# Patient Record
Sex: FEMALE | Race: BLACK OR AFRICAN AMERICAN | Employment: FULL TIME | URBAN - NONMETROPOLITAN AREA
[De-identification: names, ages, dates, MRNs, and addresses within clinical notes are randomized per-mention and may not be internally consistent; named-entity substitution may affect disease eponyms.]

---

## 2017-04-21 DIAGNOSIS — N39.41 URGE INCONTINENCE OF URINE: ICD-10-CM

## 2017-04-21 RX ORDER — OXYBUTYNIN CHLORIDE 5 MG/1
5 TABLET ORAL 2 TIMES DAILY
Qty: 60 TABLET | Refills: 5 | Status: SHIPPED | OUTPATIENT
Start: 2017-04-21 | End: 2018-11-03

## 2017-04-24 RX ORDER — UREA 40 G/100G
1 LOTION TOPICAL DAILY
Qty: 1 BOTTLE | Refills: 1 | Status: SHIPPED | OUTPATIENT
Start: 2017-04-24 | End: 2018-11-03

## 2017-06-19 ENCOUNTER — OFFICE VISIT (OUTPATIENT)
Dept: PRIMARY CARE CLINIC | Age: 57
End: 2017-06-19
Payer: MEDICAID

## 2017-06-19 VITALS
BODY MASS INDEX: 40.04 KG/M2 | OXYGEN SATURATION: 98 % | RESPIRATION RATE: 16 BRPM | HEIGHT: 59 IN | DIASTOLIC BLOOD PRESSURE: 80 MMHG | WEIGHT: 198.6 LBS | HEART RATE: 50 BPM | TEMPERATURE: 96.5 F | SYSTOLIC BLOOD PRESSURE: 116 MMHG

## 2017-06-19 DIAGNOSIS — K52.9 GASTROENTERITIS: Primary | ICD-10-CM

## 2017-06-19 PROCEDURE — 99213 OFFICE O/P EST LOW 20 MIN: CPT | Performed by: FAMILY MEDICINE

## 2017-06-19 ASSESSMENT — ENCOUNTER SYMPTOMS
NAUSEA: 1
EYES NEGATIVE: 1
ABDOMINAL DISTENTION: 0
BLOOD IN STOOL: 0
RESPIRATORY NEGATIVE: 1
VOMITING: 1
DIARRHEA: 0
ABDOMINAL PAIN: 0
ALLERGIC/IMMUNOLOGIC NEGATIVE: 1
CONSTIPATION: 0

## 2017-10-19 DIAGNOSIS — B35.1 ONYCHOMYCOSIS: ICD-10-CM

## 2018-11-03 ENCOUNTER — HOSPITAL ENCOUNTER (EMERGENCY)
Age: 58
Discharge: HOME | End: 2018-11-03

## 2018-11-03 ENCOUNTER — APPOINTMENT (OUTPATIENT)
Dept: CT IMAGING | Age: 58
End: 2018-11-03

## 2018-11-03 VITALS
OXYGEN SATURATION: 99 % | TEMPERATURE: 98.8 F | WEIGHT: 214 LBS | HEIGHT: 59 IN | HEART RATE: 75 BPM | SYSTOLIC BLOOD PRESSURE: 130 MMHG | DIASTOLIC BLOOD PRESSURE: 94 MMHG | RESPIRATION RATE: 18 BRPM | BODY MASS INDEX: 43.14 KG/M2

## 2018-11-03 DIAGNOSIS — B35.1 ONYCHOMYCOSIS: ICD-10-CM

## 2018-11-03 DIAGNOSIS — I10 ESSENTIAL HYPERTENSION: Primary | ICD-10-CM

## 2018-11-03 DIAGNOSIS — N39.41 URGE INCONTINENCE OF URINE: ICD-10-CM

## 2018-11-03 LAB
ALBUMIN SERPL-MCNC: 3.7 G/DL (ref 3.5–5.1)
ALP BLD-CCNC: 108 U/L (ref 38–126)
ALT SERPL-CCNC: 10 U/L (ref 11–66)
ANION GAP SERPL CALCULATED.3IONS-SCNC: 12 MEQ/L (ref 8–16)
AST SERPL-CCNC: 16 U/L (ref 5–40)
BASOPHILS # BLD: 0.3 %
BASOPHILS ABSOLUTE: 0 THOU/MM3 (ref 0–0.1)
BILIRUB SERPL-MCNC: 0.3 MG/DL (ref 0.3–1.2)
BILIRUBIN DIRECT: < 0.2 MG/DL (ref 0–0.3)
BUN BLDV-MCNC: 12 MG/DL (ref 7–22)
CALCIUM SERPL-MCNC: 9.4 MG/DL (ref 8.5–10.5)
CHLORIDE BLD-SCNC: 103 MEQ/L (ref 98–111)
CO2: 22 MEQ/L (ref 23–33)
CREAT SERPL-MCNC: 0.6 MG/DL (ref 0.4–1.2)
EKG ATRIAL RATE: 70 BPM
EKG P AXIS: 41 DEGREES
EKG P-R INTERVAL: 156 MS
EKG Q-T INTERVAL: 388 MS
EKG QRS DURATION: 84 MS
EKG QTC CALCULATION (BAZETT): 419 MS
EKG R AXIS: -35 DEGREES
EKG T AXIS: -12 DEGREES
EKG VENTRICULAR RATE: 70 BPM
EOSINOPHIL # BLD: 0.7 %
EOSINOPHILS ABSOLUTE: 0.1 THOU/MM3 (ref 0–0.4)
ERYTHROCYTE [DISTWIDTH] IN BLOOD BY AUTOMATED COUNT: 15.5 % (ref 11.5–14.5)
ERYTHROCYTE [DISTWIDTH] IN BLOOD BY AUTOMATED COUNT: 45.3 FL (ref 35–45)
GFR SERPL CREATININE-BSD FRML MDRD: > 90 ML/MIN/1.73M2
GLUCOSE BLD-MCNC: 89 MG/DL (ref 70–108)
HCT VFR BLD CALC: 43.9 % (ref 37–47)
HEMOGLOBIN: 14.1 GM/DL (ref 12–16)
IMMATURE GRANS (ABS): 0.02 THOU/MM3 (ref 0–0.07)
IMMATURE GRANULOCYTES: 0.3 %
LYMPHOCYTES # BLD: 27.8 %
LYMPHOCYTES ABSOLUTE: 2 THOU/MM3 (ref 1–4.8)
MCH RBC QN AUTO: 26 PG (ref 26–33)
MCHC RBC AUTO-ENTMCNC: 32.1 GM/DL (ref 32.2–35.5)
MCV RBC AUTO: 80.8 FL (ref 81–99)
MONOCYTES # BLD: 6.8 %
MONOCYTES ABSOLUTE: 0.5 THOU/MM3 (ref 0.4–1.3)
NUCLEATED RED BLOOD CELLS: 0 /100 WBC
OSMOLALITY CALCULATION: 273.1 MOSMOL/KG (ref 275–300)
PLATELET # BLD: 290 THOU/MM3 (ref 130–400)
PMV BLD AUTO: 9.3 FL (ref 9.4–12.4)
POTASSIUM SERPL-SCNC: 4.2 MEQ/L (ref 3.5–5.2)
RBC # BLD: 5.43 MILL/MM3 (ref 4.2–5.4)
SEG NEUTROPHILS: 64.1 %
SEGMENTED NEUTROPHILS ABSOLUTE COUNT: 4.6 THOU/MM3 (ref 1.8–7.7)
SODIUM BLD-SCNC: 137 MEQ/L (ref 135–145)
TOTAL PROTEIN: 7.9 G/DL (ref 6.1–8)
TROPONIN T: < 0.01 NG/ML
WBC # BLD: 7.2 THOU/MM3 (ref 4.8–10.8)

## 2018-11-03 PROCEDURE — 93005 ELECTROCARDIOGRAM TRACING: CPT | Performed by: PHYSICIAN ASSISTANT

## 2018-11-03 PROCEDURE — 85025 COMPLETE CBC W/AUTO DIFF WBC: CPT

## 2018-11-03 PROCEDURE — 80053 COMPREHEN METABOLIC PANEL: CPT

## 2018-11-03 PROCEDURE — 99284 EMERGENCY DEPT VISIT MOD MDM: CPT

## 2018-11-03 PROCEDURE — 82248 BILIRUBIN DIRECT: CPT

## 2018-11-03 PROCEDURE — 84484 ASSAY OF TROPONIN QUANT: CPT

## 2018-11-03 PROCEDURE — 36415 COLL VENOUS BLD VENIPUNCTURE: CPT

## 2018-11-03 PROCEDURE — 70450 CT HEAD/BRAIN W/O DYE: CPT

## 2018-11-03 RX ORDER — UREA 40 G/100G
1 LOTION TOPICAL DAILY
Qty: 1 BOTTLE | Refills: 1 | Status: SHIPPED | OUTPATIENT
Start: 2018-11-03 | End: 2019-11-07 | Stop reason: ALTCHOICE

## 2018-11-03 RX ORDER — OXYBUTYNIN CHLORIDE 5 MG/1
5 TABLET ORAL 2 TIMES DAILY
Qty: 60 TABLET | Refills: 1 | Status: SHIPPED | OUTPATIENT
Start: 2018-11-03 | End: 2019-10-14 | Stop reason: ALTCHOICE

## 2018-11-03 ASSESSMENT — ENCOUNTER SYMPTOMS
SHORTNESS OF BREATH: 0
DIARRHEA: 0
COLOR CHANGE: 0
EYE DISCHARGE: 0
WHEEZING: 0
COUGH: 0
ABDOMINAL PAIN: 0
EYE REDNESS: 0
SORE THROAT: 0
CONSTIPATION: 0
NAUSEA: 0
PHOTOPHOBIA: 0
RHINORRHEA: 0
BACK PAIN: 0
VOMITING: 0

## 2018-11-03 NOTE — ED PROVIDER NOTES
saturation is 99%. Physical Exam   Constitutional: She is oriented to person, place, and time. She appears well-developed and well-nourished. No distress. HENT:   Head: Normocephalic and atraumatic. Right Ear: External ear normal.   Left Ear: External ear normal.   Nose: Nose normal.   Mouth/Throat: Oropharynx is clear and moist.   Eyes: Pupils are equal, round, and reactive to light. Conjunctivae and EOM are normal. Right eye exhibits no discharge. Left eye exhibits no discharge. No scleral icterus. Neck: Normal range of motion. Neck supple. Cardiovascular: Normal rate, regular rhythm and normal heart sounds. Exam reveals no gallop and no friction rub. No murmur heard. Pulmonary/Chest: Effort normal and breath sounds normal. No respiratory distress. She has no wheezes. She has no rales. She exhibits no tenderness. Abdominal: Soft. Bowel sounds are normal. She exhibits no distension and no mass. There is no tenderness. There is no rebound and no guarding. No hernia. Musculoskeletal: Normal range of motion. She exhibits no edema. Lymphadenopathy:     She has no cervical adenopathy. Neurological: She is alert and oriented to person, place, and time. No cranial nerve deficit. She exhibits normal muscle tone. Coordination normal. GCS eye subscore is 4. GCS verbal subscore is 5. GCS motor subscore is 6. There were no noted cranial nerve or focal neurologic deficits. Cranial nerves II through XII are grossly intact. Skin: Skin is warm and dry. No rash noted. She is not diaphoretic. No erythema. No pallor. Psychiatric: She has a normal mood and affect. Her behavior is normal. Thought content normal.   Nursing note and vitals reviewed.             DIFFERENTIAL DIAGNOSIS:   Includes but not limited to: Tension headache, migraine headache, CVA, hypertensive urgency/emergency, essential hypertension, medication noncompliance    DIAGNOSTIC RESULTS     EKG: All EKG's are interpreted by the Emergency

## 2018-11-04 PROCEDURE — 93010 ELECTROCARDIOGRAM REPORT: CPT | Performed by: INTERNAL MEDICINE

## 2019-05-22 ENCOUNTER — APPOINTMENT (OUTPATIENT)
Dept: GENERAL RADIOLOGY | Age: 59
End: 2019-05-22
Payer: MEDICARE

## 2019-05-22 ENCOUNTER — HOSPITAL ENCOUNTER (EMERGENCY)
Age: 59
Discharge: HOME | End: 2019-05-22
Payer: MEDICARE

## 2019-05-22 VITALS
SYSTOLIC BLOOD PRESSURE: 144 MMHG | DIASTOLIC BLOOD PRESSURE: 88 MMHG | RESPIRATION RATE: 20 BRPM | BODY MASS INDEX: 43.22 KG/M2 | TEMPERATURE: 98.6 F | HEIGHT: 59 IN | HEART RATE: 90 BPM | OXYGEN SATURATION: 95 %

## 2019-05-22 DIAGNOSIS — J18.9 PNEUMONIA DUE TO ORGANISM: Primary | ICD-10-CM

## 2019-05-22 DIAGNOSIS — J06.9 UPPER RESPIRATORY TRACT INFECTION, UNSPECIFIED TYPE: ICD-10-CM

## 2019-05-22 LAB
GROUP A STREP CULTURE, REFLEX: NEGATIVE
REFLEX THROAT C + S: NORMAL

## 2019-05-22 PROCEDURE — 71046 X-RAY EXAM CHEST 2 VIEWS: CPT

## 2019-05-22 PROCEDURE — 87880 STREP A ASSAY W/OPTIC: CPT

## 2019-05-22 PROCEDURE — 87070 CULTURE OTHR SPECIMN AEROBIC: CPT

## 2019-05-22 PROCEDURE — 99283 EMERGENCY DEPT VISIT LOW MDM: CPT

## 2019-05-22 RX ORDER — BENZONATATE 100 MG/1
100 CAPSULE ORAL 3 TIMES DAILY PRN
Qty: 30 CAPSULE | Refills: 0 | Status: SHIPPED | OUTPATIENT
Start: 2019-05-22 | End: 2019-05-29

## 2019-05-22 RX ORDER — AMOXICILLIN AND CLAVULANATE POTASSIUM 875; 125 MG/1; MG/1
1 TABLET, FILM COATED ORAL 2 TIMES DAILY
Qty: 20 TABLET | Refills: 0 | Status: SHIPPED | OUTPATIENT
Start: 2019-05-22 | End: 2019-06-01

## 2019-05-22 RX ORDER — PREDNISONE 20 MG/1
20 TABLET ORAL 2 TIMES DAILY
Qty: 10 TABLET | Refills: 0 | Status: SHIPPED | OUTPATIENT
Start: 2019-05-22 | End: 2019-05-27

## 2019-05-22 RX ORDER — CETIRIZINE HYDROCHLORIDE 10 MG/1
10 TABLET ORAL DAILY
Qty: 30 TABLET | Refills: 0 | Status: SHIPPED | OUTPATIENT
Start: 2019-05-22 | End: 2019-06-21

## 2019-05-22 ASSESSMENT — ENCOUNTER SYMPTOMS
COUGH: 1
SINUS PRESSURE: 0
VOMITING: 0
CONSTIPATION: 0
RHINORRHEA: 1
SHORTNESS OF BREATH: 0
NAUSEA: 0
FACIAL SWELLING: 0
ABDOMINAL PAIN: 0
WHEEZING: 0
TROUBLE SWALLOWING: 0
CHEST TIGHTNESS: 0
SORE THROAT: 1
DIARRHEA: 1

## 2019-05-22 ASSESSMENT — PAIN DESCRIPTION - PAIN TYPE: TYPE: ACUTE PAIN

## 2019-05-22 ASSESSMENT — PAIN SCALES - GENERAL: PAINLEVEL_OUTOF10: 2

## 2019-05-22 ASSESSMENT — PAIN DESCRIPTION - LOCATION: LOCATION: THROAT

## 2019-05-22 ASSESSMENT — PAIN DESCRIPTION - FREQUENCY: FREQUENCY: CONTINUOUS

## 2019-05-22 ASSESSMENT — PAIN DESCRIPTION - DESCRIPTORS: DESCRIPTORS: BURNING

## 2019-05-22 NOTE — ED PROVIDER NOTES
[address]  Pt Name: Milind Zuleta  MRN: [id number]  [place] 1960  Date of evaluation: 5/22/2019  Provider: JOANNA Cook CNP    CHIEF COMPLAINT       Chief Complaint   Patient presents with    Cough    Pharyngitis       Nurses Notes reviewed and I agree except as noted in the HPI. HISTORY OF PRESENT ILLNESS    Milind Zuleta is a 62 y.o. female whopresents to the emergency department from home for evaluation of cough and pharyngitis. Patient states that she has had cough, pharyngitis, rhinorrhea, congestion, subjective fevers, and diarrhea ongoing which started today. Patient states that she has not taken anything today for her symptoms. Patient denies chills, nausea, emesis, constipation, headache, abdominal pain, or any other symptoms. All questions addressed and no further complaints or concerns at this time. Triage notes and Nursing notes were reviewed by myself. Any discrepancies are addressed above. REVIEW OF SYSTEMS     Review of Systems   Constitutional: Positive for fever. Negative for appetite change and chills. HENT: Positive for congestion, rhinorrhea and sore throat. Negative for ear pain, facial swelling, nosebleeds, postnasal drip, sinus pressure and trouble swallowing. Respiratory: Positive for cough. Negative for chest tightness, shortness of breath and wheezing. Cardiovascular: Negative for chest pain. Gastrointestinal: Positive for diarrhea. Negative for abdominal pain, constipation, nausea and vomiting. Musculoskeletal: Negative for myalgias. Skin: Negative for rash. Neurological: Negative for headaches. All pertinent systems were reviewed and are negative unless indicated in the HPI.     PAST MEDICAL HISTORY    has a past medical history of Anemia, Hypertension, Infertility female, Obesity, Pap smear, as part of routine gynecological examination, and Visit for screening mammogram.    SURGICAL HISTORY      has a past surgical history that includes Ectopic pregnancy surgery. CURRENT MEDICATIONS       Discharge Medication List as of 2019  7:37 PM      CONTINUE these medications which have NOT CHANGED    Details   ciclopirox (PENLAC) 8 % solution Apply to affected toenails topically nightly., Disp-1 Bottle, R-1, Print      naproxen-diphenhydramine (ALEVE PM) 220-25 MG TABS Take 1 tablet by mouth every 6 hours as needed (pain), Disp-30 tablet, R-1Print      oxybutynin (DITROPAN) 5 MG tablet Take 1 tablet by mouth 2 times daily, Disp-60 tablet, R-1Print      Urea 40 % LOTN Apply 1 Units topically daily, Disp-1 Bottle, R-1Print      Elastic Bandages & Supports (MEDICAL COMPRESSION STOCKINGS) MISC DAILY Starting 2016, Until Discontinued, Disp-1 each, R-0, [id number] mmHg knee high Chronic venous insufficiency  (primary encounter diagnosis)               ALLERGIES     has No Known Allergies. FAMILY HISTORY     indicated that her mother is . She indicated that her father is . She indicated that her brother is . She indicated that the status of her neg hx is unknown.   family history includes Cancer in her brother and father; Heart Disease in her mother. SOCIAL HISTORY      reports that she quit smoking about 8 years ago. Her smoking use included cigarettes. She smoked 0.50 packs per day. She has never used smokeless tobacco. She reports that she drinks alcohol. She reports that she does not use drugs. PHYSICAL EXAM     INITIAL VITALS:  height is 4' 11\" (1.499 m). Her oral temperature is 98.6 °F (37 °C). Her blood pressure is 144/88 (abnormal) and her pulse is 90. Her respiration is 20 and oxygen saturation is 95%. Physical Exam   Constitutional: She is oriented to person, place, and time. She appears well-developed and well-nourished. Non-toxic appearance. HENT:   Head: Normocephalic and atraumatic.    Right Ear: Tympanic membrane and external ear normal.   Left Ear: Tympanic membrane signed by Dr. Kellie Huntley on 5/22/2019 7:21 PM            LABS:   Labs Reviewed   THROAT CULTURE    Narrative:     Source: Specimen not received       Site:           Current Antibiotics:   GROUP A STREP, REFLEX         EMERGENCYDEPARTMENT COURSE AND MEDICAL DECISION MAKING:   Vitals:    Vitals:    05/22/19 1831   BP: (!) 144/88   Pulse: 90   Resp: 20   Temp: 98.6 °F (37 °C)   TempSrc: Oral   SpO2: 95%   Height: 4' 11\" (1.499 m)         Pertinent Labs & Imaging studies reviewed. (See chart for details)         The patient was seen and evaluated in a timely manner for cough and pharyngitis. Patient's vital signs were stable. During the physical exam I noted no acute findings. I ordered appropriate labs and chest x-ray. Patient's chest x-ray showed Minimal interstitial markings in the right lung base for which minimal infiltrate cannot be entirely excluded. Laboratory and imaging results were reviewed and discussed with the patient. Patient was discharged home with prescriptions of Augmentin, Deltasone, Tessalon Perles, and Zyrtec. Strict return precautions and follow up instructions were discussed with the patient with which the patient agrees     Physical assessment findings, diagnostic testing if applicable, and vital signs reviewed with patient. Questions answered. Patient is instructed to take Augmentin, Deltasone, Tessalon Perles, and Zyrtec as directed, including OTC medications for supportive care. Education provided on medications. Differential diagnosis discussed with patient. Home care/self care instructions reviewed with patient. Patient is to follow-up with family care provider in 2-3 days if no improvement. Patient is to go to the emergency department if symptoms worsen. Patient is aware of care plan, questions answered, verbalizes understanding and is in agreement.      ED Medications administered this visit: Medications - No data to display        I have given the patient strict written and verbal instructions about care at home,follow-up, and signs and symptoms of worsening of condition and they did verbalize understanding. Patient was seen independently by myself. The Patient's final impression and disposition and plan was determined by myself. CRITICAL CARE:   None    CONSULTS:  None    PROCEDURES:  None    FINAL IMPRESSION      1. Pneumonia due to organism    2. Upper respiratory tract infection, unspecified type          DISPOSITION/PLAN   DISPOSITION Decision To Discharge 05/22/2019 07:26:59 PM        PATIENT REFERRED TO:  Rayo Greco,   [address] [facility]  [phone]    Schedule an appointment as soon as possible for a visit in 2 days  For follow up      DISCHARGE MEDICATIONS:  Discharge Medication List as of 5/22/2019  7:37 PM      START taking these medications    Details   amoxicillin-clavulanate (AUGMENTIN) 875-125 MG per tablet Take 1 tablet by mouth 2 times daily for 10 days, Disp-20 tablet, R-0Print      predniSONE (DELTASONE) 20 MG tablet Take 1 tablet by mouth 2 times daily for 5 days, Disp-10 tablet, R-0Print      benzonatate (TESSALON PERLES) 100 MG capsule Take 1 capsule by mouth 3 times daily as needed for Cough, Disp-30 capsule, R-0Print      cetirizine (ZYRTEC) 10 MG tablet Take 1 tablet by mouth daily, Disp-30 tablet, R-0Print             (Please note that portions of this note were completed with a voice recognition program.  Efforts were made to edit the dictations but occasionally words are mis-transcribed.)    The patient was given an opportunity to see the Emergency Attending. The patient voiced understanding that I was a Mid-Level Provider and was in agreement with being seen independently by myself. Scribe:  Noreen Wheatley 5/22/19 7:21 PM Scribing for and in the presence of Fanny Grover CNP.     Provider:  I personally performed the services described in the documentation, reviewed and edited the documentation which was dictated to the scribe in my presence, and it accurately records my words and actions.     Cici Rios, NICANOR 5/22/19 5:54 AM      Burch Clarity, APRN - CNP             Burch Clarity, APRN - CNP  05/23/19 [id number]

## 2019-05-24 LAB — THROAT/NOSE CULTURE: NORMAL

## 2019-05-24 NOTE — TELEPHONE ENCOUNTER
05/24/19      [address] [facility]      Dear Maxine Acevedo                  Your colonoscopy on 5/16/19 indicated:    [x]   No polyps noted in viewed portion of colon. Unable to reach and view the cecum. Poor prep. Extremely redundant sigmoid colon. CT colonography recommended and ordered. Would not recommend another colonoscopy unless pathology identified on imaging. [x]   Diverticulosis: Outpouchings of the colon where food and bacteria can become trapped causing inflammation and infection. An over the counter fiber supplement is recommended. If you have additional questions or concerns, please call the office to review.     Sincerely,      Dr. Marc Bowers Surgeon This med has not been prescribed since 3/2016 - has she been using intermittently since then, or does she want to try another 12-week course?

## 2019-08-24 ENCOUNTER — APPOINTMENT (OUTPATIENT)
Dept: GENERAL RADIOLOGY | Age: 59
End: 2019-08-24
Payer: MEDICARE

## 2019-08-24 ENCOUNTER — HOSPITAL ENCOUNTER (EMERGENCY)
Age: 59
Discharge: HOME | End: 2019-08-24
Payer: MEDICARE

## 2019-08-24 VITALS
HEART RATE: 73 BPM | OXYGEN SATURATION: 100 % | HEIGHT: 59 IN | SYSTOLIC BLOOD PRESSURE: 143 MMHG | RESPIRATION RATE: 18 BRPM | BODY MASS INDEX: 42.94 KG/M2 | TEMPERATURE: 99.2 F | DIASTOLIC BLOOD PRESSURE: 85 MMHG | WEIGHT: 213 LBS

## 2019-08-24 DIAGNOSIS — J20.9 ACUTE BRONCHITIS, UNSPECIFIED ORGANISM: Primary | ICD-10-CM

## 2019-08-24 DIAGNOSIS — R31.29 MICROSCOPIC HEMATURIA: ICD-10-CM

## 2019-08-24 LAB
BACTERIA: ABNORMAL
BILIRUBIN URINE: NEGATIVE
BLOOD, URINE: ABNORMAL
CASTS: ABNORMAL /LPF
CASTS: ABNORMAL /LPF
CHARACTER, URINE: CLEAR
COLOR: YELLOW
CRYSTALS: ABNORMAL
EPITHELIAL CELLS, UA: ABNORMAL /HPF
GLUCOSE, URINE: NEGATIVE MG/DL
KETONES, URINE: NEGATIVE
LEUKOCYTE ESTERASE, URINE: NEGATIVE
MISCELLANEOUS LAB TEST RESULT: ABNORMAL
NITRITE, URINE: NEGATIVE
PH UA: 5.5 (ref 5–9)
PROTEIN UA: NEGATIVE MG/DL
RBC URINE: ABNORMAL /HPF
RENAL EPITHELIAL, UA: ABNORMAL
SPECIFIC GRAVITY UA: 1.02 (ref 1–1.03)
UROBILINOGEN, URINE: 0.2 EU/DL (ref 0–1)
WBC UA: ABNORMAL /HPF
YEAST: ABNORMAL

## 2019-08-24 PROCEDURE — 6370000000 HC RX 637 (ALT 250 FOR IP): Performed by: EMERGENCY MEDICINE

## 2019-08-24 PROCEDURE — 71046 X-RAY EXAM CHEST 2 VIEWS: CPT

## 2019-08-24 PROCEDURE — 99283 EMERGENCY DEPT VISIT LOW MDM: CPT

## 2019-08-24 PROCEDURE — 81001 URINALYSIS AUTO W/SCOPE: CPT

## 2019-08-24 RX ORDER — IBUPROFEN 200 MG
600 TABLET ORAL ONCE
Status: COMPLETED | OUTPATIENT
Start: 2019-08-24 | End: 2019-08-24

## 2019-08-24 RX ORDER — IBUPROFEN 600 MG/1
600 TABLET ORAL 3 TIMES DAILY PRN
Qty: 30 TABLET | Refills: 0 | Status: SHIPPED | OUTPATIENT
Start: 2019-08-24 | End: 2019-10-10 | Stop reason: ALTCHOICE

## 2019-08-24 RX ORDER — BENZONATATE 100 MG/1
100 CAPSULE ORAL 2 TIMES DAILY PRN
Qty: 20 CAPSULE | Refills: 0 | Status: SHIPPED | OUTPATIENT
Start: 2019-08-24 | End: 2019-08-31

## 2019-08-24 RX ADMIN — IBUPROFEN 600 MG: 200 TABLET, FILM COATED ORAL at 11:13

## 2019-08-24 ASSESSMENT — PAIN DESCRIPTION - LOCATION: LOCATION: GENERALIZED

## 2019-08-24 ASSESSMENT — PAIN DESCRIPTION - FREQUENCY: FREQUENCY: CONTINUOUS

## 2019-08-24 ASSESSMENT — PAIN DESCRIPTION - ONSET: ONSET: ON-GOING

## 2019-08-24 ASSESSMENT — PAIN DESCRIPTION - PAIN TYPE: TYPE: ACUTE PAIN

## 2019-08-24 ASSESSMENT — ENCOUNTER SYMPTOMS
SHORTNESS OF BREATH: 0
COUGH: 1
DIARRHEA: 0
ABDOMINAL PAIN: 0
BACK PAIN: 1
NAUSEA: 0
VOMITING: 0
WHEEZING: 0
RHINORRHEA: 0
SORE THROAT: 1
EYE DISCHARGE: 0
EYE PAIN: 0

## 2019-08-24 ASSESSMENT — PAIN DESCRIPTION - PROGRESSION: CLINICAL_PROGRESSION: GRADUALLY WORSENING

## 2019-08-24 ASSESSMENT — PAIN SCALES - GENERAL: PAINLEVEL_OUTOF10: 5

## 2019-08-24 ASSESSMENT — PAIN DESCRIPTION - DESCRIPTORS: DESCRIPTORS: ACHING;DISCOMFORT

## 2019-08-24 NOTE — ED TRIAGE NOTES
Arrives to  ER for the evaluation of  Productive cough, generalized body aches and lower back pain. Reports sputum production that is thick and yellow. Also has sore throat, bilateral lower back pain and postnasal drip. Denies fever, dysuria, SOB, chest pain. Denies taking any OTC acetaminophen or ibuprofen due to \"I can not afford it\". Is on menstrual cycle at this time. Sitting side of cot at this time. Call light in reach. Will monitor.

## 2019-08-24 NOTE — ED PROVIDER NOTES
membrane normal.   Mouth/Throat: Oropharynx is clear and moist and mucous membranes are normal. No posterior oropharyngeal edema or posterior oropharyngeal erythema. Eyes: Conjunctivae and EOM are normal.   Neck: Normal range of motion. Neck supple. No JVD present. Cardiovascular: Normal rate, regular rhythm, normal heart sounds, intact distal pulses and normal pulses. Exam reveals no gallop and no friction rub. No murmur heard. Pulmonary/Chest: Effort normal and breath sounds normal. No respiratory distress. She has no decreased breath sounds. She has no wheezes. She has no rhonchi. She has no rales. Abdominal: There is no CVA tenderness. Musculoskeletal: Normal range of motion. She exhibits no edema. Neurological: She is alert and oriented to person, place, and time. She exhibits normal muscle tone. Coordination normal.   Skin: Skin is warm and dry. No rash noted. She is not diaphoretic. Nursing note and vitals reviewed. DIFFERENTIAL DIAGNOSIS:   Pneumonia, URI, bronchitis     DIAGNOSTIC RESULTS     EKG: All EKG's are interpreted by the Emergency Department Physician who either signs or Co-signs this chart in theabsence of a cardiologist.    None    RADIOLOGY:non-plain film images(s) such as CT, Ultrasound and MRI are read by the radiologist.    XR CHEST STANDARD (2 VW)   Final Result   Normal chest series. **This report has been created using voice recognition software. It may contain minor errors which are inherent in voice recognition technology. **      Final report electronically signed by Dr. Radha Mg MD on 8/24/2019 10:33 AM          LABS:     Labs Reviewed   URINALYSIS WITH MICROSCOPIC - Abnormal; Notable for the following components:       Result Value    Blood, Urine LARGE (*)     All other components within normal limits       EMERGENCY DEPARTMENT COURSE:   Vitals:    Vitals:    08/24/19 0958 08/24/19 1115   BP: (!) 159/96 (!) 143/85   Pulse: 74 73   Resp: 18 18   Temp: 99.2 °F (37.3 °C)    TempSrc: Oral    SpO2: 97% 100%   Weight: 213 lb (96.6 kg)    Height: 4' 11\" (1.499 m)        9:53 AM: The patient was seen and evaluated. MDM:  The patient was seen and evaluated within the ED today following a cough. Within the department, I observed the patient's vital signs to be within acceptable range. Patient was afebrile and in no acute distress. I appreciated a negative exam. Radiological studies revealed no acute findings. Urine had microscopic blood present. I notified the patient and told her to follow up with her PCP. Within the department, the patient was treated with Motrin. I observed the patient's condition to improve during the duration of their stay. I explained my proposed course of treatment to the patient, and they were amenable to my decision. They were discharged home with Tessalon and Motrin, and will return to the ED if their symptoms become more severe in nature, or otherwise change. The patient is to follow up with [address] [place] [address]. Call [address] [facility] for follow up appoint    CRITICAL CARE:   None     CONSULTS:  None    PROCEDURES:  None    FINAL IMPRESSION      1. Acute bronchitis, unspecified organism    2. Microscopic hematuria          DISPOSITION/PLAN   Discharge     PATIENT REFERRED TO:  [facility] [address],[address]  [facility]. [address] [facility] [address]  Call in 2 days        DISCHARGE MEDICATIONS:  Discharge Medication List as of 8/24/2019 11:14 AM      START taking these medications    Details   benzonatate (TESSALON) 100 MG capsule Take 1 capsule by mouth 2 times daily as needed for Cough, Disp-20 capsule, R-0Print      ibuprofen (ADVIL;MOTRIN) 600 MG tablet Take 1 tablet by mouth 3 times daily as needed for Pain, Disp-30 tablet, R-0Print             (Please note that portions of this note were completed with a voice recognition program.

## 2019-09-02 ENCOUNTER — APPOINTMENT (OUTPATIENT)
Dept: GENERAL RADIOLOGY | Age: 59
End: 2019-09-02
Payer: MEDICARE

## 2019-09-02 ENCOUNTER — HOSPITAL ENCOUNTER (EMERGENCY)
Age: 59
Discharge: HOME | End: 2019-09-02
Payer: MEDICARE

## 2019-09-02 VITALS
RESPIRATION RATE: 18 BRPM | SYSTOLIC BLOOD PRESSURE: 139 MMHG | DIASTOLIC BLOOD PRESSURE: 92 MMHG | WEIGHT: 213 LBS | TEMPERATURE: 99 F | HEIGHT: 59 IN | HEART RATE: 94 BPM | BODY MASS INDEX: 42.94 KG/M2 | OXYGEN SATURATION: 100 %

## 2019-09-02 DIAGNOSIS — J06.9 VIRAL URI WITH COUGH: Primary | ICD-10-CM

## 2019-09-02 PROCEDURE — 94640 AIRWAY INHALATION TREATMENT: CPT

## 2019-09-02 PROCEDURE — 6370000000 HC RX 637 (ALT 250 FOR IP): Performed by: NURSE PRACTITIONER

## 2019-09-02 PROCEDURE — 71046 X-RAY EXAM CHEST 2 VIEWS: CPT

## 2019-09-02 PROCEDURE — 94761 N-INVAS EAR/PLS OXIMETRY MLT: CPT

## 2019-09-02 PROCEDURE — 99283 EMERGENCY DEPT VISIT LOW MDM: CPT

## 2019-09-02 PROCEDURE — 96372 THER/PROPH/DIAG INJ SC/IM: CPT

## 2019-09-02 RX ORDER — IPRATROPIUM BROMIDE AND ALBUTEROL SULFATE 2.5; .5 MG/3ML; MG/3ML
2 SOLUTION RESPIRATORY (INHALATION) ONCE
Status: COMPLETED | OUTPATIENT
Start: 2019-09-02 | End: 2019-09-02

## 2019-09-02 RX ORDER — METHYLPREDNISOLONE SODIUM SUCCINATE 125 MG/2ML
125 INJECTION, POWDER, LYOPHILIZED, FOR SOLUTION INTRAMUSCULAR; INTRAVENOUS ONCE
Status: DISCONTINUED | OUTPATIENT
Start: 2019-09-02 | End: 2019-09-02

## 2019-09-02 RX ORDER — PREDNISONE 50 MG/1
50 TABLET ORAL DAILY
Qty: 5 TABLET | Refills: 0 | Status: SHIPPED | OUTPATIENT
Start: 2019-09-02 | End: 2019-09-07

## 2019-09-02 RX ORDER — GUAIFENESIN AND DEXTROMETHORPHAN HYDROBROMIDE 600; 30 MG/1; MG/1
1 TABLET, EXTENDED RELEASE ORAL 2 TIMES DAILY
Qty: 28 TABLET | Refills: 0 | Status: SHIPPED | OUTPATIENT
Start: 2019-09-02 | End: 2019-10-24 | Stop reason: SDUPTHER

## 2019-09-02 RX ADMIN — PREDNISONE 50 MG: 10 TABLET ORAL at 04:14

## 2019-09-02 RX ADMIN — IPRATROPIUM BROMIDE AND ALBUTEROL SULFATE 2 AMPULE: .5; 3 SOLUTION RESPIRATORY (INHALATION) at 04:40

## 2019-09-02 ASSESSMENT — ENCOUNTER SYMPTOMS
EYE DISCHARGE: 0
WHEEZING: 1
SORE THROAT: 0
DIARRHEA: 0
NAUSEA: 0
SHORTNESS OF BREATH: 0
BACK PAIN: 0
VOMITING: 0
RHINORRHEA: 0
EYE PAIN: 0
COUGH: 1
ABDOMINAL PAIN: 0

## 2019-09-04 ENCOUNTER — APPOINTMENT (OUTPATIENT)
Dept: GENERAL RADIOLOGY | Age: 59
End: 2019-09-04
Payer: MEDICARE

## 2019-09-04 ENCOUNTER — HOSPITAL ENCOUNTER (EMERGENCY)
Age: 59
Discharge: HOME | End: 2019-09-05
Attending: EMERGENCY MEDICINE
Payer: MEDICARE

## 2019-09-04 DIAGNOSIS — J40 BRONCHITIS: Primary | ICD-10-CM

## 2019-09-04 LAB
ALBUMIN SERPL-MCNC: 3.9 G/DL (ref 3.5–5.1)
ALP BLD-CCNC: 101 U/L (ref 38–126)
ALT SERPL-CCNC: 14 U/L (ref 11–66)
ANION GAP SERPL CALCULATED.3IONS-SCNC: 14 MEQ/L (ref 8–16)
AST SERPL-CCNC: 16 U/L (ref 5–40)
BASOPHILS # BLD: 0.7 %
BASOPHILS ABSOLUTE: 0.1 THOU/MM3 (ref 0–0.1)
BILIRUB SERPL-MCNC: 0.2 MG/DL (ref 0.3–1.2)
BUN BLDV-MCNC: 18 MG/DL (ref 7–22)
CALCIUM SERPL-MCNC: 9.3 MG/DL (ref 8.5–10.5)
CHLORIDE BLD-SCNC: 105 MEQ/L (ref 98–111)
CO2: 25 MEQ/L (ref 23–33)
CREAT SERPL-MCNC: 0.8 MG/DL (ref 0.4–1.2)
EKG ATRIAL RATE: 82 BPM
EKG P AXIS: 23 DEGREES
EKG P-R INTERVAL: 128 MS
EKG Q-T INTERVAL: 364 MS
EKG QRS DURATION: 82 MS
EKG QTC CALCULATION (BAZETT): 425 MS
EKG R AXIS: -37 DEGREES
EKG T AXIS: 13 DEGREES
EKG VENTRICULAR RATE: 82 BPM
EOSINOPHIL # BLD: 0.9 %
EOSINOPHILS ABSOLUTE: 0.1 THOU/MM3 (ref 0–0.4)
ERYTHROCYTE [DISTWIDTH] IN BLOOD BY AUTOMATED COUNT: 15.5 % (ref 11.5–14.5)
ERYTHROCYTE [DISTWIDTH] IN BLOOD BY AUTOMATED COUNT: 48.3 FL (ref 35–45)
FLU A ANTIGEN: NEGATIVE
FLU B ANTIGEN: NEGATIVE
GFR SERPL CREATININE-BSD FRML MDRD: 89 ML/MIN/1.73M2
GLUCOSE BLD-MCNC: 106 MG/DL (ref 70–108)
HCT VFR BLD CALC: 45.4 % (ref 37–47)
HEMOGLOBIN: 13.5 GM/DL (ref 12–16)
IMMATURE GRANS (ABS): 0.04 THOU/MM3 (ref 0–0.07)
IMMATURE GRANULOCYTES: 0 %
LIPASE: 15 U/L (ref 5.6–51.3)
LYMPHOCYTES # BLD: 31.9 %
LYMPHOCYTES ABSOLUTE: 2.9 THOU/MM3 (ref 1–4.8)
MAGNESIUM: 2.1 MG/DL (ref 1.6–2.4)
MCH RBC QN AUTO: 25.5 PG (ref 26–33)
MCHC RBC AUTO-ENTMCNC: 29.7 GM/DL (ref 32.2–35.5)
MCV RBC AUTO: 85.8 FL (ref 81–99)
MONOCYTES # BLD: 8.2 %
MONOCYTES ABSOLUTE: 0.7 THOU/MM3 (ref 0.4–1.3)
NUCLEATED RED BLOOD CELLS: 0 /100 WBC
OSMOLALITY CALCULATION: 289.2 MOSMOL/KG (ref 275–300)
PLATELET # BLD: 278 THOU/MM3 (ref 130–400)
PMV BLD AUTO: 10 FL (ref 9.4–12.4)
POTASSIUM SERPL-SCNC: 3.5 MEQ/L (ref 3.5–5.2)
PRO-BNP: 112.6 PG/ML (ref 0–900)
RBC # BLD: 5.29 MILL/MM3 (ref 4.2–5.4)
SEG NEUTROPHILS: 57.9 %
SEGMENTED NEUTROPHILS ABSOLUTE COUNT: 5.3 THOU/MM3 (ref 1.8–7.7)
SODIUM BLD-SCNC: 144 MEQ/L (ref 135–145)
TOTAL PROTEIN: 7.4 G/DL (ref 6.1–8)
TROPONIN T: < 0.01 NG/ML
WBC # BLD: 9.1 THOU/MM3 (ref 4.8–10.8)

## 2019-09-04 PROCEDURE — 83690 ASSAY OF LIPASE: CPT

## 2019-09-04 PROCEDURE — 85025 COMPLETE CBC W/AUTO DIFF WBC: CPT

## 2019-09-04 PROCEDURE — 87804 INFLUENZA ASSAY W/OPTIC: CPT

## 2019-09-04 PROCEDURE — 71046 X-RAY EXAM CHEST 2 VIEWS: CPT

## 2019-09-04 PROCEDURE — 80053 COMPREHEN METABOLIC PANEL: CPT

## 2019-09-04 PROCEDURE — 36415 COLL VENOUS BLD VENIPUNCTURE: CPT

## 2019-09-04 PROCEDURE — 84484 ASSAY OF TROPONIN QUANT: CPT

## 2019-09-04 PROCEDURE — 83880 ASSAY OF NATRIURETIC PEPTIDE: CPT

## 2019-09-04 PROCEDURE — 93005 ELECTROCARDIOGRAM TRACING: CPT | Performed by: EMERGENCY MEDICINE

## 2019-09-04 PROCEDURE — 83735 ASSAY OF MAGNESIUM: CPT

## 2019-09-04 PROCEDURE — 99285 EMERGENCY DEPT VISIT HI MDM: CPT

## 2019-09-04 RX ORDER — KETOROLAC TROMETHAMINE 30 MG/ML
30 INJECTION, SOLUTION INTRAMUSCULAR; INTRAVENOUS ONCE
Status: DISCONTINUED | OUTPATIENT
Start: 2019-09-05 | End: 2019-09-05

## 2019-09-04 RX ORDER — METHYLPREDNISOLONE SODIUM SUCCINATE 125 MG/2ML
80 INJECTION, POWDER, LYOPHILIZED, FOR SOLUTION INTRAMUSCULAR; INTRAVENOUS ONCE
Status: DISCONTINUED | OUTPATIENT
Start: 2019-09-05 | End: 2019-09-05

## 2019-09-04 RX ORDER — ALBUTEROL SULFATE 90 UG/1
2 AEROSOL, METERED RESPIRATORY (INHALATION) EVERY 6 HOURS PRN
Status: DISCONTINUED | OUTPATIENT
Start: 2019-09-04 | End: 2019-09-05 | Stop reason: HOSPADM

## 2019-09-04 RX ORDER — IPRATROPIUM BROMIDE AND ALBUTEROL SULFATE 2.5; .5 MG/3ML; MG/3ML
1 SOLUTION RESPIRATORY (INHALATION) ONCE
Status: COMPLETED | OUTPATIENT
Start: 2019-09-05 | End: 2019-09-05

## 2019-09-04 ASSESSMENT — PAIN DESCRIPTION - LOCATION: LOCATION: BACK

## 2019-09-04 ASSESSMENT — PAIN SCALES - GENERAL: PAINLEVEL_OUTOF10: 6

## 2019-09-04 ASSESSMENT — PAIN DESCRIPTION - FREQUENCY: FREQUENCY: CONTINUOUS

## 2019-09-04 ASSESSMENT — PAIN DESCRIPTION - PAIN TYPE: TYPE: ACUTE PAIN

## 2019-09-04 ASSESSMENT — PAIN DESCRIPTION - DESCRIPTORS: DESCRIPTORS: ACHING

## 2019-09-05 VITALS
RESPIRATION RATE: 16 BRPM | TEMPERATURE: 98.1 F | DIASTOLIC BLOOD PRESSURE: 83 MMHG | WEIGHT: 213 LBS | HEART RATE: 66 BPM | BODY MASS INDEX: 42.94 KG/M2 | OXYGEN SATURATION: 100 % | HEIGHT: 59 IN | SYSTOLIC BLOOD PRESSURE: 127 MMHG

## 2019-09-05 PROCEDURE — 93010 ELECTROCARDIOGRAM REPORT: CPT | Performed by: INTERNAL MEDICINE

## 2019-09-05 PROCEDURE — 6370000000 HC RX 637 (ALT 250 FOR IP): Performed by: EMERGENCY MEDICINE

## 2019-09-05 PROCEDURE — 94640 AIRWAY INHALATION TREATMENT: CPT

## 2019-09-05 RX ORDER — AZITHROMYCIN 250 MG/1
250 TABLET, FILM COATED ORAL SEE ADMIN INSTRUCTIONS
Qty: 6 TABLET | Refills: 0 | Status: SHIPPED | OUTPATIENT
Start: 2019-09-05 | End: 2019-09-10

## 2019-09-05 RX ORDER — PREDNISONE 20 MG/1
40 TABLET ORAL ONCE
Status: COMPLETED | OUTPATIENT
Start: 2019-09-05 | End: 2019-09-05

## 2019-09-05 RX ORDER — PREDNISONE 10 MG/1
20 TABLET ORAL DAILY
Qty: 20 TABLET | Refills: 0 | Status: SHIPPED | OUTPATIENT
Start: 2019-09-05 | End: 2019-09-15

## 2019-09-05 RX ORDER — IBUPROFEN 400 MG/1
400 TABLET ORAL EVERY 6 HOURS PRN
Qty: 40 TABLET | Refills: 0 | Status: SHIPPED | OUTPATIENT
Start: 2019-09-05 | End: 2019-11-07 | Stop reason: ALTCHOICE

## 2019-09-05 RX ORDER — ENALAPRILAT 2.5 MG/2ML
1.25 INJECTION INTRAVENOUS ONCE
Status: DISCONTINUED | OUTPATIENT
Start: 2019-09-05 | End: 2019-09-05 | Stop reason: HOSPADM

## 2019-09-05 RX ORDER — IBUPROFEN 200 MG
400 TABLET ORAL ONCE
Status: COMPLETED | OUTPATIENT
Start: 2019-09-05 | End: 2019-09-05

## 2019-09-05 RX ADMIN — ALBUTEROL SULFATE 2 PUFF: 90 AEROSOL, METERED RESPIRATORY (INHALATION) at 01:02

## 2019-09-05 RX ADMIN — IBUPROFEN 400 MG: 200 TABLET, FILM COATED ORAL at 01:01

## 2019-09-05 RX ADMIN — PREDNISONE 40 MG: 20 TABLET ORAL at 01:01

## 2019-09-05 RX ADMIN — IPRATROPIUM BROMIDE AND ALBUTEROL SULFATE 1 AMPULE: .5; 3 SOLUTION RESPIRATORY (INHALATION) at 00:43

## 2019-09-05 ASSESSMENT — ENCOUNTER SYMPTOMS
SINUS PRESSURE: 0
BLOOD IN STOOL: 0
BACK PAIN: 1
VOMITING: 0
CHOKING: 0
EYE REDNESS: 0
EYE DISCHARGE: 0
NAUSEA: 0
EYE PAIN: 0
SHORTNESS OF BREATH: 0
ABDOMINAL DISTENTION: 0
RHINORRHEA: 0
PHOTOPHOBIA: 0
TROUBLE SWALLOWING: 0
ABDOMINAL PAIN: 0
DIARRHEA: 0
CHEST TIGHTNESS: 0
WHEEZING: 1
CONSTIPATION: 0
VOICE CHANGE: 0
SORE THROAT: 0
EYE ITCHING: 0
COUGH: 1

## 2019-09-05 ASSESSMENT — PAIN SCALES - GENERAL: PAINLEVEL_OUTOF10: 2

## 2019-09-05 NOTE — ED PROVIDER NOTES
[facility]  eMERGENCY dEPARTMENT eNCOUnter          [facility]       Chief Complaint   Patient presents with    Shortness of Breath    Back Pain       Nurses Notes reviewed and I agree except as noted in the HPI. HISTORY OF PRESENT ILLNESS    Luna Medrano is a 61 y.o. female who presents to the Emergency Department for the evaluation of shortness of breath and back pain. Patient was seen in the ED two days ago for similar symptoms and was diagnosed with a URI and was treated for such. Patient complains of wheezing and a cough, but denies any sinus issues or congestion. Patient states that she does not have an albuterol inhaler at home. Patient also complains of back pain, and describes it as possibly being rib pain due to coughing. The patient denies any other symptoms or relevant history at this time. The HPI was provided by the patient. REVIEW OF SYSTEMS      Review of Systems   Constitutional: Negative for activity change, appetite change, diaphoresis, fatigue and unexpected weight change. HENT: Negative for congestion, ear discharge, ear pain, hearing loss, rhinorrhea, sinus pressure, sore throat, trouble swallowing and voice change. Eyes: Negative for photophobia, pain, discharge, redness and itching. Respiratory: Positive for cough and wheezing. Negative for choking, chest tightness and shortness of breath. Cardiovascular: Negative for chest pain, palpitations and leg swelling. Gastrointestinal: Negative for abdominal distention, abdominal pain, blood in stool, constipation, diarrhea, nausea and vomiting. Endocrine: Negative for polydipsia, polyphagia and polyuria. Genitourinary: Negative for decreased urine volume, difficulty urinating, dysuria, enuresis, frequency, hematuria and urgency. Musculoskeletal: Positive for back pain (rib pain). Negative for arthralgias, gait problem, myalgias, neck pain and neck stiffness.    Skin: Negative for pallor and She indicated that her brother is . She indicated that the status of her neg hx is unknown.   family history includes Cancer in her brother and father; Heart Disease in her mother. SOCIAL HISTORY    reports that she has been smoking cigars. She has been smoking about 0.00 packs per day. She has never used smokeless tobacco. She reports that she drinks about 2.0 standard drinks of alcohol per week. She reports that she does not use drugs. PHYSICAL EXAM     INITIAL VITALS:  height is 4' 11\" (1.499 m) and weight is 213 lb (96.6 kg). Her oral temperature is 98.1 °F (36.7 °C). Her blood pressure is 127/83 and her pulse is 66. Her respiration is 16 and oxygen saturation is 100%. Physical Exam   Constitutional: She is oriented to person, place, and time. She appears well-developed and well-nourished. Non-toxic appearance. No distress. HENT:   Head: Normocephalic and atraumatic. Right Ear: Tympanic membrane and external ear normal.   Left Ear: Tympanic membrane and external ear normal.   Nose: Nose normal.   Mouth/Throat: Oropharynx is clear and moist and mucous membranes are normal. No oropharyngeal exudate, posterior oropharyngeal edema or posterior oropharyngeal erythema. Eyes: Pupils are equal, round, and reactive to light. Conjunctivae and EOM are normal. Right eye exhibits no discharge. Left eye exhibits no discharge. No scleral icterus. Neck: Normal range of motion. Neck supple. No JVD present. No tracheal deviation present. No thyromegaly present. Cardiovascular: Normal rate, regular rhythm, S1 normal, S2 normal, normal heart sounds, intact distal pulses and normal pulses. Exam reveals no gallop and no friction rub. No murmur heard. Pulmonary/Chest: Effort normal and breath sounds normal. No stridor. No respiratory distress. She has no decreased breath sounds. She has no wheezes. She has no rhonchi. She has no rales. She exhibits no tenderness.    No wheezing noted on exam.   Abdominal: Soft. Bowel sounds are normal. She exhibits no distension and no mass. There is no tenderness. There is no rebound, no guarding and no CVA tenderness. No hernia. Musculoskeletal: Normal range of motion. She exhibits no edema or tenderness. Lymphadenopathy:     She has no cervical adenopathy. Neurological: She is alert and oriented to person, place, and time. She has normal reflexes. No cranial nerve deficit. She exhibits normal muscle tone. Coordination normal.   Skin: Skin is warm and dry. No bruising, no ecchymosis, no lesion and no rash noted. She is not diaphoretic. Psychiatric: She has a normal mood and affect. Her speech is normal and behavior is normal. Judgment and thought content normal. Cognition and memory are normal.   Nursing note and vitals reviewed. DIFFERENTIAL DIAGNOSIS:   Differential diagnoses were discussed extensively with the patient and family including but not limited to bronchitis, URI, pneumonia, asthma       DIAGNOSTIC RESULTS     EKG: All EKG's are interpreted by the Emergency Department Physician who either signs or Co-signs this chart in the absence of a cardiologist.  EKG interpreted by Rosalee Conrad DO:    EKG read and interpreted by myself and gives impression of normal sinus rhythm and no STEMI with heart rate of 82; interval 128; QRS 82;QTc 425; axis 23,-37,13. RADIOLOGY: non-plain film images(s) such as CT, Ultrasound and MRI are read by the radiologist.    XR CHEST STANDARD (2 VW)   Final Result      No acute cardiopulmonary disease. **This report has been created using voice recognition software. It may contain minor errors which are inherent in voice recognition technology. **      Final report electronically signed by Dr. Yasmeen Radford on 9/4/2019 10:30 PM          LABS:   Labs Reviewed   CBC WITH AUTO DIFFERENTIAL - Abnormal; Notable for the following components:       Result Value    MCH 25.5 (*)     MCHC 29.7 (*)     RDW-CV 15.5 (*)     RDW-SD None    CONSULTS:  None    PROCEDURES:  None    FINAL IMPRESSION      1. Bronchitis          DISPOSITION/PLAN   Discharge    PATIENT REFERRED TO:  No follow-up provider specified. DISCHARGE MEDICATIONS:  Discharge Medication List as of 9/5/2019  1:12 AM      START taking these medications    Details   !! ibuprofen (IBU) 400 MG tablet Take 1 tablet by mouth every 6 hours as needed for Pain, Disp-40 tablet, R-0Print      !! predniSONE (DELTASONE) 10 MG tablet Take 2 tablets by mouth daily for 10 days, Disp-20 tablet, R-0Print      azithromycin (ZITHROMAX) 250 MG tablet Take 1 tablet by mouth See Admin Instructions for 5 days 500mg on day 1 followed by 250mg on days 2 - 5, Disp-6 tablet, R-0Print       !! - Potential duplicate medications found. Please discuss with provider. (Please note that portions of this note were completed with a voice recognition program.  Efforts weremade to edit the dictations but occasionally words are mis-transcribed.)    Scribe:  iMreille Aguilar 9/4/19 11:57 PM Scribing for and in the presence ofFlaco Reddy DO. Scribe: Mireille Aguilar 9/4/19 11:57 PM    Provider:  I personally performed the services described in the documentation, reviewed and edited the documentation which was dictated to the scribe inmy presence, and it accurately records my words and actions.     Rufus Pappas DO 9/4/19 7:42 AM       Rufus Pappas DO  09/05/19 [id number]

## 2019-10-10 ENCOUNTER — HOSPITAL ENCOUNTER (EMERGENCY)
Age: 59
Discharge: HOME | End: 2019-10-10
Attending: FAMILY MEDICINE
Payer: MEDICARE

## 2019-10-10 ENCOUNTER — APPOINTMENT (OUTPATIENT)
Dept: GENERAL RADIOLOGY | Age: 59
End: 2019-10-10
Payer: MEDICARE

## 2019-10-10 VITALS
WEIGHT: 220 LBS | OXYGEN SATURATION: 99 % | HEART RATE: 67 BPM | DIASTOLIC BLOOD PRESSURE: 89 MMHG | RESPIRATION RATE: 16 BRPM | BODY MASS INDEX: 44.35 KG/M2 | SYSTOLIC BLOOD PRESSURE: 142 MMHG | HEIGHT: 59 IN | TEMPERATURE: 97.7 F

## 2019-10-10 DIAGNOSIS — J20.9 ACUTE BRONCHITIS, UNSPECIFIED ORGANISM: Primary | ICD-10-CM

## 2019-10-10 LAB
ALBUMIN SERPL-MCNC: 3.5 G/DL (ref 3.5–5.1)
ALP BLD-CCNC: 102 U/L (ref 38–126)
ALT SERPL-CCNC: 10 U/L (ref 11–66)
ANION GAP SERPL CALCULATED.3IONS-SCNC: 10 MEQ/L (ref 8–16)
AST SERPL-CCNC: 14 U/L (ref 5–40)
BASOPHILS # BLD: 0.5 %
BASOPHILS ABSOLUTE: 0 THOU/MM3 (ref 0–0.1)
BILIRUB SERPL-MCNC: 0.3 MG/DL (ref 0.3–1.2)
BILIRUBIN DIRECT: < 0.2 MG/DL (ref 0–0.3)
BUN BLDV-MCNC: 11 MG/DL (ref 7–22)
CALCIUM SERPL-MCNC: 9 MG/DL (ref 8.5–10.5)
CHLORIDE BLD-SCNC: 107 MEQ/L (ref 98–111)
CO2: 23 MEQ/L (ref 23–33)
CREAT SERPL-MCNC: 0.6 MG/DL (ref 0.4–1.2)
EKG ATRIAL RATE: 71 BPM
EKG P AXIS: 33 DEGREES
EKG P-R INTERVAL: 144 MS
EKG Q-T INTERVAL: 394 MS
EKG QRS DURATION: 78 MS
EKG QTC CALCULATION (BAZETT): 428 MS
EKG R AXIS: -27 DEGREES
EKG T AXIS: -20 DEGREES
EKG VENTRICULAR RATE: 71 BPM
EOSINOPHIL # BLD: 1.4 %
EOSINOPHILS ABSOLUTE: 0.1 THOU/MM3 (ref 0–0.4)
ERYTHROCYTE [DISTWIDTH] IN BLOOD BY AUTOMATED COUNT: 15.7 % (ref 11.5–14.5)
ERYTHROCYTE [DISTWIDTH] IN BLOOD BY AUTOMATED COUNT: 49 FL (ref 35–45)
FLU A ANTIGEN: NEGATIVE
FLU B ANTIGEN: NEGATIVE
GFR SERPL CREATININE-BSD FRML MDRD: > 90 ML/MIN/1.73M2
GLUCOSE BLD-MCNC: 100 MG/DL (ref 70–108)
HCT VFR BLD CALC: 43.8 % (ref 37–47)
HEMOGLOBIN: 13.2 GM/DL (ref 12–16)
IMMATURE GRANS (ABS): 0.02 THOU/MM3 (ref 0–0.07)
IMMATURE GRANULOCYTES: 0.5 %
LYMPHOCYTES # BLD: 27.8 %
LYMPHOCYTES ABSOLUTE: 1 THOU/MM3 (ref 1–4.8)
MCH RBC QN AUTO: 25.8 PG (ref 26–33)
MCHC RBC AUTO-ENTMCNC: 30.1 GM/DL (ref 32.2–35.5)
MCV RBC AUTO: 85.7 FL (ref 81–99)
MONOCYTES # BLD: 13 %
MONOCYTES ABSOLUTE: 0.5 THOU/MM3 (ref 0.4–1.3)
NUCLEATED RED BLOOD CELLS: 0 /100 WBC
OSMOLALITY CALCULATION: 278.9 MOSMOL/KG (ref 275–300)
PLATELET # BLD: 208 THOU/MM3 (ref 130–400)
PMV BLD AUTO: 9.7 FL (ref 9.4–12.4)
POTASSIUM SERPL-SCNC: 3.7 MEQ/L (ref 3.5–5.2)
PRO-BNP: 39.5 PG/ML (ref 0–900)
PROCALCITONIN: 0.05 NG/ML (ref 0.01–0.09)
RBC # BLD: 5.11 MILL/MM3 (ref 4.2–5.4)
SEG NEUTROPHILS: 56.8 %
SEGMENTED NEUTROPHILS ABSOLUTE COUNT: 2.1 THOU/MM3 (ref 1.8–7.7)
SODIUM BLD-SCNC: 140 MEQ/L (ref 135–145)
TOTAL PROTEIN: 6.8 G/DL (ref 6.1–8)
TROPONIN T: < 0.01 NG/ML
WBC # BLD: 3.7 THOU/MM3 (ref 4.8–10.8)

## 2019-10-10 PROCEDURE — 87804 INFLUENZA ASSAY W/OPTIC: CPT

## 2019-10-10 PROCEDURE — 84484 ASSAY OF TROPONIN QUANT: CPT

## 2019-10-10 PROCEDURE — 93010 ELECTROCARDIOGRAM REPORT: CPT | Performed by: NUCLEAR MEDICINE

## 2019-10-10 PROCEDURE — 6370000000 HC RX 637 (ALT 250 FOR IP): Performed by: FAMILY MEDICINE

## 2019-10-10 PROCEDURE — 94640 AIRWAY INHALATION TREATMENT: CPT

## 2019-10-10 PROCEDURE — 94761 N-INVAS EAR/PLS OXIMETRY MLT: CPT

## 2019-10-10 PROCEDURE — 82248 BILIRUBIN DIRECT: CPT

## 2019-10-10 PROCEDURE — 80053 COMPREHEN METABOLIC PANEL: CPT

## 2019-10-10 PROCEDURE — 99285 EMERGENCY DEPT VISIT HI MDM: CPT

## 2019-10-10 PROCEDURE — 83880 ASSAY OF NATRIURETIC PEPTIDE: CPT

## 2019-10-10 PROCEDURE — 93005 ELECTROCARDIOGRAM TRACING: CPT | Performed by: FAMILY MEDICINE

## 2019-10-10 PROCEDURE — 36415 COLL VENOUS BLD VENIPUNCTURE: CPT

## 2019-10-10 PROCEDURE — 84145 PROCALCITONIN (PCT): CPT

## 2019-10-10 PROCEDURE — 85025 COMPLETE CBC W/AUTO DIFF WBC: CPT

## 2019-10-10 PROCEDURE — 71046 X-RAY EXAM CHEST 2 VIEWS: CPT

## 2019-10-10 RX ORDER — IPRATROPIUM BROMIDE AND ALBUTEROL SULFATE 2.5; .5 MG/3ML; MG/3ML
1 SOLUTION RESPIRATORY (INHALATION) ONCE
Status: COMPLETED | OUTPATIENT
Start: 2019-10-10 | End: 2019-10-10

## 2019-10-10 RX ORDER — PREDNISONE 20 MG/1
20 TABLET ORAL 2 TIMES DAILY
Qty: 10 TABLET | Refills: 0 | Status: SHIPPED | OUTPATIENT
Start: 2019-10-10 | End: 2019-10-15

## 2019-10-10 RX ORDER — METHYLPREDNISOLONE SODIUM SUCCINATE 40 MG/ML
40 INJECTION, POWDER, LYOPHILIZED, FOR SOLUTION INTRAMUSCULAR; INTRAVENOUS ONCE
Status: DISCONTINUED | OUTPATIENT
Start: 2019-10-10 | End: 2019-10-10

## 2019-10-10 RX ORDER — ALBUTEROL SULFATE 90 UG/1
2 AEROSOL, METERED RESPIRATORY (INHALATION) 4 TIMES DAILY
Qty: 1 INHALER | Refills: 0 | Status: SHIPPED | OUTPATIENT
Start: 2019-10-10 | End: 2020-03-13 | Stop reason: SDUPTHER

## 2019-10-10 RX ORDER — ALBUTEROL SULFATE 90 UG/1
2 AEROSOL, METERED RESPIRATORY (INHALATION) 4 TIMES DAILY
Status: DISCONTINUED | OUTPATIENT
Start: 2019-10-10 | End: 2019-10-10 | Stop reason: HOSPADM

## 2019-10-10 RX ORDER — PREDNISONE 20 MG/1
20 TABLET ORAL ONCE
Status: COMPLETED | OUTPATIENT
Start: 2019-10-10 | End: 2019-10-10

## 2019-10-10 RX ADMIN — IPRATROPIUM BROMIDE AND ALBUTEROL SULFATE 1 AMPULE: .5; 3 SOLUTION RESPIRATORY (INHALATION) at 08:00

## 2019-10-10 RX ADMIN — PREDNISONE 20 MG: 20 TABLET ORAL at 07:29

## 2019-10-10 ASSESSMENT — ENCOUNTER SYMPTOMS
WHEEZING: 1
SHORTNESS OF BREATH: 1
COUGH: 1
EYE DISCHARGE: 0
ABDOMINAL PAIN: 0

## 2019-10-14 ENCOUNTER — OFFICE VISIT (OUTPATIENT)
Dept: FAMILY MEDICINE CLINIC | Age: 59
End: 2019-10-14
Payer: MEDICARE

## 2019-10-14 VITALS
OXYGEN SATURATION: 99 % | DIASTOLIC BLOOD PRESSURE: 82 MMHG | HEART RATE: 74 BPM | BODY MASS INDEX: 44.51 KG/M2 | HEIGHT: 59 IN | WEIGHT: 220.8 LBS | RESPIRATION RATE: 12 BRPM | SYSTOLIC BLOOD PRESSURE: 138 MMHG

## 2019-10-14 DIAGNOSIS — Z13.220 ENCOUNTER FOR SCREENING FOR LIPID DISORDER: ICD-10-CM

## 2019-10-14 DIAGNOSIS — E55.9 VITAMIN D DEFICIENCY: ICD-10-CM

## 2019-10-14 DIAGNOSIS — R41.3 MEMORY DIFFICULTIES: ICD-10-CM

## 2019-10-14 DIAGNOSIS — Z11.59 NEED FOR HEPATITIS C SCREENING TEST: ICD-10-CM

## 2019-10-14 DIAGNOSIS — I10 ESSENTIAL HYPERTENSION: ICD-10-CM

## 2019-10-14 DIAGNOSIS — Z11.4 ENCOUNTER FOR SCREENING FOR HIV: ICD-10-CM

## 2019-10-14 DIAGNOSIS — Z13.29 THYROID DISORDER SCREENING: ICD-10-CM

## 2019-10-14 DIAGNOSIS — R06.02 SHORT OF BREATH ON EXERTION: ICD-10-CM

## 2019-10-14 DIAGNOSIS — R68.89 COLD INTOLERANCE: ICD-10-CM

## 2019-10-14 DIAGNOSIS — J40 BRONCHITIS: ICD-10-CM

## 2019-10-14 DIAGNOSIS — F43.9 STRESS: ICD-10-CM

## 2019-10-14 DIAGNOSIS — N39.41 URGE INCONTINENCE OF URINE: Primary | ICD-10-CM

## 2019-10-14 DIAGNOSIS — Z00.00 WELLNESS EXAMINATION: ICD-10-CM

## 2019-10-14 PROCEDURE — G8417 CALC BMI ABV UP PARAM F/U: HCPCS | Performed by: NURSE PRACTITIONER

## 2019-10-14 PROCEDURE — 3017F COLORECTAL CA SCREEN DOC REV: CPT | Performed by: NURSE PRACTITIONER

## 2019-10-14 PROCEDURE — 94640 AIRWAY INHALATION TREATMENT: CPT | Performed by: NURSE PRACTITIONER

## 2019-10-14 PROCEDURE — 1036F TOBACCO NON-USER: CPT | Performed by: NURSE PRACTITIONER

## 2019-10-14 PROCEDURE — G8427 DOCREV CUR MEDS BY ELIG CLIN: HCPCS | Performed by: NURSE PRACTITIONER

## 2019-10-14 PROCEDURE — G8484 FLU IMMUNIZE NO ADMIN: HCPCS | Performed by: NURSE PRACTITIONER

## 2019-10-14 PROCEDURE — 99204 OFFICE O/P NEW MOD 45 MIN: CPT | Performed by: NURSE PRACTITIONER

## 2019-10-14 RX ORDER — OXYBUTYNIN CHLORIDE 5 MG/1
5 TABLET ORAL 2 TIMES DAILY
Qty: 60 TABLET | Refills: 5 | Status: SHIPPED | OUTPATIENT
Start: 2019-10-14 | End: 2020-03-13 | Stop reason: SDUPTHER

## 2019-10-14 ASSESSMENT — ENCOUNTER SYMPTOMS
DIARRHEA: 0
COUGH: 1
SORE THROAT: 0
TROUBLE SWALLOWING: 0
PHOTOPHOBIA: 0
CONSTIPATION: 0
BLOOD IN STOOL: 0
NAUSEA: 0
EYE DISCHARGE: 0
SHORTNESS OF BREATH: 1
ABDOMINAL PAIN: 0
ABDOMINAL DISTENTION: 0
EYE PAIN: 0
VOMITING: 0

## 2019-10-14 ASSESSMENT — PATIENT HEALTH QUESTIONNAIRE - PHQ9
2. FEELING DOWN, DEPRESSED OR HOPELESS: 0
SUM OF ALL RESPONSES TO PHQ9 QUESTIONS 1 & 2: 0
SUM OF ALL RESPONSES TO PHQ QUESTIONS 1-9: 0
1. LITTLE INTEREST OR PLEASURE IN DOING THINGS: 0
SUM OF ALL RESPONSES TO PHQ QUESTIONS 1-9: 0

## 2019-10-24 ENCOUNTER — NURSE ONLY (OUTPATIENT)
Dept: LAB | Age: 59
End: 2019-10-24

## 2019-10-24 ENCOUNTER — OFFICE VISIT (OUTPATIENT)
Dept: FAMILY MEDICINE CLINIC | Age: 59
End: 2019-10-24
Payer: MEDICARE

## 2019-10-24 VITALS
TEMPERATURE: 97.6 F | HEART RATE: 65 BPM | WEIGHT: 213 LBS | SYSTOLIC BLOOD PRESSURE: 154 MMHG | BODY MASS INDEX: 40.22 KG/M2 | HEIGHT: 61 IN | OXYGEN SATURATION: 99 % | DIASTOLIC BLOOD PRESSURE: 98 MMHG

## 2019-10-24 DIAGNOSIS — Z13.220 ENCOUNTER FOR SCREENING FOR LIPID DISORDER: ICD-10-CM

## 2019-10-24 DIAGNOSIS — R41.3 MEMORY DIFFICULTIES: ICD-10-CM

## 2019-10-24 DIAGNOSIS — Z00.00 WELLNESS EXAMINATION: ICD-10-CM

## 2019-10-24 DIAGNOSIS — Z11.4 ENCOUNTER FOR SCREENING FOR HIV: ICD-10-CM

## 2019-10-24 DIAGNOSIS — R05.9 COUGH: Primary | ICD-10-CM

## 2019-10-24 DIAGNOSIS — F43.9 STRESS: ICD-10-CM

## 2019-10-24 DIAGNOSIS — Z12.31 ENCOUNTER FOR SCREENING MAMMOGRAM FOR BREAST CANCER: ICD-10-CM

## 2019-10-24 DIAGNOSIS — E55.9 VITAMIN D DEFICIENCY: ICD-10-CM

## 2019-10-24 DIAGNOSIS — I10 ESSENTIAL HYPERTENSION: ICD-10-CM

## 2019-10-24 DIAGNOSIS — R68.89 COLD INTOLERANCE: ICD-10-CM

## 2019-10-24 DIAGNOSIS — Z12.11 SCREEN FOR COLON CANCER: ICD-10-CM

## 2019-10-24 DIAGNOSIS — N39.41 URGE INCONTINENCE OF URINE: ICD-10-CM

## 2019-10-24 DIAGNOSIS — Z13.29 THYROID DISORDER SCREENING: ICD-10-CM

## 2019-10-24 DIAGNOSIS — Z11.59 NEED FOR HEPATITIS C SCREENING TEST: ICD-10-CM

## 2019-10-24 LAB
ANION GAP SERPL CALCULATED.3IONS-SCNC: 12 MEQ/L (ref 8–16)
BUN BLDV-MCNC: 12 MG/DL (ref 7–22)
CALCIUM SERPL-MCNC: 9.4 MG/DL (ref 8.5–10.5)
CHLORIDE BLD-SCNC: 103 MEQ/L (ref 98–111)
CHOLESTEROL, TOTAL: 221 MG/DL (ref 100–199)
CO2: 24 MEQ/L (ref 23–33)
CREAT SERPL-MCNC: 0.6 MG/DL (ref 0.4–1.2)
GFR SERPL CREATININE-BSD FRML MDRD: > 90 ML/MIN/1.73M2
GLUCOSE BLD-MCNC: 92 MG/DL (ref 70–108)
HDLC SERPL-MCNC: 70 MG/DL
HEPATITIS C ANTIBODY: NEGATIVE
LDL CHOLESTEROL CALCULATED: 137 MG/DL
MAGNESIUM: 2.3 MG/DL (ref 1.6–2.4)
POTASSIUM SERPL-SCNC: 3.4 MEQ/L (ref 3.5–5.2)
SODIUM BLD-SCNC: 139 MEQ/L (ref 135–145)
TRIGL SERPL-MCNC: 68 MG/DL (ref 0–199)
TSH SERPL DL<=0.05 MIU/L-ACNC: 0.49 UIU/ML (ref 0.4–4.2)
VITAMIN D 25-HYDROXY: 13 NG/ML (ref 30–100)

## 2019-10-24 PROCEDURE — 3017F COLORECTAL CA SCREEN DOC REV: CPT | Performed by: NURSE PRACTITIONER

## 2019-10-24 PROCEDURE — G8427 DOCREV CUR MEDS BY ELIG CLIN: HCPCS | Performed by: NURSE PRACTITIONER

## 2019-10-24 PROCEDURE — G8484 FLU IMMUNIZE NO ADMIN: HCPCS | Performed by: NURSE PRACTITIONER

## 2019-10-24 PROCEDURE — 99214 OFFICE O/P EST MOD 30 MIN: CPT | Performed by: NURSE PRACTITIONER

## 2019-10-24 PROCEDURE — G8417 CALC BMI ABV UP PARAM F/U: HCPCS | Performed by: NURSE PRACTITIONER

## 2019-10-24 PROCEDURE — 1036F TOBACCO NON-USER: CPT | Performed by: NURSE PRACTITIONER

## 2019-10-24 RX ORDER — GUAIFENESIN AND DEXTROMETHORPHAN HYDROBROMIDE 600; 30 MG/1; MG/1
1 TABLET, EXTENDED RELEASE ORAL 2 TIMES DAILY
Qty: 28 TABLET | Refills: 0 | Status: SHIPPED | OUTPATIENT
Start: 2019-10-24 | End: 2020-03-13 | Stop reason: ALTCHOICE

## 2019-10-24 ASSESSMENT — ENCOUNTER SYMPTOMS
NAUSEA: 0
BLOOD IN STOOL: 0
CONSTIPATION: 0
EYE PAIN: 0
EYE DISCHARGE: 0
TROUBLE SWALLOWING: 0
COUGH: 1
VOMITING: 0
ABDOMINAL DISTENTION: 0
DIARRHEA: 0
SORE THROAT: 0
PHOTOPHOBIA: 0
SHORTNESS OF BREATH: 1
ABDOMINAL PAIN: 0

## 2019-10-25 ENCOUNTER — TELEPHONE (OUTPATIENT)
Dept: FAMILY MEDICINE CLINIC | Age: 59
End: 2019-10-25

## 2019-10-25 LAB — THYROXINE (T4): 5.9 UG/DL (ref 4.5–12)

## 2019-10-25 RX ORDER — CHOLECALCIFEROL (VITAMIN D3) 50 MCG
4000 TABLET ORAL DAILY
Qty: 60 TABLET | Refills: 5 | Status: SHIPPED | OUTPATIENT
Start: 2019-10-25 | End: 2019-11-07 | Stop reason: SDUPTHER

## 2019-10-27 LAB
DHEAS (DHEA SULFATE): 55 UG/DL (ref 26–200)
HIV-2 AB: NEGATIVE
HOMOCYSTEINE, TOTAL: 11 UMOL/L

## 2019-10-30 LAB — DHEA UNCONJUGATED: 1.23 NG/ML (ref 0.63–4.7)

## 2019-11-01 ENCOUNTER — TELEPHONE (OUTPATIENT)
Dept: FAMILY MEDICINE CLINIC | Age: 59
End: 2019-11-01

## 2019-11-07 ENCOUNTER — OFFICE VISIT (OUTPATIENT)
Dept: FAMILY MEDICINE CLINIC | Age: 59
End: 2019-11-07
Payer: MEDICARE

## 2019-11-07 VITALS
BODY MASS INDEX: 40.52 KG/M2 | HEIGHT: 61 IN | TEMPERATURE: 97.8 F | DIASTOLIC BLOOD PRESSURE: 88 MMHG | OXYGEN SATURATION: 97 % | WEIGHT: 214.6 LBS | SYSTOLIC BLOOD PRESSURE: 128 MMHG | RESPIRATION RATE: 12 BRPM | HEART RATE: 87 BPM

## 2019-11-07 DIAGNOSIS — R79.83 HOMOCYSTEINEMIA: ICD-10-CM

## 2019-11-07 DIAGNOSIS — R79.89 LOW VITAMIN D LEVEL: Primary | ICD-10-CM

## 2019-11-07 DIAGNOSIS — B35.3 TINEA PEDIS OF LEFT FOOT: ICD-10-CM

## 2019-11-07 DIAGNOSIS — D72.828 HIGH BLOOD MONOCYTE COUNT: ICD-10-CM

## 2019-11-07 DIAGNOSIS — K90.9 INTESTINAL MALABSORPTION, UNSPECIFIED TYPE: ICD-10-CM

## 2019-11-07 PROCEDURE — G8484 FLU IMMUNIZE NO ADMIN: HCPCS | Performed by: NURSE PRACTITIONER

## 2019-11-07 PROCEDURE — 3017F COLORECTAL CA SCREEN DOC REV: CPT | Performed by: NURSE PRACTITIONER

## 2019-11-07 PROCEDURE — 99214 OFFICE O/P EST MOD 30 MIN: CPT | Performed by: NURSE PRACTITIONER

## 2019-11-07 PROCEDURE — G8417 CALC BMI ABV UP PARAM F/U: HCPCS | Performed by: NURSE PRACTITIONER

## 2019-11-07 PROCEDURE — 1036F TOBACCO NON-USER: CPT | Performed by: NURSE PRACTITIONER

## 2019-11-07 PROCEDURE — G8427 DOCREV CUR MEDS BY ELIG CLIN: HCPCS | Performed by: NURSE PRACTITIONER

## 2019-11-07 RX ORDER — CHOLECALCIFEROL (VITAMIN D3) 50 MCG
4000 TABLET ORAL DAILY
Qty: 60 TABLET | Refills: 5 | Status: SHIPPED | OUTPATIENT
Start: 2019-11-07 | End: 2020-03-13 | Stop reason: SDUPTHER

## 2019-11-07 RX ORDER — CLOTRIMAZOLE 1 %
CREAM (GRAM) TOPICAL
Qty: 1 TUBE | Refills: 3 | Status: SHIPPED | OUTPATIENT
Start: 2019-11-07 | End: 2019-11-14

## 2019-11-07 ASSESSMENT — ENCOUNTER SYMPTOMS
NAUSEA: 0
ABDOMINAL DISTENTION: 0
TROUBLE SWALLOWING: 0
BLOOD IN STOOL: 0
ABDOMINAL PAIN: 0
EYE DISCHARGE: 0
SHORTNESS OF BREATH: 0
VOMITING: 0
EYE PAIN: 0
DIARRHEA: 0
CONSTIPATION: 0
PHOTOPHOBIA: 0
SORE THROAT: 0

## 2020-03-13 ENCOUNTER — OFFICE VISIT (OUTPATIENT)
Dept: FAMILY MEDICINE CLINIC | Age: 60
End: 2020-03-13
Payer: MEDICARE

## 2020-03-13 VITALS
DIASTOLIC BLOOD PRESSURE: 82 MMHG | OXYGEN SATURATION: 98 % | TEMPERATURE: 98 F | SYSTOLIC BLOOD PRESSURE: 130 MMHG | BODY MASS INDEX: 40.37 KG/M2 | WEIGHT: 213.8 LBS | HEART RATE: 68 BPM | RESPIRATION RATE: 16 BRPM | HEIGHT: 61 IN

## 2020-03-13 PROCEDURE — 99214 OFFICE O/P EST MOD 30 MIN: CPT | Performed by: NURSE PRACTITIONER

## 2020-03-13 PROCEDURE — G8427 DOCREV CUR MEDS BY ELIG CLIN: HCPCS | Performed by: NURSE PRACTITIONER

## 2020-03-13 PROCEDURE — 1036F TOBACCO NON-USER: CPT | Performed by: NURSE PRACTITIONER

## 2020-03-13 PROCEDURE — G8484 FLU IMMUNIZE NO ADMIN: HCPCS | Performed by: NURSE PRACTITIONER

## 2020-03-13 PROCEDURE — 3017F COLORECTAL CA SCREEN DOC REV: CPT | Performed by: NURSE PRACTITIONER

## 2020-03-13 PROCEDURE — G8417 CALC BMI ABV UP PARAM F/U: HCPCS | Performed by: NURSE PRACTITIONER

## 2020-03-13 RX ORDER — ALBUTEROL SULFATE 90 UG/1
2 AEROSOL, METERED RESPIRATORY (INHALATION) 4 TIMES DAILY
Qty: 1 INHALER | Refills: 0 | Status: SHIPPED | OUTPATIENT
Start: 2020-03-13 | End: 2021-05-20 | Stop reason: ALTCHOICE

## 2020-03-13 RX ORDER — TERBINAFINE HYDROCHLORIDE 250 MG/1
250 TABLET ORAL DAILY
Qty: 84 TABLET | Refills: 0 | Status: SHIPPED | OUTPATIENT
Start: 2020-03-13 | End: 2020-06-05

## 2020-03-13 RX ORDER — CHOLECALCIFEROL (VITAMIN D3) 50 MCG
4000 TABLET ORAL DAILY
Qty: 180 TABLET | Refills: 3 | Status: SHIPPED | OUTPATIENT
Start: 2020-03-13 | End: 2020-11-03

## 2020-03-13 RX ORDER — OXYBUTYNIN CHLORIDE 5 MG/1
5 TABLET ORAL 2 TIMES DAILY
Qty: 60 TABLET | Refills: 5 | Status: SHIPPED | OUTPATIENT
Start: 2020-03-13 | End: 2021-06-30 | Stop reason: SDUPTHER

## 2020-03-13 SDOH — ECONOMIC STABILITY: FOOD INSECURITY: WITHIN THE PAST 12 MONTHS, THE FOOD YOU BOUGHT JUST DIDN'T LAST AND YOU DIDN'T HAVE MONEY TO GET MORE.: NEVER TRUE

## 2020-03-13 SDOH — ECONOMIC STABILITY: TRANSPORTATION INSECURITY
IN THE PAST 12 MONTHS, HAS LACK OF TRANSPORTATION KEPT YOU FROM MEETINGS, WORK, OR FROM GETTING THINGS NEEDED FOR DAILY LIVING?: NO

## 2020-03-13 SDOH — ECONOMIC STABILITY: TRANSPORTATION INSECURITY
IN THE PAST 12 MONTHS, HAS THE LACK OF TRANSPORTATION KEPT YOU FROM MEDICAL APPOINTMENTS OR FROM GETTING MEDICATIONS?: NO

## 2020-03-13 SDOH — ECONOMIC STABILITY: INCOME INSECURITY: HOW HARD IS IT FOR YOU TO PAY FOR THE VERY BASICS LIKE FOOD, HOUSING, MEDICAL CARE, AND HEATING?: SOMEWHAT HARD

## 2020-03-13 SDOH — ECONOMIC STABILITY: FOOD INSECURITY: WITHIN THE PAST 12 MONTHS, YOU WORRIED THAT YOUR FOOD WOULD RUN OUT BEFORE YOU GOT MONEY TO BUY MORE.: NEVER TRUE

## 2020-03-13 ASSESSMENT — ENCOUNTER SYMPTOMS
DIARRHEA: 0
EYE PAIN: 0
BLOOD IN STOOL: 0
SHORTNESS OF BREATH: 0
PHOTOPHOBIA: 0
ABDOMINAL PAIN: 0
EYE DISCHARGE: 0
SORE THROAT: 0
ABDOMINAL DISTENTION: 0
VOMITING: 0
CONSTIPATION: 0
TROUBLE SWALLOWING: 0
NAUSEA: 0

## 2020-03-13 ASSESSMENT — PATIENT HEALTH QUESTIONNAIRE - PHQ9
SUM OF ALL RESPONSES TO PHQ QUESTIONS 1-9: 0
SUM OF ALL RESPONSES TO PHQ QUESTIONS 1-9: 0
1. LITTLE INTEREST OR PLEASURE IN DOING THINGS: 0
SUM OF ALL RESPONSES TO PHQ9 QUESTIONS 1 & 2: 0
2. FEELING DOWN, DEPRESSED OR HOPELESS: 0

## 2020-03-13 NOTE — PATIENT INSTRUCTIONS
Thank you   1. Thank you for trusting us with your healthcare needs. You may receive a survey regarding today's visit. It would help us out if you would take a few moments to provide your feedback. We value your input. 2. Please bring in ALL medications BOTTLES, including inhalers, herbal supplements, over the counter, prescribed & non-prescribed medicine. The office would like actual medication bottles and a list.   3. Please note our OFFICE POLICIES:   a. Prior to getting your labs drawn, please check with your insurance company for benefits and eligibility of lab services. Often, insurance companies cover certain tests for preventative visits only. It is patient's responsibility to see what is covered. b. We are unable to change a diagnosis after the test has been performed. c. Lab orders will not be re-printed. Please hold onto your original lab orders and take them to your lab to be completed. d. If you no show your scheduled appointment three times, you will be dismissed from this practice. e. [facility] must be completed 24 hours prior to your schedule appointment. 4. If the list below has been completed, PLEASE FAX RECORDS TO OUR OFFICE @ [phone].  Once the records have been received we will update your records at our office:  Health Maintenance Due   Topic Date Due    DTaP/Tdap/Td vaccine (1 - Tdap) 08/16/1979    Shingles Vaccine (1 of 2) 08/16/2010    Colon Cancer Screen FIT/FOBT  06/17/2017    Breast cancer screen  11/05/2018    Flu vaccine (1) 09/01/2019

## 2020-03-13 NOTE — PROGRESS NOTES
Health Maintenance Due   Topic Date Due    DTaP/Tdap/Td vaccine (1 - Tdap) 08/16/1979    Shingles Vaccine (1 of 2) 08/16/2010    Colon Cancer Screen FIT/FOBT  06/17/2017    Breast cancer screen  11/05/2018    Flu vaccine (1) 09/01/2019
stomach    Glaucoma Neg Hx      Social History     Tobacco Use    Smoking status: Former Smoker     Packs/day: 0.00     Years: 5.00     Pack years: 0.00     Types: Cigars     Last attempt to quit: 2011     Years since quittin.8    Smokeless tobacco: Never Used   Substance Use Topics    Alcohol use: Yes     Alcohol/week: 2.0 standard drinks     Types: 1 Glasses of wine, 1 Cans of beer per week     Comment: occasion      Current Outpatient Medications   Medication Sig Dispense Refill    Blood Pressure Monitoring (BLOOD PRESSURE MONITOR/M CUFF) MISC Check blood pressure 2 times aday 1 each 0    albuterol sulfate HFA (VENTOLIN HFA) 108 (90 Base) MCG/ACT inhaler Inhale 2 puffs into the lungs 4 times daily 1 Inhaler 0    oxybutynin (DITROPAN) 5 MG tablet Take 1 tablet by mouth 2 times daily 60 tablet 5    terbinafine (LAMISIL) 250 MG tablet Take 1 tablet by mouth daily 84 tablet 0    Cholecalciferol (VITAMIN D) 50 MCG (2000 UT) TABS tablet Take 2 tablets by mouth daily for a week then starting the second week continue by taking 2 capsules daily 180 tablet 3    ciclopirox (PENLAC) 8 % solution Apply to affected toenails topically nightly. (Patient not taking: Reported on 3/13/2020) 1 Bottle 1     No current facility-administered medications for this visit. No Known Allergies    Subjective:    Review of Systems   Constitutional: Negative for chills, fatigue, fever and unexpected weight change. HENT: Negative for congestion, ear discharge, ear pain, hearing loss, postnasal drip, sneezing, sore throat and trouble swallowing. Eyes: Negative for photophobia, pain and discharge. Respiratory: Negative for shortness of breath. Cardiovascular: Negative for chest pain (with breathing) and palpitations. Gastrointestinal: Negative for abdominal distention, abdominal pain, blood in stool, constipation, diarrhea, nausea and vomiting. Endocrine: Positive for cold intolerance.    Genitourinary: Negative

## 2020-07-26 ENCOUNTER — HOSPITAL ENCOUNTER (EMERGENCY)
Age: 60
Discharge: HOME | End: 2020-07-26
Payer: MEDICARE

## 2020-07-26 VITALS
DIASTOLIC BLOOD PRESSURE: 91 MMHG | HEART RATE: 86 BPM | RESPIRATION RATE: 16 BRPM | BODY MASS INDEX: 42.33 KG/M2 | TEMPERATURE: 98.9 F | OXYGEN SATURATION: 100 % | WEIGHT: 210 LBS | HEIGHT: 59 IN | SYSTOLIC BLOOD PRESSURE: 139 MMHG

## 2020-07-26 PROCEDURE — 2500000003 HC RX 250 WO HCPCS

## 2020-07-26 PROCEDURE — 99282 EMERGENCY DEPT VISIT SF MDM: CPT

## 2020-07-26 RX ORDER — PROPARACAINE HYDROCHLORIDE 5 MG/ML
SOLUTION/ DROPS OPHTHALMIC
Status: DISCONTINUED
Start: 2020-07-26 | End: 2020-07-26 | Stop reason: HOSPADM

## 2020-07-26 RX ORDER — SULFAMETHOXAZOLE AND TRIMETHOPRIM 800; 160 MG/1; MG/1
1 TABLET ORAL 2 TIMES DAILY
Qty: 14 TABLET | Refills: 0 | Status: SHIPPED | OUTPATIENT
Start: 2020-07-26 | End: 2020-08-02

## 2020-07-26 RX ORDER — PROPARACAINE HYDROCHLORIDE 5 MG/ML
1 SOLUTION/ DROPS OPHTHALMIC ONCE
Status: COMPLETED | OUTPATIENT
Start: 2020-07-26 | End: 2020-07-26

## 2020-07-26 RX ORDER — PROPARACAINE HYDROCHLORIDE 5 MG/ML
SOLUTION/ DROPS OPHTHALMIC
Status: COMPLETED
Start: 2020-07-26 | End: 2020-07-26

## 2020-07-26 RX ADMIN — PROPARACAINE HYDROCHLORIDE 1 DROP: 5 SOLUTION/ DROPS OPHTHALMIC at 14:14

## 2020-07-26 ASSESSMENT — ENCOUNTER SYMPTOMS
EYE ITCHING: 0
ABDOMINAL PAIN: 0
EYE DISCHARGE: 1
COUGH: 0
PHOTOPHOBIA: 0
SHORTNESS OF BREATH: 0
EYE REDNESS: 0
COLOR CHANGE: 0
EYE PAIN: 1

## 2020-07-26 ASSESSMENT — PAIN SCALES - GENERAL: PAINLEVEL_OUTOF10: 4

## 2020-07-26 ASSESSMENT — VISUAL ACUITY
OU: 1
OS: 20/40
OU: 20/30
OD: 20/40

## 2020-07-26 ASSESSMENT — PAIN DESCRIPTION - PAIN TYPE: TYPE: ACUTE PAIN

## 2020-07-26 ASSESSMENT — PAIN DESCRIPTION - LOCATION: LOCATION: EYE

## 2020-07-26 ASSESSMENT — PAIN DESCRIPTION - ORIENTATION: ORIENTATION: LEFT

## 2020-07-26 NOTE — ED NOTES
Patient presents to ED with complaint of left eye pain and irritation. Patient states she has been using Visine in left eye, but still feels like something is in it. Patient denies known injury.      Liza Jo RN  07/26/20 0677

## 2020-07-26 NOTE — ED PROVIDER NOTES
[facility] Emergency Department    CHIEF COMPLAINT       Chief Complaint   Patient presents with    Eye Pain     left       Nurses Notes reviewed and I agree except as noted in the HPI. HISTORY OF PRESENT ILLNESS    Katrin Robert tima 61 y.o. female who presents to the ED for evaluation of left eye discomfort. Patient states one week ago she felt like something was stuck under the left eyelid. Patient states the feeling has not gone away and was bothering her too much and came to the ED today. Patient has noticed a white discharge has been coming from her eye. Patient states she has tried lifting her eyelid up but cannot see anything inside of her eyelid. Patient has been using Visine multiple times a day without relief. Patient states she had a similar event many years ago and flushed her eye at home. Patient denies allergies, cough, shortness of breath, fever. HPI was provided by the patient. REVIEW OF SYSTEMS     Review of Systems   Constitutional: Negative for fever. HENT: Negative for congestion. Eyes: Positive for pain (left) and discharge. Negative for photophobia, redness, itching and visual disturbance. Respiratory: Negative for cough and shortness of breath. Cardiovascular: Negative for chest pain. Gastrointestinal: Negative for abdominal pain. Skin: Negative for color change. Allergic/Immunologic: Negative for immunocompromised state. PAST MEDICAL HISTORY     Past Medical History:   Diagnosis Date    Anemia     Infertility female     Obesity     Pap smear, as part of routine gynecological examination yrs    Visit for screening mammogram yrs       SURGICALHISTORY      has a past surgical history that includes Ectopic pregnancy surgery.     CURRENT MEDICATIONS       Discharge Medication List as of 7/26/2020  2:05 PM      CONTINUE these medications which have NOT CHANGED    Details   Blood Pressure Monitoring (BLOOD PRESSURE MONITOR/M CUFF) MISC Disp-1 each, R-0, Not on file     Attends Mosque service: Not on file     Active member of club or organization: Not on file     Attends meetings of clubs or organizations: Not on file     Relationship status: Not on file    Intimate partner violence     Fear of current or ex partner: Not on file     Emotionally abused: Not on file     Physically abused: Not on file     Forced sexual activity: Not on file   Other Topics Concern    Not on file   Social History Narrative    Not on file       PHYSICAL EXAM     INITIAL VITALS:  height is 4' 11\" (1.499 m) and weight is 210 lb (95.3 kg). Her oral temperature is 98.9 °F (37.2 °C). Her blood pressure is 139/91 (abnormal) and her pulse is 86. Her respiration is 16 and oxygen saturation is 100%. Physical Exam  HENT:      Head: Normocephalic. Right Ear: External ear normal.      Left Ear: External ear normal.   Eyes:      General: Vision grossly intact. Left eye: Discharge and hordeolum present. No foreign body. Conjunctiva/sclera: Conjunctivae normal.      Pupils:      Left eye: Pupil is round, reactive and not sluggish. No corneal abrasion. Slit lamp exam:     Left eye: No corneal ulcer, foreign body or photophobia. Cardiovascular:      Rate and Rhythm: Normal rate. Pulmonary:      Effort: Pulmonary effort is normal.   Neurological:      General: No focal deficit present. Mental Status: She is alert and oriented to person, place, and time. Psychiatric:         Mood and Affect: Mood normal.         Thought Content: Thought content normal.         DIFFERENTIAL DIAGNOSIS:   Hordeolum, chalazion, plugged lacrimal duct    DIAGNOSTIC RESULTS       RADIOLOGY: non-plainfilm images(s) such as CT, Ultrasound and MRI are read by the radiologist.  Plain radiographic images are visualized and preliminarily interpreted by the emergency physician unless otherwise stated below.   No orders to display         LABS:   Labs Reviewed - No data to display    EMERGENCY DEPARTMENT COURSE:   Vitals:    Vitals:    07/26/20 1306   BP: (!) 139/91   Pulse: 86   Resp: 16   Temp: 98.9 °F (37.2 °C)   TempSrc: Oral   SpO2: 100%   Weight: 210 lb (95.3 kg)   Height: 4' 11\" (1.499 m)       MDM    Patient was seen and evaluated in the emergency department, patient appeared to be no acute distress, vital signs were reviewed, no significant findings were noted. Physical exam was completed, she had a hordeolum to the left upper eyelid, there was no corneal abrasion, there is no injection to the cornea, her vision was intact. I discussed my findings my plan of care the patient she is amenable with discharge. She then showed me her right lower leg, she noted an infection. She noted that she had a abscess that she drained, she thought it was possibly a spider bite. She denies any significant active drainage. Minimal pain. I noted that I would provide the patient with Bactrim to cover for possible infection. She was agreeable with this. She is advised to follow-up with ophthalmology if eye does not improve. She verbalized understanding. While here in the emergency department she maintained stable course appropriate for discharge. Medications   proparacaine (ALCAINE) 0.5 % ophthalmic solution 1 drop (1 drop Left Eye Given by Other 7/26/20 5078)       Patient was seenindependently by myself. The patient's final impression and disposition and plan was determined by myself. CRITICAL CARE:   None    CONSULTS:  None    PROCEDURES:  None    FINAL IMPRESSION     1. Hordeolum internum of left upper eyelid    2.  Abscess          DISPOSITION/PLAN   Patient discharged in stable condition    PATIENT REFERREDTO:  Masood Mattson, APRN - CNP  [facility] [address]  [phone]    Call   For follow up and evaluation    Alfa Aquino MD  [address] [facility] [address]  [phone]    Call in 1 week  If symptoms worsen or fail to improve      DISCHARGE MEDICATIONS:  Discharge Medication List as of 7/26/2020  2:05 PM      START taking these medications    Details   sulfamethoxazole-trimethoprim (BACTRIM DS) 800-160 MG per tablet Take 1 tablet by mouth 2 times daily for 7 days, Disp-14 tablet,R-0Print             (Please note that portions of this note were completed with a voice recognition program.  Efforts were made to edit the dictations but occasionally words are mis-transcribed.)    Provider:  I personally performed the services described in the documentation,reviewed and edited the documentation which was dictated to the scribe in my presence, and it accurately records my words and actions.     Toan Montanez CNP 07/26/20 5:51 PM    Felipe Montanez, APRN - CNP        [employer], APRROSARIO - CNP  07/26/20 4800

## 2020-07-27 ENCOUNTER — TELEPHONE (OUTPATIENT)
Dept: FAMILY MEDICINE CLINIC | Age: 60
End: 2020-07-27

## 2020-07-27 NOTE — TELEPHONE ENCOUNTER
[address] [facility]  [address], [address]  Phone: [phone]  Fax: [phone]    July 27, 2020    Elle Feldman [facility] [address] [id number]    Dear Huma Rivas,    This letter is regarding your Emergency Department (ED) visit at [address] [address] on 7/26/20. Priscilla Rasmussen wanted to make sure that you understand your discharge instructions and that you were able to fill any prescriptions that may have been ordered for you. Please contact the office at the above phone number if the ED advised you to follow up with Priscilla Rasmussen, or if you have any further questions or needs. Also did you know -   *Visiting the ED for a non-emergency could result in higher co-pays than you would normally be subject to paying? *You can call your doctor even after hours so they can direct you to the most appropriate care. [facility]) practices can often offer you an appointment on the same day that you call. *We have some [facility] offices that offer Walk-in appointments; check our website for availability in your community, www. [employer].      *Evisits are now available for patients for $36 through [employer] for certain conditions:  * Sinus, cold and or cough       * Diarrhea            * Headache  * Heartburn                                * Poison Deanna          * Back pain     * Urinary problems                         If you do not have [employer] and are interested, please contact the office and a staff member may assist you or go to [email or web address].     Sincerely,   JOANNA Falcon CNP and your [facility]

## 2020-07-27 NOTE — LETTER
Tory Padilla [address] [facility]  [address], [address]  Phone: [phone]  Fax: [phone]    July 27, 2020    Anna Feldman [facility] [address] [id number]    Dear Peggy Christopher,    This letter is regarding your Emergency Department (ED) visit at [address] [address] on 7/26/20. Brant Ng wanted to make sure that you understand your discharge instructions and that you were able to fill any prescriptions that may have been ordered for you. Please contact the office at the above phone number if the ED advised you to follow up with Brant Ng, or if you have any further questions or needs. Also did you know -   *Visiting the ED for a non-emergency could result in higher co-pays than you would normally be subject to paying? *You can call your doctor even after hours so they can direct you to the most appropriate care. [facility]) practices can often offer you an appointment on the same day that you call. *We have some [facility] offices that offer Walk-in appointments; check our website for availability in your community, www. [email or web address].      *Evisits are now available for patients for $36 through "[employer]" for certain conditions:  * Sinus, cold and or cough       * Diarrhea            * Headache  * Heartburn                                * Poison Deanna          * Back pain     * Urinary problems                         If you do not have [employer] and are interested, please contact the office and a staff member may assist you or go to [email or web address].     Sincerely,   JOANNA Haro CNP and your [facility]

## 2020-08-21 ENCOUNTER — HOSPITAL ENCOUNTER (EMERGENCY)
Age: 60
Discharge: HOME | End: 2020-08-22
Attending: EMERGENCY MEDICINE
Payer: MEDICARE

## 2020-08-21 VITALS
HEART RATE: 70 BPM | WEIGHT: 215 LBS | OXYGEN SATURATION: 96 % | SYSTOLIC BLOOD PRESSURE: 136 MMHG | TEMPERATURE: 98.9 F | BODY MASS INDEX: 43.34 KG/M2 | RESPIRATION RATE: 18 BRPM | DIASTOLIC BLOOD PRESSURE: 86 MMHG | HEIGHT: 59 IN

## 2020-08-21 LAB
ALBUMIN SERPL-MCNC: 3.7 G/DL (ref 3.5–5.1)
ALP BLD-CCNC: 108 U/L (ref 38–126)
ALT SERPL-CCNC: 10 U/L (ref 11–66)
ANION GAP SERPL CALCULATED.3IONS-SCNC: 8 MEQ/L (ref 8–16)
AST SERPL-CCNC: 16 U/L (ref 5–40)
BASOPHILS # BLD: 0.3 %
BASOPHILS ABSOLUTE: 0 THOU/MM3 (ref 0–0.1)
BILIRUB SERPL-MCNC: 0.5 MG/DL (ref 0.3–1.2)
BUN BLDV-MCNC: 9 MG/DL (ref 7–22)
CALCIUM SERPL-MCNC: 9.3 MG/DL (ref 8.5–10.5)
CHLORIDE BLD-SCNC: 104 MEQ/L (ref 98–111)
CO2: 23 MEQ/L (ref 23–33)
CREAT SERPL-MCNC: 0.8 MG/DL (ref 0.4–1.2)
EKG ATRIAL RATE: 75 BPM
EKG P AXIS: 34 DEGREES
EKG P-R INTERVAL: 148 MS
EKG Q-T INTERVAL: 356 MS
EKG QRS DURATION: 82 MS
EKG QTC CALCULATION (BAZETT): 397 MS
EKG R AXIS: -30 DEGREES
EKG T AXIS: -21 DEGREES
EKG VENTRICULAR RATE: 75 BPM
EOSINOPHIL # BLD: 0.8 %
EOSINOPHILS ABSOLUTE: 0 THOU/MM3 (ref 0–0.4)
ERYTHROCYTE [DISTWIDTH] IN BLOOD BY AUTOMATED COUNT: 14.8 % (ref 11.5–14.5)
ERYTHROCYTE [DISTWIDTH] IN BLOOD BY AUTOMATED COUNT: 45.4 FL (ref 35–45)
GLUCOSE BLD-MCNC: 101 MG/DL (ref 70–108)
HCT VFR BLD CALC: 44.2 % (ref 37–47)
HEMOGLOBIN: 13.6 GM/DL (ref 12–16)
IMMATURE GRANS (ABS): 0.02 THOU/MM3 (ref 0–0.07)
IMMATURE GRANULOCYTES: 0.3 %
LYMPHOCYTES # BLD: 33.8 %
LYMPHOCYTES ABSOLUTE: 2 THOU/MM3 (ref 1–4.8)
MAGNESIUM: 2.2 MG/DL (ref 1.6–2.4)
MCH RBC QN AUTO: 26 PG (ref 26–33)
MCHC RBC AUTO-ENTMCNC: 30.8 GM/DL (ref 32.2–35.5)
MCV RBC AUTO: 84.5 FL (ref 81–99)
MONOCYTES # BLD: 7.7 %
MONOCYTES ABSOLUTE: 0.5 THOU/MM3 (ref 0.4–1.3)
NUCLEATED RED BLOOD CELLS: 0 /100 WBC
OSMOLALITY CALCULATION: 268.9 MOSMOL/KG (ref 275–300)
PHOSPHORUS: 2.5 MG/DL (ref 2.4–4.7)
PLATELET # BLD: 231 THOU/MM3 (ref 130–400)
PMV BLD AUTO: 10.7 FL (ref 9.4–12.4)
POTASSIUM SERPL-SCNC: 3.6 MEQ/L (ref 3.5–5.2)
RBC # BLD: 5.23 MILL/MM3 (ref 4.2–5.4)
SEG NEUTROPHILS: 57.1 %
SEGMENTED NEUTROPHILS ABSOLUTE COUNT: 3.4 THOU/MM3 (ref 1.8–7.7)
SODIUM BLD-SCNC: 135 MEQ/L (ref 135–145)
TOTAL PROTEIN: 7.5 G/DL (ref 6.1–8)
WBC # BLD: 6 THOU/MM3 (ref 4.8–10.8)

## 2020-08-21 PROCEDURE — 84100 ASSAY OF PHOSPHORUS: CPT

## 2020-08-21 PROCEDURE — 83735 ASSAY OF MAGNESIUM: CPT

## 2020-08-21 PROCEDURE — 99284 EMERGENCY DEPT VISIT MOD MDM: CPT

## 2020-08-21 PROCEDURE — 36415 COLL VENOUS BLD VENIPUNCTURE: CPT

## 2020-08-21 PROCEDURE — 2709999900 HC NON-CHARGEABLE SUPPLY

## 2020-08-21 PROCEDURE — 81003 URINALYSIS AUTO W/O SCOPE: CPT

## 2020-08-21 PROCEDURE — 80053 COMPREHEN METABOLIC PANEL: CPT

## 2020-08-21 PROCEDURE — 93005 ELECTROCARDIOGRAM TRACING: CPT | Performed by: EMERGENCY MEDICINE

## 2020-08-21 PROCEDURE — 99285 EMERGENCY DEPT VISIT HI MDM: CPT

## 2020-08-21 PROCEDURE — 85025 COMPLETE CBC W/AUTO DIFF WBC: CPT

## 2020-08-21 ASSESSMENT — PAIN DESCRIPTION - PAIN TYPE: TYPE: ACUTE PAIN

## 2020-08-21 ASSESSMENT — PAIN SCALES - GENERAL: PAINLEVEL_OUTOF10: 5

## 2020-08-21 ASSESSMENT — PAIN DESCRIPTION - ORIENTATION: ORIENTATION: MID;LOWER

## 2020-08-21 ASSESSMENT — PAIN DESCRIPTION - DESCRIPTORS: DESCRIPTORS: ACHING;CONSTANT

## 2020-08-21 ASSESSMENT — PAIN DESCRIPTION - LOCATION: LOCATION: BACK

## 2020-08-21 ASSESSMENT — PAIN DESCRIPTION - FREQUENCY: FREQUENCY: CONTINUOUS

## 2020-08-22 ENCOUNTER — APPOINTMENT (OUTPATIENT)
Dept: CT IMAGING | Age: 60
End: 2020-08-22
Payer: MEDICARE

## 2020-08-22 LAB
BILIRUBIN URINE: NEGATIVE
BLOOD, URINE: NEGATIVE
CHARACTER, URINE: CLEAR
COLOR: YELLOW
GLUCOSE URINE: NEGATIVE MG/DL
KETONES, URINE: NEGATIVE
LEUKOCYTE ESTERASE, URINE: NEGATIVE
NITRITE, URINE: NEGATIVE
PH UA: 7.5 (ref 5–9)
PROTEIN UA: NEGATIVE
SPECIFIC GRAVITY, URINE: 1.01 (ref 1–1.03)
UROBILINOGEN, URINE: 0.2 EU/DL (ref 0–1)

## 2020-08-22 PROCEDURE — 74176 CT ABD & PELVIS W/O CONTRAST: CPT

## 2020-08-22 PROCEDURE — 76376 3D RENDER W/INTRP POSTPROCES: CPT

## 2020-08-22 PROCEDURE — 6370000000 HC RX 637 (ALT 250 FOR IP): Performed by: EMERGENCY MEDICINE

## 2020-08-22 RX ORDER — LIDOCAINE 4 G/G
1 PATCH TOPICAL DAILY
Status: DISCONTINUED | OUTPATIENT
Start: 2020-08-22 | End: 2020-08-22 | Stop reason: HOSPADM

## 2020-08-22 RX ORDER — KETOROLAC TROMETHAMINE 30 MG/ML
15 INJECTION, SOLUTION INTRAMUSCULAR; INTRAVENOUS ONCE
Status: DISCONTINUED | OUTPATIENT
Start: 2020-08-22 | End: 2020-08-22 | Stop reason: HOSPADM

## 2020-08-22 RX ORDER — ACETAMINOPHEN 325 MG/1
650 TABLET ORAL ONCE
Status: COMPLETED | OUTPATIENT
Start: 2020-08-22 | End: 2020-08-22

## 2020-08-22 RX ADMIN — ACETAMINOPHEN 650 MG: 325 TABLET ORAL at 00:45

## 2020-08-22 ASSESSMENT — ENCOUNTER SYMPTOMS
VOMITING: 0
NAUSEA: 0
COLOR CHANGE: 0
EYE PAIN: 0
COUGH: 0
BLOOD IN STOOL: 0
EYE DISCHARGE: 0
EYE REDNESS: 0
SORE THROAT: 0
DIARRHEA: 0
CHEST TIGHTNESS: 0
WHEEZING: 0
BACK PAIN: 1
CONSTIPATION: 0
SHORTNESS OF BREATH: 0
ABDOMINAL PAIN: 0
TROUBLE SWALLOWING: 0

## 2020-08-22 ASSESSMENT — PAIN SCALES - WONG BAKER: WONGBAKER_NUMERICALRESPONSE: 2

## 2020-08-22 NOTE — ED NOTES
Pt resting in bed, denies needs currently. Updated on POC. Respirs unlabored, VSS.  Call light within reach     Charlet Koyanagi  08/21/20 6212

## 2020-08-22 NOTE — ED TRIAGE NOTES
Pt to ED via private vehicle. Pt c/o numbness and pain along the lower middle portion of her back, extending to either hip. Pt stated this started this morning at 0630 and has gotten worse. Pt states this has happened before about a year ago, but just went away so she didn't think anything else of it until today when it returned. Pt denies chest pain, SOB or numbness/tingling anywhere else. VSS. Respirs unlabored. EKG complete. Call light in place.

## 2020-08-22 NOTE — ED NOTES
Pt refused toradol injection. Pt given tylenol per MAR.  Denies needs currently     [facility]  08/22/20 [phone]

## 2020-08-22 NOTE — ED NOTES
Pt ambulated to bathroom, urine sample collected. Tolerated ambulation well.  Denies needs currently, call light in place     Jesus Murphy  08/21/20 3302

## 2020-08-22 NOTE — ED PROVIDER NOTES
[facility] 65 22 COMPLAINT       Chief Complaint   Patient presents with    Numbness       Nurses Notes reviewed and I agree except as noted in the HPI. HISTORY OF PRESENT ILLNESS    Justus Conn is a 61 y.o. female who presents to the emergency department due to low back pain and pressure. Patient states that symptoms began this morning and have been slowly increasing throughout the day. She has been able to walk and bear weight. She describes the sensation in her lower back as a pressure that sometimes radiates into the posterior thigh. She denies bowel or bladder incontinence. Denies dysuria or hematuria. She denies fever, recent fall, lifting heavy objects, or other trauma. She denies any history of back surgery. She reports that she has had similar symptoms in the past but these have always went away after period of time. Due to the pressure sensation not going away today she decided to come to the emergency department. No numbness, tingling, or weakness. No other complaints or concerns. REVIEW OF SYSTEMS     Review of Systems   Constitutional: Negative for chills, diaphoresis, fatigue and fever. HENT: Negative for ear discharge, nosebleeds, sneezing, sore throat and trouble swallowing. Eyes: Negative for pain, discharge, redness and visual disturbance. Respiratory: Negative for cough, chest tightness, shortness of breath and wheezing. Cardiovascular: Negative for chest pain, palpitations and leg swelling. Gastrointestinal: Negative for abdominal pain, blood in stool, constipation, diarrhea, nausea and vomiting. Endocrine: Negative for cold intolerance, heat intolerance, polydipsia and polyphagia. Genitourinary: Negative for difficulty urinating, frequency, hematuria, pelvic pain, vaginal bleeding and vaginal discharge. Musculoskeletal: Positive for back pain.  Negative for arthralgias, joint swelling and myalgias. Skin: Negative for color change and rash. Allergic/Immunologic: Negative for food allergies and immunocompromised state. Neurological: Negative for dizziness, seizures, syncope, speech difficulty, weakness, light-headedness, numbness and headaches. Hematological: Negative for adenopathy. Does not bruise/bleed easily. Psychiatric/Behavioral: Negative for confusion, hallucinations and suicidal ideas. The patient is not nervous/anxious. All other systems reviewed and are negative. PAST MEDICAL HISTORY    has a past medical history of Anemia, Infertility female, Obesity, Pap smear, as part of routine gynecological examination, and Visit for screening mammogram.    SURGICAL HISTORY      has a past surgical history that includes Ectopic pregnancy surgery. CURRENT MEDICATIONS       Discharge Medication List as of 2020  1:58 AM      CONTINUE these medications which have NOT CHANGED    Details   Blood Pressure Monitoring (BLOOD PRESSURE MONITOR/M CUFF) MISC Disp-1 each, R-0, PrintCheck blood pressure 2 times aday      albuterol sulfate HFA (VENTOLIN HFA) 108 (90 Base) MCG/ACT inhaler Inhale 2 puffs into the lungs 4 times daily, Disp-1 Inhaler, R-0Normal      oxybutynin (DITROPAN) 5 MG tablet Take 1 tablet by mouth 2 times daily, Disp-60 tablet, R-5Normal      Cholecalciferol (VITAMIN D) 50 MCG (2000 UT) TABS tablet Take 2 tablets by mouth daily for a week then starting the second week continue by taking 2 capsules daily, Disp-180 tablet, R-3Normal             ALLERGIES     has No Known Allergies. FAMILY HISTORY     She indicated that her mother is . She indicated that her father is . She indicated that her brother is . She indicated that the status of her neg hx is unknown.   family history includes Cancer in her brother and father; Heart Disease in her mother. SOCIAL HISTORY      reports that she quit smoking about 9 years ago.  Her smoking use included cigars. She smoked 0.00 packs per day for 5.00 years. She has never used smokeless tobacco. She reports current alcohol use of about 2.0 standard drinks of alcohol per week. She reports that she does not use drugs. PHYSICAL EXAM     INITIAL VITALS:  height is 4' 11\" (1.499 m) and weight is 215 lb (97.5 kg). Her oral temperature is 98.9 °F (37.2 °C). Her blood pressure is 136/86 and her pulse is 70. Her respiration is 18 and oxygen saturation is 96%. CONSTITUTIONAL: [Awake, alert, non toxic, well developed, well nourished, no acute distress patient is laying on the stretcher on her left side with her hips and knees flexed at 90 degrees. She is resting quietly.]  HEAD: [Normocephalic, atraumatic]  EYES: [Pupils equal, round & reactive to light, extraocular movements intact, no nystagmus, clear conjunctiva, non-icteric sclera]  ENT: [External ear canal clear without evidence of cerumen impaction or foreign body, TM's clear without erythema or bulging. Nares patent without drainage, septum appears midline. Moist mucus membranes, oropharynx clear without exudate, erythema, or mass. Uvula midline]  NECK: [Nontender and supple. No meningismus, no appreciated lymphadenopathy. Intact full range of motion. C-spine midline without vertebral tenderness. Trachea midline.]  CHEST: [Inspection normal, no lesions, equal rise. No crepitus or tenderness upon palpation.]  CARDIOVASCULAR: [Regular rate, rhythm, normal S1 and S2. No appreciated murmurs, rubs, or gallops. No pulse deficits appreciated. Intact distal perfusion. JVD not appreciated.]  PULMONARY: [Respiratory distress absent. Respiratory effort normal. Breath sounds clear to auscultation without rhonchi, rales, or wheezing. No accessory muscle use. No stridor]  ABDOMEN: [Inspection normal, without surgical scars. Soft, non-tender, non-distended, with normoactive bowel sounds. No palpable masses, rebound, or guarding]  BACK: [Intact ROM.  No midline vertebral tenderness, step off, or crepitus. Mild paraspinal tenderness in the lumbar area. No CVA tenderness.]  MUSCULOSKELETAL: [Extremities nontender to palpation. No gross deformity or evidence of external trauma. Intact range of motion. Sensation intact. No clubbing, cyanosis, or edema.]  SKIN: [Warm, dry. No jaundice, rash, urticaria, or petechiae]  NEUROLOGIC: [Alert and oriented x 3, GCS 15, normal mentation for age. Moves all four extremities. Strength 5 out of 5 in the bilateral lower extremities. Sensation intact. Dorsalis pedis pulses are 2+. Patellar and ankle reflexes are 2+. No gross sensory deficit. Cerebellar function grossly normal.]  PSYCHIATRIC: [Normal mood and affect, thought process is clear and linear]     DIFFERENTIAL DIAGNOSIS:   Differential Dx Lists - I consider the following to be a partial list of the possible etiologies for the patient's symptoms and based on my clinical findings as well and are part of my medical decision making:    Back Pain: muscle strain, muscle spasm, contusion, ureteral calculus, Less likely: somatic dysfunction, tumor, referred pain, epidural abscess, and others    DIAGNOSTIC RESULTS       RADIOLOGY: non-plain film images(s) such as CT,Ultrasound and MRI are read by the radiologist.    CT ABDOMEN PELVIS WO CONTRAST Additional Contrast? None   Final Result   Cholelithiasis without evidence of acute cholecystitis. **This report has been created using voice recognition software. It may contain minor errors which are inherent in voice recognition technology. **      Final report electronically signed by Dr Zach Lynn on 8/22/2020 12:46 AM      CT LUMBAR RECONSTRUCTION WO POST PROCESS   Final Result   No acute fracture or subluxation throughout the lumbar spine. **This report has been created using voice recognition software. It may contain minor errors which are inherent in voice recognition technology. **      Final report electronically signed by Dr Terence Dexter on 8/22/2020 12:50 AM        [] Visualized and interpreted by me   [x] Radiologist's Wet Read Report Reviewed   [] Discussed withRadiologist.    LABS:   Labs Reviewed   CBC WITH AUTO DIFFERENTIAL - Abnormal; Notable for the following components:       Result Value    MCHC 30.8 (*)     RDW-CV 14.8 (*)     RDW-SD 45.4 (*)     All other components within normal limits   COMPREHENSIVE METABOLIC PANEL - Abnormal; Notable for the following components:    ALT 10 (*)     All other components within normal limits   OSMOLALITY - Abnormal; Notable for the following components:    Osmolality Calc 268.9 (*)     All other components within normal limits   GLOMERULAR FILTRATION RATE, ESTIMATED - Abnormal; Notable for the following components:    Est, Glom Filt Rate 89 (*)     All other components within normal limits   URINE RT REFLEX TO CULTURE   MAGNESIUM   PHOSPHORUS   ANION GAP       EMERGENCY DEPARTMENT COURSE:   Vitals:    Vitals:    08/21/20 2053 08/21/20 2357   BP: (!) 140/88 136/86   Pulse: 76 70   Resp: 20 18   Temp: 98.9 °F (37.2 °C)    TempSrc: Oral    SpO2: 98% 96%   Weight: 215 lb (97.5 kg)    Height: 4' 11\" (1.499 m)      The results of pertinent diagnostic studies, exam findings and clinical concerns were discussed with the patient. I explained that we do not have a definitive diagnosis for their symptoms at this time. It is possible there is an early developing process that will require recheck and further testing and possible treatment by their primary care physician or specialist. The patient is non-toxic and well appearing in the ED, and there is no focal neurologic deficit on exam. I feel they are appropriate for outpatient follow up with their primary care physician. Prescription medications for symptomatic care will be provided as needed. The patient was instructed to call their PCP in 1-2 days for a follow up appointment.  They are to return to the ED if they experience an increase or change in their symptoms, persistent vomiting, bleeding, fever or inability to take oral fluids. They expressed understanding that follow-up is essential and agreed with this plan and rationale. The patient's and family members present questions were answered and they felt comfortable with discharge. CRITICAL CARE:   none    CONSULTS:  none    PROCEDURES:  None    FINAL IMPRESSION      1. Acute bilateral low back pain without sciatica          DISPOSITION/PLAN   discharge    PATIENT REFERRED TO:  Toña Barton, APRN - CNP  [address]    Schedule an appointment as soon as possible for a visit       Keren Andujar [facility] [address]  [phone]  Schedule an appointment as soon as possible for a visit         DISCHARGE MEDICATIONS:  Discharge Medication List as of 8/22/2020  1:58 AM          (Please note that portions of this note were completed with a voice recognition program.  Efforts were made to edit the dictations but occasionally words are mis-transcribed.)    Provider:  I personally performed the services described in the documentation, reviewed and edited the documentation which was dictated, and it accurately records my words and actions.     Zulay Flaherty MD 8/22/20 2:13 AM                Zulay Flaherty MD  08/22/20 [id number]

## 2020-08-24 ENCOUNTER — TELEPHONE (OUTPATIENT)
Dept: FAMILY MEDICINE CLINIC | Age: 60
End: 2020-08-24

## 2020-08-24 ENCOUNTER — OFFICE VISIT (OUTPATIENT)
Dept: FAMILY MEDICINE CLINIC | Age: 60
End: 2020-08-24
Payer: MEDICARE

## 2020-08-24 VITALS
SYSTOLIC BLOOD PRESSURE: 128 MMHG | OXYGEN SATURATION: 99 % | BODY MASS INDEX: 43.06 KG/M2 | HEIGHT: 59 IN | TEMPERATURE: 97.5 F | DIASTOLIC BLOOD PRESSURE: 70 MMHG | RESPIRATION RATE: 10 BRPM | HEART RATE: 78 BPM | WEIGHT: 213.6 LBS

## 2020-08-24 LAB — GFR SERPL CREATININE-BSD FRML MDRD: 73 ML/MIN/1.73M2

## 2020-08-24 PROCEDURE — 99213 OFFICE O/P EST LOW 20 MIN: CPT | Performed by: STUDENT IN AN ORGANIZED HEALTH CARE EDUCATION/TRAINING PROGRAM

## 2020-08-24 PROCEDURE — G8427 DOCREV CUR MEDS BY ELIG CLIN: HCPCS | Performed by: STUDENT IN AN ORGANIZED HEALTH CARE EDUCATION/TRAINING PROGRAM

## 2020-08-24 PROCEDURE — G8417 CALC BMI ABV UP PARAM F/U: HCPCS | Performed by: STUDENT IN AN ORGANIZED HEALTH CARE EDUCATION/TRAINING PROGRAM

## 2020-08-24 PROCEDURE — 1036F TOBACCO NON-USER: CPT | Performed by: STUDENT IN AN ORGANIZED HEALTH CARE EDUCATION/TRAINING PROGRAM

## 2020-08-24 PROCEDURE — 3017F COLORECTAL CA SCREEN DOC REV: CPT | Performed by: STUDENT IN AN ORGANIZED HEALTH CARE EDUCATION/TRAINING PROGRAM

## 2020-08-24 RX ORDER — NAPROXEN 500 MG/1
500 TABLET ORAL 2 TIMES DAILY WITH MEALS
Qty: 180 TABLET | Refills: 0 | Status: SHIPPED | OUTPATIENT
Start: 2020-08-24 | End: 2020-12-16 | Stop reason: ALTCHOICE

## 2020-08-24 ASSESSMENT — ENCOUNTER SYMPTOMS
ABDOMINAL PAIN: 0
SHORTNESS OF BREATH: 0
TROUBLE SWALLOWING: 0
BOWEL INCONTINENCE: 1
CONSTIPATION: 0
BLOOD IN STOOL: 0
COUGH: 0
EYE PAIN: 0
BACK PAIN: 1
DIARRHEA: 0
VOMITING: 0
NAUSEA: 0

## 2020-08-24 NOTE — TELEPHONE ENCOUNTER
[address] [facility]  [address], [address]  Phone: [phone]  Fax: [phone]    August 24, 2020    [address] [facility] [id number]    Dear Mitchel Noland,    This letter is regarding your Emergency Department (ED) visit at [address] [place] [address] on 8/22/20. Jack Conn wanted to make sure that you understand your discharge instructions and that you were able to fill any prescriptions that may have been ordered for you. Please contact the office at the above phone number if the ED advised you to follow up with Jack Conn, or if you have any further questions or needs. Also did you know -   *Visiting the ED for a non-emergency could result in higher co-pays than you would normally be subject to paying? *You can call your doctor even after hours so they can direct you to the most appropriate care. [facility]) practices can often offer you an appointment on the same day that you call. *We have some [facility] offices that offer Walk-in appointments; check our website for availability in your community, [email or web address].      *Evisits are now available for patients for $36 through [employer] for certain conditions:  * Sinus, cold and or cough       * Diarrhea            * Headache  * Heartburn                                * Poison Deanna          * Back pain     * Urinary problems                         If you do not have [employer] and are interested, please contact the office and a staff member may assist you or go to [email or web address].     Sincerely,   JOANNA Kowalski CNP and your [facility]

## 2020-08-24 NOTE — PROGRESS NOTES
S: 61 y.o. 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 - had to go into a squat position and pulled the patient back up - then had worsening back pain Friday evening - and her back locked up - numbness and tingling in that area as well    The next day more shooting pains int eh bilateral thighs - not going past the knee    The pain is getting worse now - sat and Sunday was ok - getting tight again    BP Readings from Last 3 Encounters:   08/24/20 128/70   08/21/20 136/86   07/26/20 (!) 139/91     Wt Readings from Last 3 Encounters:   08/24/20 213 lb 9.6 oz (96.9 kg)   08/21/20 215 lb (97.5 kg)   07/26/20 210 lb (95.3 kg)           O: VS:   Vitals:    08/24/20 1540   BP: 128/70   Site: Left Upper Arm   Position: Sitting   Cuff Size: Large Adult   Pulse: 78   Resp: 10   Temp: 97.5 °F (36.4 °C)   TempSrc: Temporal   SpO2: 99%   Weight: 213 lb 9.6 oz (96.9 kg)   Height: 4' 11.02\" (1.499 m)     Body mass index is 43.12 kg/m².     AAO/NAD, appropriate affect for mood  Normocephalic, atraumatic, eyes - conjunctiva and sclera normal,   skin no rashes on exposed areas   Insight, judgement normal and in no acute distress    Lumbar spine - paraspinal pint tenderness - muscle train - straight leg raise pain down to the thigh - hanstring was very tight - neg on the left straight leg    Lab Results   Component Value Date    WBC 6.0 08/21/2020    HGB 13.6 08/21/2020    HCT 44.2 08/21/2020     08/21/2020    CHOL 221 (H) 10/24/2019    TRIG 68 10/24/2019    HDL 70 10/24/2019    LDLCALC 137 10/24/2019    AST 16 08/21/2020     08/21/2020    K 3.6 08/21/2020     08/21/2020    CREATININE 0.8 08/21/2020    BUN 9 08/21/2020    CO2 23 08/21/2020    TSH 0.486 10/24/2019    LABGLOM 73 (A) 08/21/2020    MG 2.2 08/21/2020    CALCIUM 9.3 08/21/2020    VITD25 13 (L) 10/24/2019       Ct Abdomen Pelvis Wo Contrast Additional Contrast? None    Result Date: 8/22/2020  PROCEDURE: CT ABDOMEN PELVIS WO CONTRAST CLINICAL INFORMATION: 80-year-old female with lower back pain. Bladder pressure . COMPARISON: None. TECHNIQUE: Axial 5 mm CT images were obtained through the abdomen and pelvis. No contrast was given. Coronal reconstructions were obtained. All CT scans at this facility use dose modulation, iterative reconstruction, and/or weight-based dosing when appropriate to reduce radiation dose to as low as reasonably achievable. FINDINGS: The visualized aspects of the lung bases are clear. The base of the heart is within appropriate limits. Lack of intravenous contrast limits evaluation of the abdominal organs. No liver masses are noted. The spleen is normal. The adrenal glands and pancreas are within acceptable limits. Some calculi are seen within the lumen of the gallbladder. There is no hydronephrosis or stones of either kidney. There is a low-density lesion measuring 11 mm in the left kidney which likely represents a cyst. No abnormalities of the small bowel loops are noted. The IVC and aorta are of normal caliber. There is no adenopathy. The urinary bladder is normal. There is no pelvic free fluid. The colon is grossly normal. There is no adenopathy. The uterus is present. No suspicious osseous lesions are present. Cholelithiasis without evidence of acute cholecystitis. **This report has been created using voice recognition software. It may contain minor errors which are inherent in voice recognition technology. ** Final report electronically signed by Dr Camacho Hernandez on 8/22/2020 12:46 AM    Ct Lumbar Reconstruction Wo Post Process    Result Date: 8/22/2020  PROCEDURE: CT LUMBAR RECONSTRUCTION WO POST PROCESS CLINICAL INFORMATION: 80-year-old female with low back pain. COMPARISON: No prior study. TECHNIQUE: 3 mm axial CT images were reconstructed through the lumbar spine.  These are reconstructed from the patient's abdomen and pelvis CT. IV contrast is not present. Sagittal and coronal reconstructions were obtained. All CT scans at this facility use dose modulation, iterative reconstruction, and/or weight-based dosing when appropriate to reduce radiation dose to as low as reasonably achievable. FINDINGS: The lumbar vertebral bodies are normally aligned. There are no compression fractures. No pars defects are noted. No suspicious osseous lesions are present. The posterior elements are within appropriate limits. No definite disc abnormalities are noted. There are no gross abnormalities within the spinal canal. There are no suspicious findings in the visualized aspects of the retroperitoneum and paraspinal soft tissues. No acute fracture or subluxation throughout the lumbar spine. **This report has been created using voice recognition software. It may contain minor errors which are inherent in voice recognition technology. ** Final report electronically signed by Dr Demetri Marcelo on 8/22/2020 12:50 AM           Diagnosis Orders   1. Acute bilateral low back pain without sciatica  naproxen (NAPROSYN) 500 MG tablet       Plan    May do PT in the future if this continues    Can do aleve or ibuprofen    Stretches    Will have you follow up in a week    Can do some flexeril for a week or so    Can see her back in March for a wellness         Return in about 1 week (around 8/31/2020) for follow up low back pain. Orders Placed:  No orders of the defined types were placed in this encounter. Medications Prescribed:  Orders Placed This Encounter   Medications    naproxen (NAPROSYN) 500 MG tablet     Sig: Take 1 tablet by mouth 2 times daily (with meals)     Dispense:  180 tablet     Refill:  0       No future appointments.     Health Maintenance Due   Topic Date Due    DTaP/Tdap/Td vaccine (1 - Tdap) 08/16/1979    Diabetes screen  08/16/2000    Shingles Vaccine (1 of 2) 08/16/2010  Colon Cancer Screen FIT/FOBT  06/17/2017    Breast cancer screen  11/05/2018         Attending Physician Statement  I have discussed the case, including pertinent history and exam findings with the resident. I also have seen the patient and performed key portions of the examination. I agree with the documented assessment and plan as documented by the resident.   GE modifier added to this encounter      Shine Whiting DO 8/31/2020 6:24 PM

## 2020-08-24 NOTE — LETTER
[facility]  [address], [address]  Phone: [phone]  Fax: [phone]    August 24, 2020    Santosberkleyozzie Ferro  CHELY Feldman [facility] [address] [id number]    Dear Sandra Gutierrez,    This letter is regarding your Emergency Department (ED) visit at [facility] on 8/22/20. Yulia Albert wanted to make sure that you understand your discharge instructions and that you were able to fill any prescriptions that may have been ordered for you. Please contact the office at the above phone number if the ED advised you to follow up with Yulia Albert, or if you have any further questions or needs. Also did you know -   *Visiting the ED for a non-emergency could result in higher co-pays than you would normally be subject to paying? *You can call your doctor even after hours so they can direct you to the most appropriate care. [facility]) practices can often offer you an appointment on the same day that you call. *We have some [facility] offices that offer Walk-in appointments; check our website for availability in your community, [email or web address].      *Evisits are now available for patients for $36 through [employer] for certain conditions:  * Sinus, cold and or cough       * Diarrhea            * Headache  * Heartburn                                * Poison Deanna          * Back pain     * Urinary problems                         If you do not have [employer] and are interested, please contact the office and a staff member may assist you or go to [email or web address].     Sincerely,   JOANNA Pitts CNP and your [facility]

## 2020-08-24 NOTE — PROGRESS NOTES
Alcohol/week: 2.0 standard drinks     Types: 1 Glasses of wine, 1 Cans of beer per week     Comment: occasion      Current Outpatient Medications   Medication Sig Dispense Refill    naproxen (NAPROSYN) 500 MG tablet Take 1 tablet by mouth 2 times daily (with meals) 180 tablet 0    albuterol sulfate HFA (VENTOLIN HFA) 108 (90 Base) MCG/ACT inhaler Inhale 2 puffs into the lungs 4 times daily 1 Inhaler 0    oxybutynin (DITROPAN) 5 MG tablet Take 1 tablet by mouth 2 times daily 60 tablet 5    Blood Pressure Monitoring (BLOOD PRESSURE MONITOR/M CUFF) MISC Check blood pressure 2 times aday (Patient not taking: Reported on 8/24/2020) 1 each 0    Cholecalciferol (VITAMIN D) 50 MCG (2000 UT) TABS tablet Take 2 tablets by mouth daily for a week then starting the second week continue by taking 2 capsules daily (Patient not taking: Reported on 8/24/2020) 180 tablet 3     No current facility-administered medications for this visit. No Known Allergies    Health Maintenance   Topic Date Due    DTaP/Tdap/Td vaccine (1 - Tdap) 08/16/1979    Diabetes screen  08/16/2000    Shingles Vaccine (1 of 2) 08/16/2010    Colon Cancer Screen FIT/FOBT  06/17/2017    Breast cancer screen  11/05/2018    Flu vaccine (1) 09/01/2020    Cervical cancer screen  10/24/2021    Lipid screen  10/24/2024    Hepatitis C screen  Completed    HIV screen  Completed    Hepatitis A vaccine  Aged Out    Hepatitis B vaccine  Aged Out    Hib vaccine  Aged Out    Meningococcal (ACWY) vaccine  Aged Out    Pneumococcal 0-64 years Vaccine  Aged Out       Subjective:      Review of Systems   Constitutional: Negative for chills, fatigue and fever. HENT: Negative for ear pain, postnasal drip and trouble swallowing. Eyes: Negative for pain and visual disturbance. Respiratory: Negative for cough and shortness of breath. Cardiovascular: Negative for chest pain and palpitations.    Gastrointestinal: Positive for bowel incontinence (lactose exhibits tenderness and pain. She exhibits no spasm. Skin:     General: Skin is warm and dry. Findings: No erythema or rash. Neurological:      Mental Status: She is alert and oriented to person, place, and time. Cranial Nerves: No cranial nerve deficit. Psychiatric:         Behavior: Behavior normal.         Thought Content: Thought content normal.         Judgment: Judgment normal.         Lab Results   Component Value Date    WBC 6.0 08/21/2020    HGB 13.6 08/21/2020    HCT 44.2 08/21/2020     08/21/2020    CHOL 221 (H) 10/24/2019    TRIG 68 10/24/2019    HDL 70 10/24/2019    LDLCALC 137 10/24/2019    AST 16 08/21/2020     08/21/2020    K 3.6 08/21/2020     08/21/2020    CREATININE 0.8 08/21/2020    BUN 9 08/21/2020    CO2 23 08/21/2020    TSH 0.486 10/24/2019    LABGLOM 73 (A) 08/21/2020    MG 2.2 08/21/2020    CALCIUM 9.3 08/21/2020    VITD25 13 (L) 10/24/2019       Imaging Results:    Ct Abdomen Pelvis Wo Contrast Additional Contrast? None    Result Date: 8/22/2020  PROCEDURE: CT ABDOMEN PELVIS WO CONTRAST CLINICAL INFORMATION: 51-year-old female with lower back pain. Bladder pressure . COMPARISON: None. TECHNIQUE: Axial 5 mm CT images were obtained through the abdomen and pelvis. No contrast was given. Coronal reconstructions were obtained. All CT scans at this facility use dose modulation, iterative reconstruction, and/or weight-based dosing when appropriate to reduce radiation dose to as low as reasonably achievable. FINDINGS: The visualized aspects of the lung bases are clear. The base of the heart is within appropriate limits. Lack of intravenous contrast limits evaluation of the abdominal organs. No liver masses are noted. The spleen is normal. The adrenal glands and pancreas are within acceptable limits. Some calculi are seen within the lumen of the gallbladder. There is no hydronephrosis or stones of either kidney.  There is a low-density lesion measuring 11 mm in

## 2020-08-24 NOTE — TELEPHONE ENCOUNTER
[facility] [facility]  [address] [address], [address]  Phone: [phone]  Fax: [phone]    August 24, 2020    [address] [id number]    Dear Mis Sanchez,    This letter is regarding your Emergency Department (ED) visit at [address] [address] on 8/22/20. Elvis Stock wanted to make sure that you understand your discharge instructions and that you were able to fill any prescriptions that may have been ordered for you. Please contact the office at the above phone number if the ED advised you to follow up with Elvis Stock, or if you have any further questions or needs. Also did you know -   *Visiting the ED for a non-emergency could result in higher co-pays than you would normally be subject to paying? *You can call your doctor even after hours so they can direct you to the most appropriate care. [facility]) practices can often offer you an appointment on the same day that you call. *We have some [facility] offices that offer Walk-in appointments; check our website for availability in your community, [email or web address].      *Evisits are now available for patients for $36 through [employer] for certain conditions:  * Sinus, cold and or cough       * Diarrhea            * Headache  * Heartburn                                * Poison Deanna          * Back pain     * Urinary problems                         If you do not have [employer] and are interested, please contact the office and a staff member may assist you or go to [email or web address].     Sincerely,   JOANNA Ruth CNP and your [facility]

## 2020-08-24 NOTE — PATIENT INSTRUCTIONS
6. Please note our OFFICE POLICIES:   f. Prior to getting your labs drawn, please check with your insurance company for benefits and eligibility of lab services. Often, insurance companies cover certain tests for preventative visits only. It is patient's responsibility to see what is covered. g. We are unable to change a diagnosis after the test has been performed.   h. Lab orders will not be re-printed. Please hold onto your original lab orders and take them to your lab to be completed. i. If you no show your scheduled appointment three times, you will be dismissed from this practice.   Amado Parrot must be completed 24 hours prior to your schedule appointment. 5. If the list below has been completed, PLEASE FAX RECORDS TO OUR OFFICE @ [phone].  Once the records have been received we will update your records at our office:  Health Maintenance Due   Topic Date Due    DTaP/Tdap/Td vaccine (1 - Tdap) 08/16/1979    Diabetes screen  08/16/2000    Shingles Vaccine (1 of 2) 08/16/2010    Colon Cancer Screen FIT/FOBT  06/17/2017    Breast cancer screen  11/05/2018

## 2020-10-10 ENCOUNTER — HOSPITAL ENCOUNTER (EMERGENCY)
Age: 60
Discharge: HOME | End: 2020-10-10
Attending: EMERGENCY MEDICINE
Payer: MEDICARE

## 2020-10-10 VITALS
TEMPERATURE: 97.9 F | RESPIRATION RATE: 16 BRPM | DIASTOLIC BLOOD PRESSURE: 80 MMHG | OXYGEN SATURATION: 100 % | HEART RATE: 79 BPM | SYSTOLIC BLOOD PRESSURE: 140 MMHG | BODY MASS INDEX: 42.94 KG/M2 | WEIGHT: 213 LBS | HEIGHT: 59 IN

## 2020-10-10 LAB
BACTERIA: ABNORMAL /HPF
BILIRUBIN URINE: NEGATIVE
BLOOD, URINE: ABNORMAL
CASTS UA: ABNORMAL /LPF
CHARACTER, URINE: ABNORMAL
CHLAMYDIA TRACHOMATIS BY RT-PCR: NOT DETECTED
COLOR: ABNORMAL
CRYSTALS, UA: ABNORMAL
CT/NG SOURCE: NORMAL
EPITHELIAL CELLS, UA: ABNORMAL /HPF
GLUCOSE URINE: NEGATIVE MG/DL
KETONES, URINE: NEGATIVE
LEUKOCYTE ESTERASE, URINE: ABNORMAL
NEISSERIA GONORRHOEAE BY RT-PCR: NOT DETECTED
NITRITE, URINE: NEGATIVE
PH UA: 5.5 (ref 5–9)
PREGNANCY, URINE: NEGATIVE
PROTEIN UA: 30
RBC URINE: > 200 /HPF
SPECIFIC GRAVITY, URINE: 1.03 (ref 1–1.03)
TRICHOMONAS PREP: NORMAL
UROBILINOGEN, URINE: 1 EU/DL (ref 0–1)
WBC UA: ABNORMAL /HPF

## 2020-10-10 PROCEDURE — 87491 CHLMYD TRACH DNA AMP PROBE: CPT

## 2020-10-10 PROCEDURE — 87591 N.GONORRHOEAE DNA AMP PROB: CPT

## 2020-10-10 PROCEDURE — 87205 SMEAR GRAM STAIN: CPT

## 2020-10-10 PROCEDURE — 94761 N-INVAS EAR/PLS OXIMETRY MLT: CPT

## 2020-10-10 PROCEDURE — 87086 URINE CULTURE/COLONY COUNT: CPT

## 2020-10-10 PROCEDURE — 87210 SMEAR WET MOUNT SALINE/INK: CPT

## 2020-10-10 PROCEDURE — 99284 EMERGENCY DEPT VISIT MOD MDM: CPT

## 2020-10-10 PROCEDURE — 99283 EMERGENCY DEPT VISIT LOW MDM: CPT

## 2020-10-10 PROCEDURE — 81025 URINE PREGNANCY TEST: CPT

## 2020-10-10 PROCEDURE — 87070 CULTURE OTHR SPECIMN AEROBIC: CPT

## 2020-10-10 PROCEDURE — 87077 CULTURE AEROBIC IDENTIFY: CPT

## 2020-10-10 PROCEDURE — 81001 URINALYSIS AUTO W/SCOPE: CPT

## 2020-10-10 RX ORDER — ORPHENADRINE CITRATE 30 MG/ML
60 INJECTION INTRAMUSCULAR; INTRAVENOUS ONCE
Status: DISCONTINUED | OUTPATIENT
Start: 2020-10-10 | End: 2020-10-10

## 2020-10-10 RX ORDER — LIDOCAINE 4 G/G
1 PATCH TOPICAL DAILY
Status: DISCONTINUED | OUTPATIENT
Start: 2020-10-10 | End: 2020-10-10

## 2020-10-10 ASSESSMENT — PAIN DESCRIPTION - LOCATION
LOCATION: ABDOMEN
LOCATION: ABDOMEN

## 2020-10-10 ASSESSMENT — ENCOUNTER SYMPTOMS
PHOTOPHOBIA: 0
SORE THROAT: 0
SINUS PAIN: 0
ABDOMINAL PAIN: 0
BACK PAIN: 0
ABDOMINAL DISTENTION: 0
WHEEZING: 0
DIARRHEA: 0
CHOKING: 0
TROUBLE SWALLOWING: 0
VOICE CHANGE: 0
COUGH: 0
NAUSEA: 0
VOMITING: 0
SINUS PRESSURE: 0
CONSTIPATION: 0
CHEST TIGHTNESS: 0
SHORTNESS OF BREATH: 0

## 2020-10-10 ASSESSMENT — PAIN DESCRIPTION - ORIENTATION
ORIENTATION: LEFT
ORIENTATION: LEFT

## 2020-10-10 ASSESSMENT — PAIN SCALES - GENERAL
PAINLEVEL_OUTOF10: 6
PAINLEVEL_OUTOF10: 6

## 2020-10-10 ASSESSMENT — PAIN DESCRIPTION - PAIN TYPE
TYPE: ACUTE PAIN
TYPE: ACUTE PAIN

## 2020-10-10 NOTE — ED NOTES
Resident at bedside for eval at this time.  Urine specimen collected and sent to lab     Carmel Hernandez RN  10/10/20 7743

## 2020-10-10 NOTE — ED NOTES
Pt resting on cot with eyes closed. Pt provided with boxed lunch at this time. Denies any other needs. Call light in reach.  Will continue to monitor      Vola Ormond, RN  10/10/20 3593

## 2020-10-10 NOTE — ED PROVIDER NOTES
[address] [address]          Pt Name: Belén Canales  MRN: [id number]  [place] 1960  Date of evaluation: 10/10/2020  Treating Resident Physician: Yvette Munroe MD  Supervising Physician: Dr. Valeriano Castillo       Chief Complaint   Patient presents with    Vaginal Bleeding     History obtained from the patient. HISTORY OF PRESENT ILLNESS    HPI  Belén Canales is a 61 y.o. female who presents to the emergency department for evaluation of vaginal bleeding. Patient states that she has had normal.  Stopped 3 months ago. She noted that over the past couple days she had some spotting. Spotting became worse after sex, accompanied with a little bit of vaginal dryness with sex. Patient also attests to hot flashes. Patient unaware of possibility of menopause. Patient has not seen a gynecologist for several years. The patient has no other acute complaints at this time. REVIEW OF SYSTEMS   Review of Systems   Constitutional: Negative for appetite change, chills, diaphoresis, fatigue, fever and unexpected weight change. HENT: Negative for nosebleeds, sinus pressure, sinus pain, sneezing, sore throat, trouble swallowing and voice change. Eyes: Negative for photophobia and visual disturbance. Respiratory: Negative for cough, choking, chest tightness, shortness of breath and wheezing. Cardiovascular: Negative for chest pain, palpitations and leg swelling. Gastrointestinal: Negative for abdominal distention, abdominal pain, constipation, diarrhea, nausea and vomiting. Genitourinary: Positive for dyspareunia, menstrual problem, vaginal bleeding and vaginal pain. Negative for decreased urine volume, difficulty urinating, dysuria, enuresis, flank pain, frequency, hematuria, pelvic pain and urgency. Musculoskeletal: Negative for back pain.    Neurological: Negative for tremors, seizures, weakness, light-headedness, numbness and headaches. Hematological: Does not bruise/bleed easily. PAST MEDICAL AND SURGICAL HISTORY     Past Medical History:   Diagnosis Date    Anemia     Infertility female     Obesity     Pap smear, as part of routine gynecological examination yrs    Visit for screening mammogram yrs     Past Surgical History:   Procedure Laterality Date    ECTOPIC PREGNANCY SURGERY           MEDICATIONS   No current facility-administered medications for this encounter. Current Outpatient Medications:     naproxen (NAPROSYN) 500 MG tablet, Take 1 tablet by mouth 2 times daily (with meals), Disp: 180 tablet, Rfl: 0    Blood Pressure Monitoring (BLOOD PRESSURE MONITOR/M CUFF) MISC, Check blood pressure 2 times aday (Patient not taking: Reported on 2020), Disp: 1 each, Rfl: 0    albuterol sulfate HFA (VENTOLIN HFA) 108 (90 Base) MCG/ACT inhaler, Inhale 2 puffs into the lungs 4 times daily, Disp: 1 Inhaler, Rfl: 0    oxybutynin (DITROPAN) 5 MG tablet, Take 1 tablet by mouth 2 times daily, Disp: 60 tablet, Rfl: 5    Cholecalciferol (VITAMIN D) 50 MCG (2000 UT) TABS tablet, Take 2 tablets by mouth daily for a week then starting the second week continue by taking 2 capsules daily (Patient not taking: Reported on 2020), Disp: 180 tablet, Rfl: 3      SOCIAL HISTORY     Social History     Social History Narrative    Not on file     Social History     Tobacco Use    Smoking status: Former Smoker     Packs/day: 0.00     Years: 5.00     Pack years: 0.00     Types: Cigars     Last attempt to quit: 2011     Years since quittin.4    Smokeless tobacco: Never Used   Substance Use Topics    Alcohol use:  Yes     Alcohol/week: 2.0 standard drinks     Types: 1 Glasses of wine, 1 Cans of beer per week     Comment: occasion    Drug use: No         ALLERGIES   No Known Allergies      FAMILY HISTORY     Family History   Problem Relation Age of Onset    Heart Disease Mother     Cancer Father pancreatic    Cancer Brother         stomach    Glaucoma Neg Hx          PREVIOUS RECORDS   Previous records reviewed: Past medical, past surgical, medications, allergies. PHYSICAL EXAM     ED Triage Vitals [10/10/20 1243]   BP Temp Temp Source Pulse Resp SpO2 Height Weight   (!) 179/110 97.9 °F (36.6 °C) Oral 76 16 99 % 4' 11\" (1.499 m) 213 lb (96.6 kg)     Initial vital signs and nursing assessment reviewed and normal. Pulsoximetry is normal per my interpretation. Additional Vital Signs:  Vitals:    10/10/20 1616   BP: (!) 140/80   Pulse: 79   Resp: 16   Temp:    SpO2: 100%       Physical Exam  Exam conducted with a chaperone present. Constitutional:       General: She is not in acute distress. Appearance: Normal appearance. She is not ill-appearing, toxic-appearing or diaphoretic. HENT:      Head: Normocephalic and atraumatic. Right Ear: External ear normal.      Left Ear: External ear normal.      Nose: Nose normal.      Mouth/Throat:      Mouth: Mucous membranes are moist.      Pharynx: Oropharynx is clear. Eyes:      Conjunctiva/sclera: Conjunctivae normal.   Neck:      Musculoskeletal: Normal range of motion and neck supple. Cardiovascular:      Rate and Rhythm: Normal rate and regular rhythm. Pulses: Normal pulses. Heart sounds: Normal heart sounds. No murmur. No gallop. Pulmonary:      Effort: Pulmonary effort is normal. No respiratory distress. Breath sounds: Normal breath sounds. No stridor. No wheezing, rhonchi or rales. Abdominal:      General: Bowel sounds are normal. There is no distension. Palpations: Abdomen is soft. There is no mass. Tenderness: There is no abdominal tenderness. There is no right CVA tenderness, left CVA tenderness, guarding or rebound. Hernia: No hernia is present. Genitourinary:     General: Normal vulva. Pubic Area: No rash or pubic lice. Labia:         Right: No rash, tenderness, lesion or injury. Left: No rash, tenderness, lesion or injury. Cervix: Cervical bleeding present. Uterus: Normal.       Adnexa: Right adnexa normal and left adnexa normal.         Neurological:      Mental Status: She is alert. MEDICAL DECISION MAKING   Initial Assessment: 80-year-old female, presenting with vaginal bleeding, vaginal dryness, pain with sex. Pelvic exam shows small amount of blood from office, no discharge, no lesions, no adnexal tenderness. Likely is perimenopausal spotting. Plan: Discussed menopausal symptoms with patient, recommended follow-up with Dr. Addison Zapata. Discharge to home. ED RESULTS   Laboratory results:  Labs Reviewed   URINE WITH REFLEXED MICRO - Abnormal; Notable for the following components:       Result Value    Blood, Urine LARGE (*)     Protein, UA 30 (*)     Leukocyte Esterase, Urine MODERATE (*)     Color, UA RED (*)     Character, Urine TURBID (*)     All other components within normal limits   WET PREP, GENITAL    Narrative:     Source: vaginal non-OB patient       Site: swab          Current Antibiotics: not stated   C. TRACHOMATIS / N. GONORRHOEAE, DNA   CULTURE, GENITAL   CULTURE, REFLEXED, URINE    Narrative:     Source: urine, clean catch       Site:           Current Antibiotics: not stated   PREGNANCY, URINE       Radiologic studies results:  No orders to display       ED Medications administered this visit: Medications - No data to display      ED COURSE        Strict return precautions and follow up instructions were discussed with the patient prior to discharge, with which the patient agrees. MEDICATION CHANGES     Discharge Medication List as of 10/10/2020  4:42 PM            FINAL DISPOSITION     Final diagnoses:   Vaginal bleeding     Condition: condition: good  Dispo: Discharge to home      This transcription was electronically signed.  Parts of this transcriptions may have been dictated by use of voice recognition software and electronically transcribed, and parts may have been transcribed with the assistance of an ED scribe. The transcription may contain errors not detected in proofreading. Please refer to my supervising physician's documentation if my documentation differs.     Electronically Signed: Freedom Rodriguez, 10/10/20, 9:15 PM       Freedom Rodriguez MD  Resident  10/10/20 0836

## 2020-10-10 NOTE — ED NOTES
Pt resting on cot awaiting re-eval by provider. Vitals stable. Call light in reach.  Will continue to monitor      Jarrod Suarez RN  10/10/20 3088

## 2020-10-10 NOTE — ED TRIAGE NOTES
Pt presents to the ED through triage with c/o vaginal bleeding. Pt states she noticed some spotting for about 2 weeks. Pt states she had sex last night and the bleeding became much worse. States she has been having regular periods up to 3-4 months ago until this situation. Pt states she is also having left sided abdominal pain, 6/10 at this time. Vitals stable. Resp even and unlabored.

## 2020-10-12 ENCOUNTER — TELEPHONE (OUTPATIENT)
Dept: FAMILY MEDICINE CLINIC | Age: 60
End: 2020-10-12

## 2020-10-12 LAB
ORGANISM: ABNORMAL
URINE CULTURE REFLEX: ABNORMAL

## 2020-10-12 NOTE — LETTER
[facility]  [address] [address], [address]  Phone: [phone]  Fax: [phone]    October 12, 2020    [address] [id number]    Dear Lalitha Vigil,    This letter is regarding your Emergency Department (ED) visit at [address] [address] on 10/10/20. Taty Villagomez wanted to make sure that you understand your discharge instructions and that you were able to fill any prescriptions that may have been ordered for you. Please contact the office at the above phone number if the ED advised you to follow up with Taty Villagomez, or if you have any further questions or needs. Also did you know -   *Visiting the ED for a non-emergency could result in higher co-pays than you would normally be subject to paying? *You can call your doctor even after hours so they can direct you to the most appropriate care. [facility]) practices can often offer you an appointment on the same day that you call. *We have some [facility] offices that offer Walk-in appointments; check our website for availability in your community, [email or web address].      *Evisits are now available for patients for $36 through [employer] for certain conditions:  * Sinus, cold and or cough       * Diarrhea            * Headache  * Heartburn                                * Poison Deanna          * Back pain     * Urinary problems                         If you do not have [employer] and are interested, please contact the office and a staff member may assist you or go to [email or web address].     Sincerely,   JOANNA Almanza CNP and your [facility]

## 2020-10-13 LAB
GENITAL CULTURE, ROUTINE: NORMAL
GRAM STAIN RESULT: NORMAL

## 2020-10-13 NOTE — PROGRESS NOTES
Pharmacy Note  ED Culture Follow-up    Nano Lucas is a 61 y.o. female. Allergies: Patient has no known allergies. Labs:  Lab Results   Component Value Date    BUN 9 08/21/2020    CREATININE 0.8 08/21/2020    WBC 6.0 08/21/2020     CrCl cannot be calculated (Patient's most recent lab result is older than the maximum 10 days allowed. ). Current antimicrobials:   none    ASSESSMENT:  Micro results:   Urine culture: positive for GBS     PLAN:  Need for intervention NO  Discussed with: Russell Michael CNP  Chosen treatment:    Patient already on appropriate treatment as above    Patient response:   No need to contact patient    Called/sent in prescription to: Not applicable    Please call with any questions.  Alexandru Christina, PharmD 5:09 PM 10/13/2020

## 2020-11-03 ENCOUNTER — OFFICE VISIT (OUTPATIENT)
Dept: FAMILY MEDICINE CLINIC | Age: 60
End: 2020-11-03
Payer: MEDICARE

## 2020-11-03 ENCOUNTER — HOSPITAL ENCOUNTER (OUTPATIENT)
Age: 60
Discharge: HOME | End: 2020-11-03
Payer: MEDICARE

## 2020-11-03 VITALS
OXYGEN SATURATION: 98 % | SYSTOLIC BLOOD PRESSURE: 142 MMHG | RESPIRATION RATE: 16 BRPM | HEART RATE: 76 BPM | BODY MASS INDEX: 44.23 KG/M2 | WEIGHT: 219 LBS | DIASTOLIC BLOOD PRESSURE: 88 MMHG | TEMPERATURE: 97.9 F

## 2020-11-03 LAB
ANION GAP SERPL CALCULATED.3IONS-SCNC: 11 MEQ/L (ref 8–16)
BUN BLDV-MCNC: 11 MG/DL (ref 7–22)
CALCIUM SERPL-MCNC: 9.6 MG/DL (ref 8.5–10.5)
CHLORIDE BLD-SCNC: 105 MEQ/L (ref 98–111)
CO2: 26 MEQ/L (ref 23–33)
CREAT SERPL-MCNC: 0.6 MG/DL (ref 0.4–1.2)
GFR SERPL CREATININE-BSD FRML MDRD: > 90 ML/MIN/1.73M2
GLUCOSE BLD-MCNC: 96 MG/DL (ref 70–108)
HOMOCYSTEINE: 12.7 UMOL/L
MAGNESIUM: 2.1 MG/DL (ref 1.6–2.4)
POTASSIUM SERPL-SCNC: 3.6 MEQ/L (ref 3.5–5.2)
SODIUM BLD-SCNC: 142 MEQ/L (ref 135–145)
VITAMIN D 25-HYDROXY: 26 NG/ML (ref 30–100)

## 2020-11-03 PROCEDURE — 82306 VITAMIN D 25 HYDROXY: CPT

## 2020-11-03 PROCEDURE — G8427 DOCREV CUR MEDS BY ELIG CLIN: HCPCS | Performed by: STUDENT IN AN ORGANIZED HEALTH CARE EDUCATION/TRAINING PROGRAM

## 2020-11-03 PROCEDURE — 3017F COLORECTAL CA SCREEN DOC REV: CPT | Performed by: STUDENT IN AN ORGANIZED HEALTH CARE EDUCATION/TRAINING PROGRAM

## 2020-11-03 PROCEDURE — G8484 FLU IMMUNIZE NO ADMIN: HCPCS | Performed by: STUDENT IN AN ORGANIZED HEALTH CARE EDUCATION/TRAINING PROGRAM

## 2020-11-03 PROCEDURE — 36415 COLL VENOUS BLD VENIPUNCTURE: CPT

## 2020-11-03 PROCEDURE — 83090 ASSAY OF HOMOCYSTEINE: CPT

## 2020-11-03 PROCEDURE — 1036F TOBACCO NON-USER: CPT | Performed by: STUDENT IN AN ORGANIZED HEALTH CARE EDUCATION/TRAINING PROGRAM

## 2020-11-03 PROCEDURE — 99213 OFFICE O/P EST LOW 20 MIN: CPT | Performed by: STUDENT IN AN ORGANIZED HEALTH CARE EDUCATION/TRAINING PROGRAM

## 2020-11-03 PROCEDURE — G8417 CALC BMI ABV UP PARAM F/U: HCPCS | Performed by: STUDENT IN AN ORGANIZED HEALTH CARE EDUCATION/TRAINING PROGRAM

## 2020-11-03 PROCEDURE — 83735 ASSAY OF MAGNESIUM: CPT

## 2020-11-03 PROCEDURE — 80048 BASIC METABOLIC PNL TOTAL CA: CPT

## 2020-11-03 ASSESSMENT — ENCOUNTER SYMPTOMS
NAUSEA: 0
SHORTNESS OF BREATH: 0
TROUBLE SWALLOWING: 0
ABDOMINAL PAIN: 0
VOMITING: 0
CONSTIPATION: 0
COUGH: 0
DIARRHEA: 0
BLOOD IN STOOL: 0
EYE PAIN: 0

## 2020-11-03 NOTE — PROGRESS NOTES
Health Maintenance Due   Topic Date Due    DTaP/Tdap/Td vaccine (1 - Tdap) 08/16/1979 patient does not want vaccine     Shingles Vaccine (1 of 2) 08/16/2010patient does not want vaccine    Colon Cancer Screen FIT/FOBT  06/17/2017 patient has not had done.  Breast cancer screen  11/05/2018 patient unsure when last mammogram was completed.      Flu vaccine (1) 09/01/2020 patient does not want vaccine

## 2020-11-03 NOTE — PROGRESS NOTES
[id number] [facility] [address] [place] [id number]  Dept: [phone]  Dept Fax: [phone]  Loc: [address] [place] [address] Paula Chen is a 61 y.o. female who presents today for:  Chief Complaint   Patient presents with    Gynecologic Exam       Goals      Exercise 3x per week (30 min per time)           HPI:     APOLINAR Blue is a 61 y.o. female for   Chief Complaint   Patient presents with    Gynecologic Exam       Complains of bloody vaginal discharge for 1 month(s). Currently sexually active? Yes - new partner  Current number of sexual partners? 1  Recent STD exposure? No  History of sexually transmitted disease? Yes - syphilis Treated? yes  LMP? No LMP recorded. Patient is perimenopausal.    General ROS: positive for  - fatigue and hot flashes  negative for - chills, fever, night sweats or weight loss  Genito-Urinary ROS: positive for - change in menstrual cycle, dyspareunia and vaginal dryness  negative for - genital discharge, genital ulcers or pelvic pain    Health Habits:    Pregnant at 15 yo and again in . Periods around 16-14 years old. She  reports that she quit smoking about 9 years ago. Her smoking use included cigars. She smoked 0.00 packs per day for 5.00 years. She has never used smokeless tobacco. She reports current alcohol use of about 2.0 standard drinks of alcohol per week. She reports that she does not use drugs. .  Her last pap smear was 4  years and it was normal. Her last menstrual cycle was 1months ago. She is sexually active. She has had the same sexual partner for the last 2 months. She does not perform regular breast self exams.            Past Medical History:   Diagnosis Date    Anemia     Infertility female     Obesity     Pap smear, as part of routine gynecological examination yrs    Visit for screening mammogram yrs      Past Surgical History:   Procedure Laterality Date  ECTOPIC PREGNANCY SURGERY       Family History   Problem Relation Age of Onset    Heart Disease Mother     Cancer Father         pancreatic    Cancer Brother         stomach    Glaucoma Neg Hx      Social History     Tobacco Use    Smoking status: Former Smoker     Packs/day: 0.00     Years: 5.00     Pack years: 0.00     Types: Cigars     Last attempt to quit: 2011     Years since quittin.5    Smokeless tobacco: Never Used   Substance Use Topics    Alcohol use: Yes     Alcohol/week: 2.0 standard drinks     Types: 1 Glasses of wine, 1 Cans of beer per week     Comment: occasion      Current Outpatient Medications   Medication Sig Dispense Refill    albuterol sulfate HFA (VENTOLIN HFA) 108 (90 Base) MCG/ACT inhaler Inhale 2 puffs into the lungs 4 times daily 1 Inhaler 0    oxybutynin (DITROPAN) 5 MG tablet Take 1 tablet by mouth 2 times daily 60 tablet 5    naproxen (NAPROSYN) 500 MG tablet Take 1 tablet by mouth 2 times daily (with meals) (Patient not taking: Reported on 11/3/2020) 180 tablet 0     No current facility-administered medications for this visit. No Known Allergies    Health Maintenance   Topic Date Due    Colon Cancer Screen FIT/FOBT  2017    Breast cancer screen  2018    DTaP/Tdap/Td vaccine (1 - Tdap) 2021 (Originally 1979)    Flu vaccine (1) 2021 (Originally 2020)    Shingles Vaccine (1 of 2) 2021 (Originally 2010)    Cervical cancer screen  10/24/2021    Lipid screen  10/24/2024    Hepatitis C screen  Completed    HIV screen  Completed    Hepatitis A vaccine  Aged Out    Hepatitis B vaccine  Aged Out    Hib vaccine  Aged Out    Meningococcal (ACWY) vaccine  Aged Out    Pneumococcal 0-64 years Vaccine  Aged Out       Subjective:      Review of Systems   Constitutional: Negative for chills, fatigue and fever. HENT: Negative for ear pain, postnasal drip and trouble swallowing.     Eyes: Negative for pain and visual disturbance. Respiratory: Negative for cough and shortness of breath. Cardiovascular: Negative for chest pain and palpitations. Gastrointestinal: Negative for abdominal pain, blood in stool, constipation, diarrhea, nausea and vomiting. Genitourinary: Positive for dyspareunia and vaginal bleeding. Negative for dysuria, genital sores, hematuria, pelvic pain and urgency. Skin: Negative for rash and wound. Neurological: Negative for dizziness and headaches. Psychiatric/Behavioral: Negative for dysphoric mood. The patient is not nervous/anxious. Objective:     Vitals:    11/03/20 1419 11/03/20 1421   BP: (!) 144/86 (!) 142/88   Site: Left Upper Arm Right Upper Arm   Pulse: 76    Resp: 16    Temp: 97.9 °F (36.6 °C)    TempSrc: Temporal    SpO2: 98%    Weight: 219 lb (99.3 kg)        Body mass index is 44.23 kg/m². Wt Readings from Last 3 Encounters:   11/03/20 219 lb (99.3 kg)   10/10/20 213 lb (96.6 kg)   08/24/20 213 lb 9.6 oz (96.9 kg)     BP Readings from Last 3 Encounters:   11/03/20 (!) 142/88   10/10/20 (!) 140/80   08/24/20 128/70       Physical Exam  Constitutional:       Appearance: She is well-developed. HENT:      Head: Normocephalic and atraumatic. Right Ear: External ear normal.      Left Ear: External ear normal.      Nose: Nose normal.   Eyes:      Conjunctiva/sclera: Conjunctivae normal.   Neck:      Musculoskeletal: Normal range of motion. Cardiovascular:      Rate and Rhythm: Normal rate and regular rhythm. Heart sounds: No murmur. Pulmonary:      Effort: Pulmonary effort is normal.      Breath sounds: Normal breath sounds. Abdominal:      General: Bowel sounds are normal.      Palpations: Abdomen is soft. Genitourinary:     Exam position: Supine. Labia:         Right: No rash, lesion or injury. Left: No rash, lesion or injury. Vagina: Normal. No vaginal discharge or bleeding. Cervix: Cervical bleeding present.  No discharge, friability or erythema. Uterus: Normal. Not tender. Adnexa: Right adnexa normal and left adnexa normal.   Musculoskeletal: Normal range of motion. General: No tenderness. Lymphadenopathy:      Lower Body: No right inguinal adenopathy. No left inguinal adenopathy. Skin:     General: Skin is warm and dry. Neurological:      Mental Status: She is alert and oriented to person, place, and time. Psychiatric:         Behavior: Behavior normal.         Thought Content: Thought content normal.         Judgment: Judgment normal.         Lab Results   Component Value Date    WBC 6.0 08/21/2020    HGB 13.6 08/21/2020    HCT 44.2 08/21/2020     08/21/2020    CHOL 221 (H) 10/24/2019    TRIG 68 10/24/2019    HDL 70 10/24/2019    LDLCALC 137 10/24/2019    AST 16 08/21/2020     11/03/2020    K 3.6 11/03/2020     11/03/2020    CREATININE 0.6 11/03/2020    BUN 11 11/03/2020    CO2 26 11/03/2020    TSH 0.486 10/24/2019    LABGLOM >90 11/03/2020    MG 2.1 11/03/2020    CALCIUM 9.6 11/03/2020    VITD25 13 (L) 10/24/2019       Imaging Results:    No results found. Assessment/Plan:     Lalitha Vigil was seen today for gynecologic exam.    Diagnoses and all orders for this visit:    Abnormal uterine bleeding  -Likely perimenopausal bleeding however can not rule out further pathology at this time.   -Consider uterine US vs Endometrial biopsy  -At this time will start with pregnancy test and pap smear  -Possible referral to OB/GYN if indicated. -     Pregnancy, Urine; Future  -     PAP SMEAR    BMI 40.0-44.9, adult (HCC)  -Consuelled on diet and exercise  -Recommend plant based whole food diet with moderate intensity exercise for at least 150 min a week. Screening for cervical cancer  -     PAP SMEAR         No follow-ups on file. Medications Prescribed:  No orders of the defined types were placed in this encounter. No future appointments.     Patient given educational materials - see patient instructions. Discussed use, benefit, and sideeffects of prescribed medications. All patient questions answered. Pt voiced understanding. Reviewed health maintenance. Instructed to continue current medications, diet and exercise. Patient agreed with treatment plan. Follow up as directed.      Electronically signed by Marcial Tan DO on 11/3/2020 at 4:13 PM

## 2020-11-03 NOTE — PROGRESS NOTES
S: 61 y.o. female with   Chief Complaint   Patient presents with    Gynecologic Exam       HPI: vaginal bleeding started 1-2 wks ago. Referred here for exam.  Spotting x 1 month. LMP was 1 yr ago. Hot flashes within the past year.  + vaginal dryness. Periods have been regular since teenager. This is unusual for her. Last pap 4 yrs ago normal.  Cannot remember if she has had an abnormal pap in the past.  New sexual partner over the past 2 months. No STD symptoms. BP Readings from Last 3 Encounters:   11/03/20 (!) 142/88   10/10/20 (!) 140/80   08/24/20 128/70     Wt Readings from Last 3 Encounters:   11/03/20 219 lb (99.3 kg)   10/10/20 213 lb (96.6 kg)   08/24/20 213 lb 9.6 oz (96.9 kg)           O: VS:  weight is 219 lb (99.3 kg). Her temporal temperature is 97.9 °F (36.6 °C). Her blood pressure is 142/88 (abnormal) and her pulse is 76. Her respiration is 16 and oxygen saturation is 98%. Diagnosis Orders   1. Abnormal uterine bleeding  Pregnancy, Urine    PAP SMEAR   2. BMI 40.0-44.9, adult ([facility] [address]     3. Screening for cervical cancer  PAP SMEAR       Plan:  Pap today. UPT ordered. Seems consistent with normal menopause but at a later age than i'm comfortable with without an endometrial bx. Send to Gyn. Health Maintenance Due   Topic Date Due    Colon Cancer Screen FIT/FOBT  06/17/2017    Breast cancer screen  11/05/2018         Attending Physician Statement  I have discussed the case, including pertinent history and exam findings with the resident. I agree with the documented assessment and plan as documented by the resident.         Bruna Mosqueda DO 11/3/2020 3:16 PM

## 2020-11-05 ENCOUNTER — HOSPITAL ENCOUNTER (OUTPATIENT)
Age: 60
Discharge: HOME | End: 2020-11-05
Payer: MEDICARE

## 2020-11-05 LAB — PREGNANCY, URINE: NEGATIVE

## 2020-11-05 PROCEDURE — 81025 URINE PREGNANCY TEST: CPT

## 2020-11-10 ENCOUNTER — TELEPHONE (OUTPATIENT)
Dept: FAMILY MEDICINE CLINIC | Age: 60
End: 2020-11-10

## 2020-11-11 NOTE — TELEPHONE ENCOUNTER
Pregnancy tests were negative. Her PAP Smear is still pending. Will let her know the final results when we get it. Will send COVID-19 order in for patient if she begins to be symptomatic however I do not feel that it is required right now.

## 2020-11-12 LAB — CYTOLOGY THIN PREP PAP: NORMAL

## 2020-11-16 ENCOUNTER — TELEPHONE (OUTPATIENT)
Dept: INTERNAL MEDICINE CLINIC | Age: 60
End: 2020-11-16

## 2020-11-16 NOTE — TELEPHONE ENCOUNTER
----- Message from Herberth Johnson DO sent at 11/16/2020  9:49 AM EST -----  Can we have her come in to discuss her test results.

## 2020-11-16 NOTE — TELEPHONE ENCOUNTER
Called pt and unable to leave a message due to her VM being full.  Will try and call again at a later time

## 2020-11-17 ENCOUNTER — VIRTUAL VISIT (OUTPATIENT)
Dept: FAMILY MEDICINE CLINIC | Age: 60
End: 2020-11-17
Payer: MEDICARE

## 2020-11-17 PROCEDURE — G2012 BRIEF CHECK IN BY MD/QHP: HCPCS | Performed by: FAMILY MEDICINE

## 2020-11-17 NOTE — PROGRESS NOTES
This was an audio visit with the patient at their home and the resident and myself in the continuity clinic. S: 61 y.o. female with   Chief Complaint   Patient presents with    Fever       Patient calls with concerns of fever, fatigue, mild cough and says she doesn't feel well at all. She did have a covid contact. She states she is not sure if she is able to go to drive through to get tested. She is stating that she feels ill like she should go to ER but doesn't want to expose anyone to the virus if she has it. Also, she had abnormal pap and needs referral to gyn. Agrees to go. BP Readings from Last 3 Encounters:   11/03/20 (!) 142/88   10/10/20 (!) 140/80   08/24/20 128/70     Wt Readings from Last 3 Encounters:   11/03/20 219 lb (99.3 kg)   10/10/20 213 lb (96.6 kg)   08/24/20 213 lb 9.6 oz (96.9 kg)         Lab Results   Component Value Date    WBC 6.0 08/21/2020    HGB 13.6 08/21/2020    HCT 44.2 08/21/2020     08/21/2020    CHOL 221 (H) 10/24/2019    TRIG 68 10/24/2019    HDL 70 10/24/2019    LDLCALC 137 10/24/2019    AST 16 08/21/2020     11/03/2020    K 3.6 11/03/2020     11/03/2020    CREATININE 0.6 11/03/2020    BUN 11 11/03/2020    CO2 26 11/03/2020    TSH 0.486 10/24/2019    LABGLOM >90 11/03/2020    MG 2.1 11/03/2020    CALCIUM 9.6 11/03/2020    VITD25 26 (L) 11/03/2020       No results found. Diagnosis Orders   1. Viral URI  COVID-19 Ambulatory   2. Other abnormal cytological finding of specimen from cervix  Filomena Hammans, MD, Obstetrics & Gynecology, [facility]  covid test ordered. Advised that if feeling extremely ill, or Struggling to breath, then she should call 911. She is referred to gyn for abnormal pap       No follow-ups on file.     Orders Placed:  Orders Placed This Encounter   Procedures    COVID-19 Ambulatory   Juno Ortez MD, Obstetrics & Gynecology, [facility]     Medications Prescribed:  No orders of the defined types were placed in this encounter. No future appointments. Health Maintenance Due   Topic Date Due    Colon Cancer Screen FIT/FOBT  06/17/2017    Breast cancer screen  11/05/2018         Attending Physician Statement  I have discussed the case, including pertinent history and exam findings with the resident. I agree with the documented assessment and plan as documented by the resident.   GE modifier added to this encounter      Myrtle Rollins MD 11/17/2020 5:07 PM

## 2020-11-17 NOTE — TELEPHONE ENCOUNTER
Attempted to contact pt, voicemail box is full and unable to leave message. Pt left message on voicemail stating she is having COVID symptoms. Please schedule pt same day virtual visit with  [address]

## 2020-11-17 NOTE — PROGRESS NOTES
Arlin Oleary is a 61 y.o. female evaluated via telephone on 11/17/2020. Consent:  She and/or health care decision maker is aware that that she may receive a bill for this telephone service, depending on her insurance coverage, and has provided verbal consent to proceed: Yes      Documentation:  I communicated with the patient and/or health care decision maker about recent COVID 19 exposure with new onset symptoms. Patient states that she recently started having fevers last evening and some mild cough. She states that she would like to be tested for COVID-19 at this time but is concerned about getting on the bus when she is having these symptoms. She states that she is worried very much about her symptoms and states that she wants to be seen in the hospital and wants them to come pick her up. At this time discussed with patient that if she feels sick enough that she needs to call 911 to be evaluated in the Emergency room. Patient is unsure at this time if she wants to do this. Once again reiterated that if she is having worsening symptoms including worsening shortness of breath to call 911 and be evaluated further. Patient voiced understanding. Discussed with pateint that due to abnormal PAP smear results she will need seen at OB/GYN for possible colposcopy. Referral ordered. Details of this discussion including any medical advice provided: Refer to Ob/GYN for possible colposcopy. COVID-19 swab ordered. I affirm this is a Patient Initiated Episode with a Patient who has not had a related appointment within my department in the past 7 days or scheduled within the next 24 hours.     Patient identification was verified at the start of the visit: Yes    Total Time: minutes: 11-20 minutes    Note: not billable if this call serves to triage the patient into an appointment for the relevant concern      Radha Arreguin

## 2020-11-18 ENCOUNTER — APPOINTMENT (OUTPATIENT)
Dept: GENERAL RADIOLOGY | Age: 60
End: 2020-11-18
Payer: MEDICARE

## 2020-11-18 ENCOUNTER — HOSPITAL ENCOUNTER (EMERGENCY)
Age: 60
Discharge: HOME | End: 2020-11-18
Attending: EMERGENCY MEDICINE
Payer: MEDICARE

## 2020-11-18 VITALS
RESPIRATION RATE: 20 BRPM | TEMPERATURE: 99.2 F | SYSTOLIC BLOOD PRESSURE: 105 MMHG | OXYGEN SATURATION: 100 % | DIASTOLIC BLOOD PRESSURE: 64 MMHG | HEART RATE: 68 BPM

## 2020-11-18 LAB
ALBUMIN SERPL-MCNC: 3.3 G/DL (ref 3.5–5.1)
ALP BLD-CCNC: 86 U/L (ref 38–126)
ALT SERPL-CCNC: 16 U/L (ref 11–66)
ANION GAP SERPL CALCULATED.3IONS-SCNC: 9 MEQ/L (ref 8–16)
AST SERPL-CCNC: 34 U/L (ref 5–40)
ATYPICAL LYMPHOCYTES: ABNORMAL %
BASOPHILS # BLD: 0.2 %
BASOPHILS ABSOLUTE: 0 THOU/MM3 (ref 0–0.1)
BILIRUB SERPL-MCNC: 0.4 MG/DL (ref 0.3–1.2)
BUN BLDV-MCNC: 8 MG/DL (ref 7–22)
CALCIUM SERPL-MCNC: 8.9 MG/DL (ref 8.5–10.5)
CHLORIDE BLD-SCNC: 101 MEQ/L (ref 98–111)
CO2: 25 MEQ/L (ref 23–33)
CREAT SERPL-MCNC: 0.8 MG/DL (ref 0.4–1.2)
EKG ATRIAL RATE: 66 BPM
EKG P AXIS: 36 DEGREES
EKG P-R INTERVAL: 138 MS
EKG Q-T INTERVAL: 396 MS
EKG QRS DURATION: 84 MS
EKG QTC CALCULATION (BAZETT): 415 MS
EKG R AXIS: -24 DEGREES
EKG T AXIS: -15 DEGREES
EKG VENTRICULAR RATE: 66 BPM
EOSINOPHIL # BLD: 0.2 %
EOSINOPHILS ABSOLUTE: 0 THOU/MM3 (ref 0–0.4)
ERYTHROCYTE [DISTWIDTH] IN BLOOD BY AUTOMATED COUNT: 14.6 % (ref 11.5–14.5)
ERYTHROCYTE [DISTWIDTH] IN BLOOD BY AUTOMATED COUNT: 43.7 FL (ref 35–45)
GLUCOSE BLD-MCNC: 107 MG/DL (ref 70–108)
HCT VFR BLD CALC: 45.3 % (ref 37–47)
HEMOGLOBIN: 14.5 GM/DL (ref 12–16)
IMMATURE GRANS (ABS): 0.01 THOU/MM3 (ref 0–0.07)
IMMATURE GRANULOCYTES: 0.2 %
LYMPHOCYTES # BLD: 18.7 %
LYMPHOCYTES ABSOLUTE: 1 THOU/MM3 (ref 1–4.8)
MAGNESIUM: 2 MG/DL (ref 1.6–2.4)
MCH RBC QN AUTO: 26.4 PG (ref 26–33)
MCHC RBC AUTO-ENTMCNC: 32 GM/DL (ref 32.2–35.5)
MCV RBC AUTO: 82.5 FL (ref 81–99)
MONOCYTES # BLD: 8.2 %
MONOCYTES ABSOLUTE: 0.5 THOU/MM3 (ref 0.4–1.3)
NUCLEATED RED BLOOD CELLS: 0 /100 WBC
OSMOLALITY CALCULATION: 268.9 MOSMOL/KG (ref 275–300)
PLATELET # BLD: 256 THOU/MM3 (ref 130–400)
PLATELET ESTIMATE: ADEQUATE
PMV BLD AUTO: 10.7 FL (ref 9.4–12.4)
POTASSIUM REFLEX MAGNESIUM: 3.3 MEQ/L (ref 3.5–5.2)
RBC # BLD: 5.49 MILL/MM3 (ref 4.2–5.4)
SCAN OF BLOOD SMEAR: NORMAL
SEG NEUTROPHILS: 72.5 %
SEGMENTED NEUTROPHILS ABSOLUTE COUNT: 4 THOU/MM3 (ref 1.8–7.7)
SODIUM BLD-SCNC: 135 MEQ/L (ref 135–145)
TOTAL PROTEIN: 7.2 G/DL (ref 6.1–8)
WBC # BLD: 5.5 THOU/MM3 (ref 4.8–10.8)

## 2020-11-18 PROCEDURE — 36415 COLL VENOUS BLD VENIPUNCTURE: CPT

## 2020-11-18 PROCEDURE — 80053 COMPREHEN METABOLIC PANEL: CPT

## 2020-11-18 PROCEDURE — 85025 COMPLETE CBC W/AUTO DIFF WBC: CPT

## 2020-11-18 PROCEDURE — 93005 ELECTROCARDIOGRAM TRACING: CPT | Performed by: EMERGENCY MEDICINE

## 2020-11-18 PROCEDURE — 83735 ASSAY OF MAGNESIUM: CPT

## 2020-11-18 PROCEDURE — 71045 X-RAY EXAM CHEST 1 VIEW: CPT

## 2020-11-18 PROCEDURE — U0003 INFECTIOUS AGENT DETECTION BY NUCLEIC ACID (DNA OR RNA); SEVERE ACUTE RESPIRATORY SYNDROME CORONAVIRUS 2 (SARS-COV-2) (CORONAVIRUS DISEASE [COVID-19]), AMPLIFIED PROBE TECHNIQUE, MAKING USE OF HIGH THROUGHPUT TECHNOLOGIES AS DESCRIBED BY CMS-2020-01-R: HCPCS

## 2020-11-18 PROCEDURE — 2580000003 HC RX 258: Performed by: EMERGENCY MEDICINE

## 2020-11-18 PROCEDURE — 6370000000 HC RX 637 (ALT 250 FOR IP): Performed by: EMERGENCY MEDICINE

## 2020-11-18 PROCEDURE — 99285 EMERGENCY DEPT VISIT HI MDM: CPT

## 2020-11-18 RX ORDER — ACETAMINOPHEN 325 MG/1
650 TABLET ORAL ONCE
Status: COMPLETED | OUTPATIENT
Start: 2020-11-18 | End: 2020-11-18

## 2020-11-18 RX ORDER — 0.9 % SODIUM CHLORIDE 0.9 %
1000 INTRAVENOUS SOLUTION INTRAVENOUS ONCE
Status: COMPLETED | OUTPATIENT
Start: 2020-11-18 | End: 2020-11-18

## 2020-11-18 RX ADMIN — SODIUM CHLORIDE 1000 ML: 9 INJECTION, SOLUTION INTRAVENOUS at 18:19

## 2020-11-18 RX ADMIN — ACETAMINOPHEN 650 MG: 325 TABLET ORAL at 18:20

## 2020-11-18 NOTE — ED TRIAGE NOTES
Pt presents to the ED via EMS with a concern for [address]. Pt states she was in contact with someone 7-10 days ago who tested positive for COVID19. Pt complaints are fatigue, cough, weakness, and diarrhea. Pt denies any chest pain or SOB.

## 2020-11-19 ENCOUNTER — TELEPHONE (OUTPATIENT)
Dept: FAMILY MEDICINE CLINIC | Age: 60
End: 2020-11-19

## 2020-11-19 ENCOUNTER — CARE COORDINATION (OUTPATIENT)
Dept: CARE COORDINATION | Age: 60
End: 2020-11-19

## 2020-11-19 NOTE — ED PROVIDER NOTES
[address] [facility] [address] [address] EMERGENCY DEPT  EMERGENCY DEPARTMENT ENCOUNTER      Pt Name: Jeremiah Velasquez  MRN: [id number]  [place] 1960  Date of evaluation: 11/18/2020  Provider: Alonso MD    [address] [place] [address]       Chief Complaint   Patient presents with    Concern For COVID-19         HISTORY OF PRESENT ILLNESS   (Location/Symptom, Timing/Onset, Context/Setting, Quality, Duration, Modifying Factors, Severity)  Note limiting factors. Patient is a 27-year-old female with history of anemia, obesity presents for evaluation due to concern for COVID-19 infection. Pt reports that she is experiencing fever, cough, shortness of breath, fatigue. She states that her symptoms started a few days ago. She states that today she was simply too weak to get out of bed and walk. She then proceeded to call the ambulance to come to the ER for evaluation. She denies chest pain associated with her symptoms. She states that she had decreased intake of fluids and has not been wanting to eat much. She denies loss of smell or taste. Patient denies history of heart or lung disease. Patient is a non-smoker. Patient denies fainting, headache, neck pain, neck stiffness, vision changes. Nursing Notes were reviewed. REVIEW OF SYSTEMS    (2-9 systems for level 4, 10 or more for level 5)     Review of Systems   All other systems reviewed and are negative. Except as noted above the remainder of the review of systems was reviewed and negative.        PAST MEDICAL HISTORY     Past Medical History:   Diagnosis Date    Anemia     Infertility female     Obesity     Pap smear, as part of routine gynecological examination yrs    Visit for screening mammogram yrs         SURGICAL HISTORY       Past Surgical History:   Procedure Laterality Date    ECTOPIC PREGNANCY SURGERY           CURRENT MEDICATIONS       Discharge Medication List as of 11/18/2020  8:19 PM      CONTINUE these medications which have NOT CHANGED Details   naproxen (NAPROSYN) 500 MG tablet Take 1 tablet by mouth 2 times daily (with meals), Disp-180 tablet,R-0Normal      albuterol sulfate HFA (VENTOLIN HFA) 108 (90 Base) MCG/ACT inhaler Inhale 2 puffs into the lungs 4 times daily, Disp-1 Inhaler, R-0Normal      oxybutynin (DITROPAN) 5 MG tablet Take 1 tablet by mouth 2 times daily, Disp-60 tablet, R-5Normal             ALLERGIES     Patient has no known allergies. FAMILY HISTORY       Family History   Problem Relation Age of Onset    Heart Disease Mother     Cancer Father         pancreatic    Cancer Brother         stomach    Glaucoma Neg Hx           SOCIAL HISTORY       Social History     Socioeconomic History    Marital status: Single     Spouse name: None    Number of children: None    Years of education: None    Highest education level: None   Occupational History    None   Social Needs    Financial resource strain: Somewhat hard    Food insecurity     Worry: Never true     Inability: Never true    Transportation needs     Medical: No     Non-medical: No   Tobacco Use    Smoking status: Former Smoker     Packs/day: 0.00     Years: 5.00     Pack years: 0.00     Types: Cigars     Last attempt to quit: 2011     Years since quittin.5    Smokeless tobacco: Never Used   Substance and Sexual Activity    Alcohol use:  Yes     Alcohol/week: 2.0 standard drinks     Types: 1 Glasses of wine, 1 Cans of beer per week     Comment: occasion    Drug use: No    Sexual activity: Yes     Partners: Male   Lifestyle    Physical activity     Days per week: None     Minutes per session: None    Stress: None   Relationships    Social connections     Talks on phone: None     Gets together: None     Attends [employer] service: None     Active member of club or organization: None     Attends meetings of clubs or organizations: None     Relationship status: None    Intimate partner violence     Fear of current or ex partner: None     Emotionally abused: None     Physically abused: None     Forced sexual activity: None   Other Topics Concern    None   Social History Narrative    None       SCREENINGS        [place] Coma Scale  Eye Opening: Spontaneous  Best Verbal Response: Oriented  Best Motor Response: Obeys commands  [place] Coma Scale Score: 15               PHYSICAL EXAM    (up to 7 for level 4, 8 or more for level 5)     ED Triage Vitals [11/18/20 1622]   BP Temp Temp Source Pulse Resp SpO2 Height Weight   130/70 100.9 °F (38.3 °C) Oral 74 22 93 % -- --       Physical Exam  Vitals signs and nursing note reviewed. Constitutional:       General: She is not in acute distress. Appearance: She is well-developed. She is not diaphoretic. HENT:      Head: Normocephalic. Mouth/Throat:      Pharynx: No oropharyngeal exudate. Eyes:      General:         Right eye: No discharge. Left eye: No discharge. Pupils: Pupils are equal, round, and reactive to light. Neck:      Musculoskeletal: Neck supple. Trachea: No tracheal deviation. Cardiovascular:      Rate and Rhythm: Normal rate and regular rhythm. Heart sounds: Normal heart sounds. No murmur. Pulmonary:      Effort: Pulmonary effort is normal. No respiratory distress. Breath sounds: Normal breath sounds. No stridor. No wheezing or rales. Abdominal:      General: Bowel sounds are normal. There is no distension. Palpations: Abdomen is soft. Tenderness: There is no abdominal tenderness. There is no guarding or rebound. Skin:     General: Skin is warm and dry. Capillary Refill: Capillary refill takes less than 2 seconds. Findings: No erythema or rash. Neurological:      Mental Status: She is alert and oriented to person, place, and time. Cranial Nerves: No cranial nerve deficit. Sensory: No sensory deficit. Motor: No abnormal muscle tone.       Coordination: Coordination normal.   Psychiatric:         Behavior: Behavior normal. Thought Content: Thought content normal.         Judgment: Judgment normal.         DIAGNOSTIC RESULTS     EKG: All EKG's are interpreted by the Emergency Department Physician who either signs or Co-signs this chart in the absence of a cardiologist.    EKG demonstrates normal sinus rhythm with ventricular rate of 66. P intervals 138. QRS is 84. No significant changes when compared to EKG from August 21, 2020. RADIOLOGY:   Non-plain film images such as CT, Ultrasound and MRI are read by the radiologist. Plain radiographic images are visualized and preliminarily interpreted by the emergency physician with the below findings:    Interpretation per the Radiologist below, if available at the time of this note:    XR CHEST PORTABLE   Final Result      No acute intrathoracic process. **This report has been created using voice recognition software. It may contain minor errors which are inherent in voice recognition technology. **      Final report electronically signed by Dr. Yumiko Bridges on 11/18/2020 4:54 PM            ED BEDSIDE ULTRASOUND:   Performed by ED Physician - none    LABS:  Labs Reviewed   CBC WITH AUTO DIFFERENTIAL - Abnormal; Notable for the following components:       Result Value    RBC 5.49 (*)     MCHC 32.0 (*)     RDW-CV 14.6 (*)     All other components within normal limits   COMPREHENSIVE METABOLIC PANEL W/ REFLEX TO MG FOR LOW K - Abnormal; Notable for the following components:    Potassium reflex Magnesium 3.3 (*)     Alb 3.3 (*)     All other components within normal limits   GLOMERULAR FILTRATION RATE, ESTIMATED - Abnormal; Notable for the following components:    Est, Glom Filt Rate 88 (*)     All other components within normal limits   OSMOLALITY - Abnormal; Notable for the following components:    Osmolality Calc 268.9 (*)     All other components within normal limits   ANION GAP   MAGNESIUM   SCAN OF BLOOD SMEAR   URINE RT REFLEX TO CULTURE   COVID-19 AMBULATORY All other labs were within normal range or not returned as of this dictation. EMERGENCY DEPARTMENT COURSE and DIFFERENTIAL DIAGNOSIS/MDM:   Vitals:    Vitals:    11/18/20 1633 11/18/20 1738 11/18/20 1819 11/18/20 1905   BP:  108/76 (!) 126/92 105/64   Pulse:  76 74 68   Resp:  20 20 20   Temp:    99.2 °F (37.3 °C)   TempSrc:    Oral   SpO2: 95% 95% 95% 100%       MDM  Number of Diagnoses or Management Options  Fatigue, unspecified type:   Suspected 2019 novel coronavirus infection:   Diagnosis management comments: 10year-old female who is otherwise healthy presenting for multiple symptoms including cough, shortness of breath, fatigue. Exam is within normal limits. Patient's vital signs are normal aside from a fever of 100.4. Patient given Tylenol in the ER with improvement in her temperature. Blood pressure and heart rate within normal limits. No evidence of hypoxia given pulse oximetry monitoring. Patient feels better after IV fluids. Her labs do not show evidence of acute kidney injury. Chest x-ray does not show any acute infiltrate. I did have a discussion with the patient regarding a high likelihood of COVID-19 infection. She was concerned because she was feeling so fatigued but I did reiterate that she did not meet any criteria for admission at this time given that she had a normal respiratory rate without tachycardia or hypoxia. Patient agreeable to discharge and follow-up with PCP. I did discuss return precautions for shortness of breath, fainting, blue discoloration of her fingers or toes, severe chest pain. REASSESSMENT          CRITICAL CARE TIME     CONSULTS:  None    PROCEDURES:  Unless otherwise noted below, none     Procedures      FINAL IMPRESSION      1. Suspected 2019 novel coronavirus infection    2.  Fatigue, unspecified type          DISPOSITION/PLAN   DISPOSITION        PATIENT REFERRED TO:  DO Leo Madden [address] [place] [address]

## 2020-11-19 NOTE — CARE COORDINATION
Attempted to reach [facility] TRANSITIONAL CARE & REHABILITATION today for ED f/u [place] outreach. No answer. Mailbox full.

## 2020-11-20 ENCOUNTER — CARE COORDINATION (OUTPATIENT)
Dept: CARE COORDINATION | Age: 60
End: 2020-11-20

## 2020-11-20 NOTE — CARE COORDINATION
educated patient on any new and changed medications related to discharge diagnosis     Patient/family/caregiver given information for GetWell Loop and agrees to enroll no  Patient's preferred e-mail: n/a   Patient's preferred phone number: n/a  Based on Loop alert triggers, patient will be contacted by nurse care manager for worsening symptoms. Plan for follow-up call in 3-5 days based on severity of symptoms and risk factors. Georgette Flores is self isolating. Denies worsening of symptoms. Declines Loop.

## 2020-11-20 NOTE — CARE COORDINATION
Pt called asking if COVID results back. Informed her results still pending at this time. Verbalizes understanding.

## 2020-11-21 ENCOUNTER — CARE COORDINATION (OUTPATIENT)
Dept: CARE COORDINATION | Age: 60
End: 2020-11-21

## 2020-11-21 LAB — SARS-COV-2: DETECTED

## 2020-11-21 NOTE — CARE COORDINATION
Received call from [facility] TRANSITIONAL CARE & REHABILITATION asking about COVID test results. Informed that they are still pending at this time. Verbalizes understanding. Will f/u with pt on 11/23. Reports s/s improving.

## 2020-11-23 ENCOUNTER — TELEPHONE (OUTPATIENT)
Dept: FAMILY MEDICINE CLINIC | Age: 60
End: 2020-11-23

## 2020-11-23 ENCOUNTER — CARE COORDINATION (OUTPATIENT)
Dept: CARE COORDINATION | Age: 60
End: 2020-11-23

## 2020-11-23 NOTE — CARE COORDINATION
Patient contacted regarding COVID-19 risk and screening. Discussed COVID-19 related testing which was available at this time. Test results were positive. Patient informed of results, if available? Pt notified of positive results. Care Transition Nurse/ Ambulatory Care Manager contacted the patient by telephone to perform follow-up assessment. Verified name and  with patient as identifiers. Patient has following risk factors of: no known risk factors. Symptoms reviewed with patient who verbalized the following symptoms: fatigue, cough, diarrhea and no worsening symptoms. Due to no new or worsening symptoms encounter was not routed to provider for escalation. Education provided regarding infection prevention, and signs and symptoms of COVID-19 and when to seek medical attention with patient who verbalized understanding. Discussed exposure protocols and quarantine from [address] you at higher risk for severe illness  and given an opportunity for questions and concerns. The patient agrees to contact the COVID-19 hotline [phone] or PCP office for questions related to their healthcare. CTN/ACM provided contact information for future reference. From CDC: Are you at higher risk for severe illness?  Wash your hands often.  Avoid close contact (6 feet, which is about two arm lengths) with people who are sick.  Put distance between yourself and other people if COVID-19 is spreading in your community.  Clean and disinfect frequently touched surfaces.  Avoid all cruise travel and non-essential air travel.  Call your healthcare professional if you have concerns about COVID-19 and your underlying condition or if you are sick. For more information on steps you can take to protect yourself, see CDC's How to [address] [place] [address] for follow-up call in 3-5 days based on severity of symptoms and risk factors.   Pt reports that she continues to have diarrhea but no worsening s/s. Encouraged rest and hydration. Drinking Gatorade. Encouraged to f/u with her PCP. Aware to continue to quarantine at this time. Provided pt with My Chart Help Desk number as she has been having difficulty with sandrine. Aware to monitor s/s closely. Declines Loop.

## 2020-11-24 NOTE — TELEPHONE ENCOUNTER
Called and discussed with Patient. She has already been notified of her results by the ED. Denies any needs at this time.

## 2020-11-30 ENCOUNTER — CARE COORDINATION (OUTPATIENT)
Dept: CARE COORDINATION | Age: 60
End: 2020-11-30

## 2020-11-30 NOTE — CARE COORDINATION
You Patient resolved from the Care Transitions episode on 11/30  Discussed COVID-19 related testing which was available at this time. Test results were positive. Patient informed of results, if available?previously notified of results    Patient/family has been provided the following resources and education related to COVID-19:                         Signs, symptoms and red flags related to COVID-19            CDC exposure and quarantine guidelines            [facility] exposure contact - [phone]            Contact for their local Department of Health                 Patient currently reports that the following symptoms have improved: pt reports all symptoms have resolved except for runny nose. No further outreach scheduled with this CTN/ACM. Episode of Care resolved. Patient has this CTN/ACM contact information if future needs arise.

## 2020-12-15 ENCOUNTER — APPOINTMENT (OUTPATIENT)
Dept: GENERAL RADIOLOGY | Age: 60
End: 2020-12-15
Payer: MEDICARE

## 2020-12-15 ENCOUNTER — HOSPITAL ENCOUNTER (EMERGENCY)
Age: 60
Discharge: HOME | End: 2020-12-16
Attending: EMERGENCY MEDICINE
Payer: MEDICARE

## 2020-12-15 PROCEDURE — 6360000002 HC RX W HCPCS: Performed by: EMERGENCY MEDICINE

## 2020-12-15 PROCEDURE — 72100 X-RAY EXAM L-S SPINE 2/3 VWS: CPT

## 2020-12-15 PROCEDURE — 96374 THER/PROPH/DIAG INJ IV PUSH: CPT

## 2020-12-15 PROCEDURE — 6370000000 HC RX 637 (ALT 250 FOR IP): Performed by: EMERGENCY MEDICINE

## 2020-12-15 PROCEDURE — 96375 TX/PRO/DX INJ NEW DRUG ADDON: CPT

## 2020-12-15 PROCEDURE — 99285 EMERGENCY DEPT VISIT HI MDM: CPT

## 2020-12-15 RX ORDER — METHYLPREDNISOLONE SODIUM SUCCINATE 125 MG/2ML
125 INJECTION, POWDER, LYOPHILIZED, FOR SOLUTION INTRAMUSCULAR; INTRAVENOUS ONCE
Status: COMPLETED | OUTPATIENT
Start: 2020-12-15 | End: 2020-12-15

## 2020-12-15 RX ORDER — CYCLOBENZAPRINE HCL 10 MG
10 TABLET ORAL ONCE
Status: COMPLETED | OUTPATIENT
Start: 2020-12-15 | End: 2020-12-15

## 2020-12-15 RX ORDER — KETOROLAC TROMETHAMINE 30 MG/ML
30 INJECTION, SOLUTION INTRAMUSCULAR; INTRAVENOUS ONCE
Status: COMPLETED | OUTPATIENT
Start: 2020-12-15 | End: 2020-12-15

## 2020-12-15 RX ADMIN — CYCLOBENZAPRINE 10 MG: 10 TABLET, FILM COATED ORAL at 23:37

## 2020-12-15 RX ADMIN — METHYLPREDNISOLONE SODIUM SUCCINATE 125 MG: 125 INJECTION, POWDER, FOR SOLUTION INTRAMUSCULAR; INTRAVENOUS at 23:37

## 2020-12-15 RX ADMIN — KETOROLAC TROMETHAMINE 30 MG: 30 INJECTION, SOLUTION INTRAMUSCULAR; INTRAVENOUS at 23:37

## 2020-12-15 ASSESSMENT — ENCOUNTER SYMPTOMS
SHORTNESS OF BREATH: 0
NAUSEA: 0
CHEST TIGHTNESS: 0
BACK PAIN: 1
DIARRHEA: 0
ABDOMINAL DISTENTION: 0
VOMITING: 0
PHOTOPHOBIA: 0
EYE REDNESS: 0
SORE THROAT: 0
RHINORRHEA: 0
EYE PAIN: 0
EYE ITCHING: 0
EYE DISCHARGE: 0
WHEEZING: 0
CONSTIPATION: 0
COUGH: 0
ABDOMINAL PAIN: 0
STRIDOR: 0

## 2020-12-15 ASSESSMENT — PAIN DESCRIPTION - DESCRIPTORS: DESCRIPTORS: ACHING

## 2020-12-15 ASSESSMENT — PAIN DESCRIPTION - ORIENTATION: ORIENTATION: LOWER

## 2020-12-15 ASSESSMENT — PAIN DESCRIPTION - LOCATION: LOCATION: BACK

## 2020-12-15 ASSESSMENT — PAIN SCALES - GENERAL: PAINLEVEL_OUTOF10: 7

## 2020-12-15 ASSESSMENT — PAIN DESCRIPTION - PAIN TYPE: TYPE: ACUTE PAIN

## 2020-12-15 NOTE — LETTER
[facility]  [address] [place] [address]  Phone: [phone]               December 16, 2020    Patient: Mckenna Briceño   YOB: 1960   Date of Visit: 12/15/2020       To Whom It May Concern:    Ayush Kitchen was seen and treated in our emergency department on 12/15/2020. She may return to work on 12/19/2020.       Sincerely,       Samantha Olson MD         Signature:__________________________________

## 2020-12-16 ENCOUNTER — TELEPHONE (OUTPATIENT)
Dept: FAMILY MEDICINE CLINIC | Age: 60
End: 2020-12-16

## 2020-12-16 VITALS
TEMPERATURE: 98.1 F | SYSTOLIC BLOOD PRESSURE: 132 MMHG | RESPIRATION RATE: 19 BRPM | HEIGHT: 59 IN | WEIGHT: 219 LBS | BODY MASS INDEX: 44.15 KG/M2 | HEART RATE: 72 BPM | DIASTOLIC BLOOD PRESSURE: 87 MMHG | OXYGEN SATURATION: 97 %

## 2020-12-16 RX ORDER — NAPROXEN 500 MG/1
500 TABLET ORAL 2 TIMES DAILY PRN
Qty: 20 TABLET | Refills: 0 | Status: SHIPPED | OUTPATIENT
Start: 2020-12-16 | End: 2021-05-20 | Stop reason: ALTCHOICE

## 2020-12-16 RX ORDER — CYCLOBENZAPRINE HCL 10 MG
10 TABLET ORAL 3 TIMES DAILY PRN
Qty: 21 TABLET | Refills: 0 | Status: SHIPPED | OUTPATIENT
Start: 2020-12-16 | End: 2020-12-26

## 2020-12-16 RX ORDER — METHYLPREDNISOLONE 4 MG/1
TABLET ORAL
Qty: 1 KIT | Refills: 0 | Status: SHIPPED | OUTPATIENT
Start: 2020-12-16 | End: 2020-12-22

## 2020-12-16 NOTE — ED PROVIDER NOTES
and atraumatic. Nose: Nose normal.   Eyes:      General: No scleral icterus. Right eye: No discharge. Left eye: No discharge. Conjunctiva/sclera: Conjunctivae normal.      Pupils: Pupils are equal, round, and reactive to light. Neck:      Musculoskeletal: Normal range of motion and neck supple. Vascular: No JVD. Trachea: No tracheal deviation. Cardiovascular:      Rate and Rhythm: Normal rate and regular rhythm. Heart sounds: Normal heart sounds. No murmur. No friction rub. No gallop. Pulmonary:      Effort: Pulmonary effort is normal. No respiratory distress. Breath sounds: Normal breath sounds. No stridor. No wheezing or rales. Chest:      Chest wall: No tenderness. Abdominal:      General: Bowel sounds are normal. There is no distension. Palpations: Abdomen is soft. There is no mass. Tenderness: There is no abdominal tenderness. There is no guarding or rebound. Hernia: No hernia is present. Musculoskeletal:         General: Tenderness present. No deformity. Comments: Tenderness to lower back greater to left side. Lymphadenopathy:      Cervical: No cervical adenopathy. Skin:     General: Skin is warm and dry. Capillary Refill: Capillary refill takes less than 2 seconds. Coloration: Skin is not pale. Findings: No erythema or rash. Neurological:      Mental Status: She is alert and oriented to person, place, and time. Cranial Nerves: No cranial nerve deficit. Sensory: No sensory deficit. Motor: No abnormal muscle tone. Coordination: Coordination normal.      Deep Tendon Reflexes: Reflexes normal.   Psychiatric:         Behavior: Behavior normal.         Thought Content:  Thought content normal.         Judgment: Judgment normal.         DIFFERENTIAL DIAGNOSIS:   low back strain, disc herniation, sciatica    DIAGNOSTIC RESULTS   EKG: All EKG's are interpreted by the Emergency Department Physician who either signs or Co-signsthis chart in the absence of a cardiologist.  Interpreted by me  Not indicated    RADIOLOGY: non-plain film images(s) such as CT, Ultrasound and MRI are read by the radiologist.  XR LUMBAR SPINE (2-3 VIEWS)   Final Result   1. No acute findings. 2.  Lumbar facet hypertrophic arthropathy. This document has been electronically signed by: Genny De Anda MD on    12/15/2020 10:06 PM             LABS:   No results found for this visit on 12/15/20. EMERGENCY DEPARTMENT COURSE:   Vitals:    Vitals:    12/15/20 2100 12/15/20 2227   BP: (!) 141/87    Pulse: 88    Resp: 19    Temp: 98.1 °F (36.7 °C)    SpO2: 95%    Weight:  219 lb (99.3 kg)   Height:  4' 11\" (1.499 m)     9:13 PM: Patient is seen and evaluated in a timely fashion. ACTIONS:  Large bore IV  Tele monitor  XR LUMBAR SPINE (2-3 VIEWS)  Labs Reviewed - No data to display  Medications   methylPREDNISolone sodium (SOLU-MEDROL) injection 125 mg (has no administration in time range)   cyclobenzaprine (FLEXERIL) tablet 10 mg (has no administration in time range)   ketorolac (TORADOL) injection 30 mg (has no administration in time range)       MEDICAL DECISION MAKINGS:      Lumbar x-rays no acute findings. Clinical this is lumbar strain, no clinical findings suggesting this is sciatic nerve pain or lumbar disc herniation. Patient was medicated with cyclobenzaprine, Solu-Medrol, and Toradol with pain relief. Discharged with [facility] occupational health follow-up today. She is prescribed Naprosyn, Flexeril, and Medrol Dosepak. CRITICAL CARE:   None    CONSULTS:  None    PROCEDURES:  None    FINAL IMPRESSION      1.  Strain of lumbar region, initial encounter          DISPOSITION/PLAN   Home    PATIENT REFERRED TO:  [address] [place] [address]  [phone]  Today  ED discharge follow up      DISCHARGE MEDICATIONS:  New Prescriptions    CYCLOBENZAPRINE (FLEXERIL) 10 MG TABLET    Take 1 tablet by mouth 3 times daily as needed for Muscle spasms    METHYLPREDNISOLONE (MEDROL, YANI,) 4 MG TABLET    Take by mouth.     NAPROXEN (NAPROSYN) 500 MG TABLET    Take 1 tablet by mouth 2 times daily as needed for Pain       (Please note that portions of this note were completed with a voice recognition program.  Efforts were made to edit the dictations but occasionally words aremis-transcribed.)    MD Clari Elizondo MD  12/16/20 9083

## 2020-12-16 NOTE — LETTER
[address] [place] [address],[address] [facility] [address]  Phone: [phone]  Fax: [phone] [facility],         December 16, 2020    Arislisa Feldman [facility] [address] [id number]    Dear Marge Torres,    Thank you for choosing [facility] facility on 12/16/20. Dr.Johnathan TIMUR George wanted to make sure that you understand your discharge instructions and that you were able to fill any prescriptions that may have been ordered for you. Please contact the office at the above phone number if the ED advised to you follow up with Jazz Mcnamara, or if you have any further questions or needs. Also, did you know -   *Visiting the ED for a non-emergency could result in higher co-pays than you would normally be subject to paying? *You can call your doctor's office even after hours so they can direct you to the most appropriate care. [facility]) practices can often offer you an appointment on the same day that you call. Many [address] appointments; check our website for availability in your community, [email or web address]    Evisits are now available for patients through [employer] for certain conditions:  ? Sinus, cold and or cough  ? Heartburn  ? Urinary problems  ? Diarrhea  ? Poison Ivy  ? Vaginal discharge  ? Headache  ? Back Pain  ? Pink eye  ? Flu    If you do not have [employer] and are interested, please contact the office and a staff member may assist you or go to [email or web address]. Thank you for choosing [facility]'s.                         Sincerely,     Argenis Cook DO and your Health Care Team

## 2020-12-16 NOTE — ED NOTES
Patient lying in bed with blankets watching tv at this time. Patient respirations are regular and unlabored. Patient appears to be in no current distress. Patient VSS. Call light is within reach. Patient expresses no needs at this time.        Mateus Howard RN  12/16/20 0111

## 2020-12-16 NOTE — ED NOTES
Patient sitting in wheelchair for comfort and on her phone at this time. Patient respirations are regular and unlabored. Patient appears to be in no current distress. Patient VSS. Call light is within reach. Patient expresses no needs at this time.        Gillian Flores RN  12/16/20 8030

## 2020-12-16 NOTE — ED NOTES
Pt up in bed with blankets over bottom half. Patient updated on plan of care at this time and expresses no concerns regarding treatment. Patient VSS. Respirations are regular and unlabored, patient is alert and oriented x4. Patient bed rails up x2 and call light within reach. Will continue to monitor.        Kristen Yanes RN  12/15/20 8770

## 2020-12-17 ENCOUNTER — NURSE ONLY (OUTPATIENT)
Dept: LAB | Age: 60
End: 2020-12-17

## 2020-12-17 LAB — GFR SERPL CREATININE-BSD FRML MDRD: 73 ML/MIN/1.73M2

## 2020-12-28 RX ORDER — METHYLPREDNISOLONE 4 MG/1
4 TABLET ORAL SEE ADMIN INSTRUCTIONS
Status: ON HOLD | COMMUNITY
End: 2020-12-30

## 2020-12-28 RX ORDER — CYCLOBENZAPRINE HCL 10 MG
10 TABLET ORAL 3 TIMES DAILY PRN
COMMUNITY
End: 2021-06-30 | Stop reason: SDUPTHER

## 2020-12-28 NOTE — PROGRESS NOTES
Pt had Covid-19 11/18/2020. Pt does not have any covid symptoms currently and does not require retest per [facility] policy/manager Bronwyn Sandhoff RN OR.

## 2020-12-28 NOTE — PROGRESS NOTES
NPO after midnight except sip of water with heart/BP meds  Follow all instructions given by surgeon including medications to hold  Bring insurance card and photo ID  Shower the night before or morning of procedure with liquid antibacterial soap  Wear comfortable clothing  Do not bring jewelry or valuables  Bring list of medications with dosage and how often taken if not reviewed   needed at discharge at least 25years old  Call PAT at [phone] for questions    Instructed to call surgery center at [phone] upon arrival to speak with  before entering building. Covid screen due  at [facility] 6 to 7 days before procedure. Pt was positive for Covid-19 11/18/2020 and is out of quarantine. Pt states she lives close to surgery center and is walking there morning of procedure and friend will pick her up for discharge. Instructed pt that friend needs to be available to speak with Dr. Lory Berman post op for update and listen to discharge instructions/sign-release and be with pt after discharge for home care. Pt voiced understanding and states they will be able to do that. Pt does not have any covid symptoms currently and does not require retest per [facility] policy. In preparation for their surgical procedure above patient was screened for Obstructive Sleep Apnea (GRABIEL) using the STOP-Bang Questionnaire by the Pre-Admission Testing department. This is a pre-surgical screening tool for patient safety and serves as a recommendation, this WILL NOT cause cancellation of surgery. STOP-Bang Questionnaire  * Do you currently see a pulmonologist?  No         1. Do you snore loudly (able to be heard in the next room)? No    2. Do you often feel tired or sleepy during the daytime? No       3. Has anyone ever told you that you stop breathing during your sleep? No    4. Do you have or are you being treated for high blood pressure? No      5. BMI more than 35?

## 2020-12-28 NOTE — PROGRESS NOTES
EKG clearance form given to Dr. Sandro King  for review. OK to proceed with surgery as planned at surgery center.

## 2020-12-30 ENCOUNTER — HOSPITAL ENCOUNTER (OUTPATIENT)
Age: 60
Setting detail: OUTPATIENT SURGERY
Discharge: HOME | End: 2020-12-30
Attending: OBSTETRICS & GYNECOLOGY | Admitting: OBSTETRICS & GYNECOLOGY
Payer: MEDICARE

## 2020-12-30 ENCOUNTER — ANESTHESIA EVENT (OUTPATIENT)
Dept: OPERATING ROOM | Age: 60
End: 2020-12-30
Payer: MEDICARE

## 2020-12-30 ENCOUNTER — ANESTHESIA (OUTPATIENT)
Dept: OPERATING ROOM | Age: 60
End: 2020-12-30
Payer: MEDICARE

## 2020-12-30 VITALS
TEMPERATURE: 96.8 F | RESPIRATION RATE: 9 BRPM | OXYGEN SATURATION: 100 % | SYSTOLIC BLOOD PRESSURE: 112 MMHG | DIASTOLIC BLOOD PRESSURE: 66 MMHG

## 2020-12-30 VITALS
SYSTOLIC BLOOD PRESSURE: 136 MMHG | DIASTOLIC BLOOD PRESSURE: 80 MMHG | WEIGHT: 216.8 LBS | OXYGEN SATURATION: 97 % | HEART RATE: 70 BPM | RESPIRATION RATE: 17 BRPM | TEMPERATURE: 96.8 F | HEIGHT: 59 IN | BODY MASS INDEX: 43.71 KG/M2

## 2020-12-30 PROBLEM — N95.0 PMB (POSTMENOPAUSAL BLEEDING): Status: ACTIVE | Noted: 2020-12-30

## 2020-12-30 PROCEDURE — 3700000000 HC ANESTHESIA ATTENDED CARE: Performed by: OBSTETRICS & GYNECOLOGY

## 2020-12-30 PROCEDURE — 6360000002 HC RX W HCPCS: Performed by: REGISTERED NURSE

## 2020-12-30 PROCEDURE — 7100000001 HC PACU RECOVERY - ADDTL 15 MIN: Performed by: OBSTETRICS & GYNECOLOGY

## 2020-12-30 PROCEDURE — 2709999900 HC NON-CHARGEABLE SUPPLY: Performed by: OBSTETRICS & GYNECOLOGY

## 2020-12-30 PROCEDURE — 7100000000 HC PACU RECOVERY - FIRST 15 MIN: Performed by: OBSTETRICS & GYNECOLOGY

## 2020-12-30 PROCEDURE — 2500000003 HC RX 250 WO HCPCS: Performed by: REGISTERED NURSE

## 2020-12-30 PROCEDURE — 88341 IMHCHEM/IMCYTCHM EA ADD ANTB: CPT

## 2020-12-30 PROCEDURE — 88342 IMHCHEM/IMCYTCHM 1ST ANTB: CPT

## 2020-12-30 PROCEDURE — 88305 TISSUE EXAM BY PATHOLOGIST: CPT

## 2020-12-30 PROCEDURE — 7100000010 HC PHASE II RECOVERY - FIRST 15 MIN: Performed by: OBSTETRICS & GYNECOLOGY

## 2020-12-30 PROCEDURE — 2580000003 HC RX 258: Performed by: REGISTERED NURSE

## 2020-12-30 PROCEDURE — 3700000001 HC ADD 15 MINUTES (ANESTHESIA): Performed by: OBSTETRICS & GYNECOLOGY

## 2020-12-30 PROCEDURE — 3600000013 HC SURGERY LEVEL 3 ADDTL 15MIN: Performed by: OBSTETRICS & GYNECOLOGY

## 2020-12-30 PROCEDURE — 7100000011 HC PHASE II RECOVERY - ADDTL 15 MIN: Performed by: OBSTETRICS & GYNECOLOGY

## 2020-12-30 PROCEDURE — 3600000003 HC SURGERY LEVEL 3 BASE: Performed by: OBSTETRICS & GYNECOLOGY

## 2020-12-30 RX ORDER — SODIUM CHLORIDE 9 MG/ML
INJECTION, SOLUTION INTRAVENOUS CONTINUOUS PRN
Status: DISCONTINUED | OUTPATIENT
Start: 2020-12-30 | End: 2020-12-30 | Stop reason: SDUPTHER

## 2020-12-30 RX ORDER — MIDAZOLAM HYDROCHLORIDE 1 MG/ML
INJECTION INTRAMUSCULAR; INTRAVENOUS PRN
Status: DISCONTINUED | OUTPATIENT
Start: 2020-12-30 | End: 2020-12-30 | Stop reason: SDUPTHER

## 2020-12-30 RX ORDER — DIPHENHYDRAMINE HYDROCHLORIDE 50 MG/ML
INJECTION INTRAMUSCULAR; INTRAVENOUS PRN
Status: DISCONTINUED | OUTPATIENT
Start: 2020-12-30 | End: 2020-12-30 | Stop reason: SDUPTHER

## 2020-12-30 RX ORDER — MORPHINE SULFATE 2 MG/ML
2 INJECTION, SOLUTION INTRAMUSCULAR; INTRAVENOUS EVERY 5 MIN PRN
Status: DISCONTINUED | OUTPATIENT
Start: 2020-12-30 | End: 2020-12-30 | Stop reason: HOSPADM

## 2020-12-30 RX ORDER — KETOROLAC TROMETHAMINE 30 MG/ML
INJECTION, SOLUTION INTRAMUSCULAR; INTRAVENOUS PRN
Status: DISCONTINUED | OUTPATIENT
Start: 2020-12-30 | End: 2020-12-30 | Stop reason: SDUPTHER

## 2020-12-30 RX ORDER — LABETALOL 20 MG/4 ML (5 MG/ML) INTRAVENOUS SYRINGE
10 EVERY 10 MIN PRN
Status: DISCONTINUED | OUTPATIENT
Start: 2020-12-30 | End: 2020-12-30 | Stop reason: HOSPADM

## 2020-12-30 RX ORDER — SODIUM CHLORIDE, SODIUM LACTATE, POTASSIUM CHLORIDE, CALCIUM CHLORIDE 600; 310; 30; 20 MG/100ML; MG/100ML; MG/100ML; MG/100ML
INJECTION, SOLUTION INTRAVENOUS SEE ADMIN INSTRUCTIONS
Status: DISCONTINUED | OUTPATIENT
Start: 2020-12-30 | End: 2020-12-30 | Stop reason: HOSPADM

## 2020-12-30 RX ORDER — MORPHINE SULFATE 2 MG/ML
4 INJECTION, SOLUTION INTRAMUSCULAR; INTRAVENOUS
Status: DISCONTINUED | OUTPATIENT
Start: 2020-12-30 | End: 2020-12-30 | Stop reason: HOSPADM

## 2020-12-30 RX ORDER — DEXAMETHASONE SODIUM PHOSPHATE 10 MG/ML
INJECTION, EMULSION INTRAMUSCULAR; INTRAVENOUS PRN
Status: DISCONTINUED | OUTPATIENT
Start: 2020-12-30 | End: 2020-12-30 | Stop reason: SDUPTHER

## 2020-12-30 RX ORDER — ACETAMINOPHEN 325 MG/1
650 TABLET ORAL EVERY 4 HOURS PRN
Status: DISCONTINUED | OUTPATIENT
Start: 2020-12-30 | End: 2020-12-30 | Stop reason: HOSPADM

## 2020-12-30 RX ORDER — MORPHINE SULFATE 2 MG/ML
2 INJECTION, SOLUTION INTRAMUSCULAR; INTRAVENOUS
Status: DISCONTINUED | OUTPATIENT
Start: 2020-12-30 | End: 2020-12-30 | Stop reason: HOSPADM

## 2020-12-30 RX ORDER — FENTANYL CITRATE 50 UG/ML
INJECTION, SOLUTION INTRAMUSCULAR; INTRAVENOUS PRN
Status: DISCONTINUED | OUTPATIENT
Start: 2020-12-30 | End: 2020-12-30 | Stop reason: SDUPTHER

## 2020-12-30 RX ORDER — LIDOCAINE HYDROCHLORIDE 20 MG/ML
INJECTION, SOLUTION EPIDURAL; INFILTRATION; INTRACAUDAL; PERINEURAL PRN
Status: DISCONTINUED | OUTPATIENT
Start: 2020-12-30 | End: 2020-12-30 | Stop reason: SDUPTHER

## 2020-12-30 RX ORDER — IBUPROFEN 800 MG/1
800 TABLET ORAL EVERY 6 HOURS PRN
Status: DISCONTINUED | OUTPATIENT
Start: 2020-12-30 | End: 2020-12-30 | Stop reason: HOSPADM

## 2020-12-30 RX ORDER — HYDROCODONE BITARTRATE AND ACETAMINOPHEN 5; 325 MG/1; MG/1
2 TABLET ORAL EVERY 4 HOURS PRN
Status: DISCONTINUED | OUTPATIENT
Start: 2020-12-30 | End: 2020-12-30 | Stop reason: HOSPADM

## 2020-12-30 RX ORDER — HYDROCODONE BITARTRATE AND ACETAMINOPHEN 5; 325 MG/1; MG/1
1 TABLET ORAL EVERY 6 HOURS PRN
Qty: 12 TABLET | Refills: 0 | Status: SHIPPED | OUTPATIENT
Start: 2020-12-30 | End: 2021-01-02

## 2020-12-30 RX ORDER — ONDANSETRON 2 MG/ML
INJECTION INTRAMUSCULAR; INTRAVENOUS PRN
Status: DISCONTINUED | OUTPATIENT
Start: 2020-12-30 | End: 2020-12-30 | Stop reason: SDUPTHER

## 2020-12-30 RX ORDER — HYDROCODONE BITARTRATE AND ACETAMINOPHEN 5; 325 MG/1; MG/1
1 TABLET ORAL EVERY 4 HOURS PRN
Status: DISCONTINUED | OUTPATIENT
Start: 2020-12-30 | End: 2020-12-30 | Stop reason: HOSPADM

## 2020-12-30 RX ORDER — FENTANYL CITRATE 50 UG/ML
50 INJECTION, SOLUTION INTRAMUSCULAR; INTRAVENOUS EVERY 5 MIN PRN
Status: DISCONTINUED | OUTPATIENT
Start: 2020-12-30 | End: 2020-12-30 | Stop reason: HOSPADM

## 2020-12-30 RX ORDER — PROPOFOL 10 MG/ML
INJECTION, EMULSION INTRAVENOUS PRN
Status: DISCONTINUED | OUTPATIENT
Start: 2020-12-30 | End: 2020-12-30 | Stop reason: SDUPTHER

## 2020-12-30 RX ADMIN — KETOROLAC TROMETHAMINE 30 MG: 30 INJECTION, SOLUTION INTRAMUSCULAR at 08:13

## 2020-12-30 RX ADMIN — SODIUM CHLORIDE: 9 INJECTION, SOLUTION INTRAVENOUS at 07:47

## 2020-12-30 RX ADMIN — FENTANYL CITRATE 50 MCG: 50 INJECTION, SOLUTION INTRAMUSCULAR; INTRAVENOUS at 07:57

## 2020-12-30 RX ADMIN — LIDOCAINE HYDROCHLORIDE 60 MG: 20 INJECTION, SOLUTION EPIDURAL; INFILTRATION; INTRACAUDAL; PERINEURAL at 07:52

## 2020-12-30 RX ADMIN — DEXAMETHASONE SODIUM PHOSPHATE 4 MG: 10 INJECTION, EMULSION INTRAMUSCULAR; INTRAVENOUS at 07:55

## 2020-12-30 RX ADMIN — MIDAZOLAM HYDROCHLORIDE 2 MG: 1 INJECTION, SOLUTION INTRAMUSCULAR; INTRAVENOUS at 07:47

## 2020-12-30 RX ADMIN — DIPHENHYDRAMINE HYDROCHLORIDE 12.5 MG: 50 INJECTION, SOLUTION INTRAMUSCULAR; INTRAVENOUS at 07:55

## 2020-12-30 RX ADMIN — FENTANYL CITRATE 50 MCG: 50 INJECTION, SOLUTION INTRAMUSCULAR; INTRAVENOUS at 08:10

## 2020-12-30 RX ADMIN — ONDANSETRON HYDROCHLORIDE 4 MG: 4 INJECTION, SOLUTION INTRAMUSCULAR; INTRAVENOUS at 07:55

## 2020-12-30 RX ADMIN — PROPOFOL 140 MG: 10 INJECTION, EMULSION INTRAVENOUS at 07:52

## 2020-12-30 ASSESSMENT — PULMONARY FUNCTION TESTS
PIF_VALUE: 2
PIF_VALUE: 1
PIF_VALUE: 4
PIF_VALUE: 1
PIF_VALUE: 2
PIF_VALUE: 15
PIF_VALUE: 3
PIF_VALUE: 3
PIF_VALUE: 5
PIF_VALUE: 3
PIF_VALUE: 3
PIF_VALUE: 0
PIF_VALUE: 5
PIF_VALUE: 2
PIF_VALUE: 2
PIF_VALUE: 4
PIF_VALUE: 7
PIF_VALUE: 1
PIF_VALUE: 2
PIF_VALUE: 2
PIF_VALUE: 0
PIF_VALUE: 3
PIF_VALUE: 0
PIF_VALUE: 13
PIF_VALUE: 0
PIF_VALUE: 4
PIF_VALUE: 10
PIF_VALUE: 18
PIF_VALUE: 3

## 2020-12-30 ASSESSMENT — PAIN - FUNCTIONAL ASSESSMENT: PAIN_FUNCTIONAL_ASSESSMENT: 0-10

## 2020-12-30 ASSESSMENT — PAIN SCALES - WONG BAKER: WONGBAKER_NUMERICALRESPONSE: 0

## 2020-12-30 NOTE — H&P
[address] [address]  History and Physical Update    Pt Name: Oscar Myers  MRN: [id number]  YOB: 1960  Date of evaluation: 12/30/2020    [x] I have examined the patient and reviewed the H&P/Consult and there are no changes to the patient or plans.     [] I have examined the patient and reviewed the H&P/Consult and have noted the following changes:            Jessica Melchor MD  Electronically signed 12/30/2020 at 8:14 AM

## 2020-12-30 NOTE — PROGRESS NOTES
0820-Patient to Phase I via cart. Report received from [facility] and [facility]. Patient placed on cardiac monitor. Vitals obtained and stable. Respirations even and unlabored on room air. Patient not responding to command at this time. No vaginal drainage noted. Nicole pad placed. 0825-Patient continues to not respond to command. Respirations even and unlabored on room air. 0830-Patient opens eyes on command. Denies pain and cramping. Resting in bed. 0845-Patient continues to deny pain and nausea. Patient awake but remains drowsy. Denies needs. Vitals remain stable. See flow sheet. 0850-Patient meets criteria for Phase II.  0853-Patient provided with snack and drink. Patient instructed on call light use. Patients friend call to notify that patient is in recovery room. Instructed that patient will be ready for pickup around 9:30AM. Verbalized understanding. 0905-Patient tolerating snack and drink. Denies needs at this time. 0920-IV removed with no complications. Patient getting dressed at bedside. 0935-Discharge instructions reviewed. With patient and friend. Verbalized understanding. 0937-Patient discharged in stable condition. All belongings given to patient. Patient ambulated to car with assistance from RN. Patient tolerated well.

## 2020-12-30 NOTE — ANESTHESIA PRE PROCEDURE
Department of Anesthesiology  Preprocedure Note       Name:  Prieto Arce   Age:  61 y.o.  :  1960                                          MRN:  [id number]         Date:  2020      Surgeon: Rina Abbott):  Milli Vides MD    Procedure: Procedure(s):  DILATATION AND CURETTAGE HYSTEROSCOPY    Medications prior to admission:   Prior to Admission medications    Medication Sig Start Date End Date Taking? Authorizing Provider   cyclobenzaprine (FLEXERIL) 10 MG tablet Take 10 mg by mouth 3 times daily as needed for Muscle spasms   Yes Historical Provider, MD   naproxen (NAPROSYN) 500 MG tablet Take 1 tablet by mouth 2 times daily as needed for Pain 20 Yes Ilir James MD   albuterol sulfate HFA (VENTOLIN HFA) 108 (90 Base) MCG/ACT inhaler Inhale 2 puffs into the lungs 4 times daily 3/13/20  Yes JOANNA Pendleton CNP   oxybutynin (DITROPAN) 5 MG tablet Take 1 tablet by mouth 2 times daily 3/13/20  Yes JOANNA Pendleton CNP       Current medications:    No current facility-administered medications for this encounter. Allergies:  No Known Allergies    Problem List:    Patient Active Problem List   Diagnosis Code    HTN (hypertension) I10    Hyperopia of both eyes with astigmatism and presbyopia H52.03, H52.203, H52.4    Urge incontinence of urine N39.41    BMI 40.0-44.9, adult ([facility] Z68.41    Stress F43.9       Past Medical History:        Diagnosis Date    Anemia     Infertility female     Obesity     Pap smear, as part of routine gynecological examination yrs    Visit for screening mammogram yrs       Past Surgical History:        Procedure Laterality Date    ECTOPIC PREGNANCY SURGERY         Social History:    Social History     Tobacco Use    Smoking status: Former Smoker     Packs/day: 0.00     Years: 5.00     Pack years: 0.00     Types: Cigars     Quit date: 2011     Years since quittin.6    Smokeless tobacco: Never Used   Substance Use Topics    Alcohol use:  Yes Alcohol/week: 2.0 standard drinks     Types: 1 Glasses of wine, 1 Cans of beer per week     Comment: occasional                                Counseling given: Not Answered      Vital Signs (Current):   Vitals:    12/28/20 0946   Weight: 219 lb (99.3 kg)   Height: 4' 11\" (1.499 m)                                              BP Readings from Last 3 Encounters:   12/16/20 132/87   11/18/20 105/64   11/03/20 (!) 142/88       NPO Status: Time of last liquid consumption: 1800                        Time of last solid consumption: 2000                        Date of last liquid consumption: 12/29/20                        Date of last solid food consumption: 12/29/20    BMI:   Wt Readings from Last 3 Encounters:   12/28/20 219 lb (99.3 kg)   12/15/20 219 lb (99.3 kg)   11/03/20 219 lb (99.3 kg)     Body mass index is 44.23 kg/m². CBC:   Lab Results   Component Value Date    WBC 5.5 11/18/2020    RBC 5.49 11/18/2020    HGB 14.5 11/18/2020    HCT 45.3 11/18/2020    MCV 82.5 11/18/2020    RDW 14.7 06/15/2016     11/18/2020       CMP:   Lab Results   Component Value Date     11/18/2020    K 3.3 11/18/2020     11/18/2020    CO2 25 11/18/2020    BUN 8 11/18/2020    CREATININE 0.8 11/18/2020    GFRAA >60 08/09/2017    LABGLOM 73 11/18/2020    GLUCOSE 107 11/18/2020    GLUCOSE 97 07/08/2011    PROT 7.2 11/18/2020    CALCIUM 8.9 11/18/2020    BILITOT 0.4 11/18/2020    ALKPHOS 86 11/18/2020    AST 34 11/18/2020    ALT 16 11/18/2020       POC Tests: No results for input(s): POCGLU, POCNA, POCK, POCCL, POCBUN, POCHEMO, POCHCT in the last 72 hours.     Coags: No results found for: PROTIME, INR, APTT    HCG (If Applicable):   Lab Results   Component Value Date    PREGTESTUR NEGATIVE 11/05/2020        ABGs: No results found for: PHART, PO2ART, XNX5SXF, GBN8LAT, BEART, F2ISABUH     Type & Screen (If Applicable):  No results found for: LABABO, LABRH    Drug/Infectious Status (If Applicable):  Lab Results Component Value Date    HEPCAB Negative 10/24/2019       COVID-19 Screening (If Applicable):   Lab Results   Component Value Date    COVID19 Detected 11/18/2020         Anesthesia Evaluation    Airway: Mallampati: II  TM distance: >3 FB   Neck ROM: full  Mouth opening: > = 3 FB Dental:          Pulmonary: breath sounds clear to auscultation                             Cardiovascular:    (+) hypertension:,         Rhythm: regular                      Neuro/Psych:               GI/Hepatic/Renal:   (+) morbid obesity          Endo/Other:                     Abdominal:   (+) obese,         Vascular:                                        Anesthesia Plan      general     ASA 2       Induction: intravenous. MIPS: Postoperative opioids intended and Prophylactic antiemetics administered. Anesthetic plan and risks discussed with patient. Plan discussed with CRNA.                   Deborah Barros MD   12/30/2020

## 2020-12-30 NOTE — OP NOTE
Gyn Service    Operative Report      Pt Name: Cathrine Dandy  MRN: [id number] Acct #: [de-identified]  YOB: 1960  Procedure Performed By: Bere Aranda Dr, M.D. Pre-operative Diagnosis: Thickened endometrium, PMB    Post-operative Diagnosis:  SAME    Procedure:  Hysterosocopy with dilation and curettage    Surgeon:  Bere Aranda Dr, MD  Assistant: Leigh Escobedo. Olegario Sharp MD    Anesthesia:  General    Estimated blood loss:  <50cc    Findings: Sounding length 8cm, Hypervascularity with prominent tissue in the uterine cavity    Complications:  NONE      Patient was taken to the operative room where general anesthesia was achieved without difficulty. She was then prepped and draped in the dorsal lithotomy position. The bladder was drained prior to procedure. Weighted speculum was placed into the vagina and the anterior lip of the cervix was grasped with a teneculum. The uterus was sounded. Then the Cervix was serially dilated with the use of roger dilators. The hysteroscope was introduced with lactated ringer's as a distending medium and findings as noted above. A 4 quadrant Sharp curettage was performed and tissue was sent to pathology for permanent evaluation. Hemostasis was acchieved. All instruments were removed from the vagina. Sponge, instrument and needle counts were correct. The patient was awakened from anesthesia and taken to the recovery room in stable condition.

## 2020-12-30 NOTE — BRIEF OP NOTE
Brief Operative Report      Pre-operative Diagnosis: Thickened endometrium, PMB, KARI pap    Post-operative Diagnosis:  Same    Procedure:  Hysteroscopy, D&C    Surgeon: TRACY Beck MD     Anesthesia:  General endotrachial anesthesia    Estimated blood loss:  Less than 50 ml     Findings: See Operative Dictation,     Complications:  none      See dictated operative report for full details.       [address] [place] [address]

## 2021-03-23 ENCOUNTER — TELEPHONE (OUTPATIENT)
Dept: FAMILY MEDICINE CLINIC | Age: 61
End: 2021-03-23

## 2021-04-16 ENCOUNTER — CLINICAL DOCUMENTATION (OUTPATIENT)
Dept: CASE MANAGEMENT | Age: 61
End: 2021-04-16

## 2021-04-16 ENCOUNTER — HOSPITAL ENCOUNTER (OUTPATIENT)
Dept: RADIATION ONCOLOGY | Age: 61
Discharge: HOME | End: 2021-04-16
Payer: MEDICARE

## 2021-04-16 VITALS
HEART RATE: 88 BPM | TEMPERATURE: 97.3 F | HEIGHT: 62 IN | OXYGEN SATURATION: 96 % | BODY MASS INDEX: 37.8 KG/M2 | DIASTOLIC BLOOD PRESSURE: 98 MMHG | SYSTOLIC BLOOD PRESSURE: 125 MMHG | RESPIRATION RATE: 18 BRPM | WEIGHT: 205.4 LBS

## 2021-04-16 DIAGNOSIS — C54.1 ENDOMETRIAL CANCER (HCC): ICD-10-CM

## 2021-04-16 PROCEDURE — 99204 OFFICE O/P NEW MOD 45 MIN: CPT | Performed by: RADIOLOGY

## 2021-04-16 PROCEDURE — 99202 OFFICE O/P NEW SF 15 MIN: CPT | Performed by: RADIOLOGY

## 2021-04-16 ASSESSMENT — PAIN DESCRIPTION - PROGRESSION: CLINICAL_PROGRESSION: NOT CHANGED

## 2021-04-16 ASSESSMENT — PAIN DESCRIPTION - ORIENTATION: ORIENTATION: LOWER

## 2021-04-16 ASSESSMENT — PAIN DESCRIPTION - PAIN TYPE: TYPE: SURGICAL PAIN

## 2021-04-16 ASSESSMENT — PAIN DESCRIPTION - DESCRIPTORS: DESCRIPTORS: ACHING;DISCOMFORT

## 2021-04-16 NOTE — PROGRESS NOTES
Name: Negrita Zavala  : 1960  MRN: [id number]    Oncology Navigation- Initial Note:    Intake-  Contact Type: Radiation Oncology    Diagnosis: Endometrial cancer    Home Disposition: Lives alone; pt has friend she refers to as \"Spiritual Mother\", name is Alicia. -Independent in care  -[address] in apt  -Previous employment as [employer] with [employer]   -[address]- pt's friend, Zoraida Willoughby, can transport for some of pt's appts. Will require SW assistance for transportation. Patient needs and barriers to care: Coordination of Care, Knowledge deficit, Emotional Issues/ Fear/ Anxiety, Low Health Literacy, Symptom Management and Transportation     Referral Source: Outpatient    Receptive to Advanced Care Planning/ Palliative Care:  deferred    Interventions-   General Interventions: Navigation program discussed; welcome folder provided, including contact information. Education/Screenings:  yes -   Rad Onc Review of POC. Plan is for 25 EBRT followed by 3 Brachytherapy. CT SIM & TEACHING scheduled on  at 0900     Referrals: Supportive Therapies    SW notification for transportation- email sent. Some DATES pt will need Transportation if any appts are scheduled:  , , 5/3, , , , , 6/3, 6/10, , , , . Continuum of Care: Diagnosis/Active Treatment    Notes: Jamie available to assist & support as needed.     Electronically signed by Irineo Pop RN on 2021 at 3:02 PM

## 2021-04-16 NOTE — PROGRESS NOTES
to ask questions, and indicated that her questions were satisfactorily addressed. She also indicated that she understood the discussion, and wishes to proceed with the recommended treatment at the appropriate time, allowing about a month for recuperation after her surgery. PLAN:  1. Schedule CT simulation with plan to initiate radiation therapy approximately 1 month after surgery. 2. Schedule nursing teaching  3. Continue care in gynecologic oncology and with other physicians/providers  4. Continue basic/preventive/supportive health care in accordance with clinical practice guidelines        HISTORY OF PRESENT ILLNESS:   Mike Salazar is an extremely nice 61-year-old woman who presented with vaginal bleeding. The bleeding was concerning, due to the patient age, and she also was found on PAP to have KARI. Cervix biopsy with fractional D&C was completed 12/17/2020. There were focal atypical glands seen in the endocervical curettage, and the endometrial biopsy showed extensive stromal breakdown with glandular crowding and squamous morules, additional evaluation was recommended, and endometrial biopsy was performed 12/30/2020. The biopsy confirmed the presence of endometrial adenocarcinoma. Fernando Marks underwent gynecologic oncology evaluation. In accordance with clinical practice guidelines, she received a recommendation for definitive surgery. On 3/26/2021, she underwent surgical staging. Final pathology showed FIGO grade 2 endometrioid adenocarcinoma invading through 57% of the myometrial thickness. Cervical stromal involvement was present, but there was no mention of any lymphatic/vascular space involvement. The closest surgical margin was at the vaginal cuff, measuring 5 mm. During her gynecologic oncology follow-up, the pathology was reviewed with Fernando Kendrick.   She received a recommendation for adjuvant radiation therapy to the pelvis with vaginal cuff brachytherapy boost.  Fernando Marks is being seen today 4/16/2021 for further evaluation and discussion regarding the role of radiation therapy in the management of her stage II uterine cancer. Past Medical History:   Diagnosis Date    Anemia     Infertility female     Obesity     Pap smear, as part of routine gynecological examination yrs    Visit for screening mammogram yrs        Past Surgical History:   Procedure Laterality Date    DILATION AND CURETTAGE OF UTERUS N/A 2020    DILATATION AND CURETTAGE HYSTEROSCOPY performed by Dorothy Saleh MD at [address] [place] [address]      ENDOMETRIAL BIOPSY  2021    HYSTERECTOMY  2021       Family History   Problem Relation Age of Onset    Heart Disease Mother     Cancer Father         pancreatic    Cancer Brother         stomach    Glaucoma Neg Hx        Social History     Socioeconomic History    Marital status: Single     Spouse name: Not on file    Number of children: Not on file    Years of education: Not on file    Highest education level: Not on file   Occupational History    Not on file   Social Needs    Financial resource strain: Somewhat hard    Food insecurity     Worry: Never true     Inability: Never true    Transportation needs     Medical: No     Non-medical: No   Tobacco Use    Smoking status: Former Smoker     Packs/day: 0.00     Years: 5.00     Pack years: 0.00     Types: Cigars     Quit date: 2011     Years since quittin.9    Smokeless tobacco: Never Used   Substance and Sexual Activity    Alcohol use:  Yes     Alcohol/week: 2.0 standard drinks     Types: 1 Glasses of wine, 1 Cans of beer per week     Comment: occasional    Drug use: No    Sexual activity: Yes     Partners: Male   Lifestyle    Physical activity     Days per week: Not on file     Minutes per session: Not on file    Stress: Not on file   Relationships    Social connections     Talks on phone: Not on file     Gets together: Not on file     Attends [employer] service: Not on file     Active member of club or organization: Not on file     Attends meetings of clubs or organizations: Not on file     Relationship status: Not on file    Intimate partner violence     Fear of current or ex partner: Not on file     Emotionally abused: Not on file     Physically abused: Not on file     Forced sexual activity: Not on file   Other Topics Concern    Not on file   Social History Narrative    Not on file     Exposure to Industrial/ environmental Carcinogens: no      ALLERGIES: No Known Allergies       Current Outpatient Medications   Medication Sig Dispense Refill    cyclobenzaprine (FLEXERIL) 10 MG tablet Take 10 mg by mouth 3 times daily as needed for Muscle spasms      naproxen (NAPROSYN) 500 MG tablet Take 1 tablet by mouth 2 times daily as needed for Pain 20 tablet 0    albuterol sulfate HFA (VENTOLIN HFA) 108 (90 Base) MCG/ACT inhaler Inhale 2 puffs into the lungs 4 times daily 1 Inhaler 0    oxybutynin (DITROPAN) 5 MG tablet Take 1 tablet by mouth 2 times daily 60 tablet 5     No current facility-administered medications for this encounter.       No outpatient medications have been marked as taking for the 4/16/21 encounter [facility] Encounter) with Celsa Uribe MD.          LABORATORY STUDIES:    CBC:   Lab Results   Component Value Date    WBC 5.5 11/18/2020    RBC 5.49 11/18/2020    HGB 14.5 11/18/2020    HCT 45.3 11/18/2020    MCV 82.5 11/18/2020    MCH 26.4 11/18/2020    MCHC 32.0 11/18/2020    RDW 14.7 06/15/2016     11/18/2020    MPV 10.7 51/82/1334     MetabolicPanel:  Lab Results   Component Value Date     11/18/2020    K 3.3 11/18/2020     11/18/2020    CO2 25 11/18/2020    BUN 8 11/18/2020    CREATININE 0.8 11/18/2020    GFRAA >60 08/09/2017    LABGLOM 73 11/18/2020    GLUCOSE 107 11/18/2020    GLUCOSE 97 07/08/2011    PROT 7.2 11/18/2020    LABALBU 3.3 11/18/2020    CALCIUM 8.9 11/18/2020    BILITOT 0.4 11/18/2020    ALKPHOS 86 11/18/2020    AST 34 11/18/2020    ALT 16 11/18/2020     Onc labs:   Lab Results   Component Value Date     07/22/2011         PATHOLOGY:   12/17/2020: surgical pathology:  FINAL DIAGNOSIS:   A.  Cervix, biopsy:    Mild squamous atypia. B.  Endocervix, curettage:    Focal atypical glands. C.  Endometrium, biopsy:    Extensive stromal breakdown with glandular crowding and squamous   morules.    Suggest additional tissue.  See microscopic     Microscopic Examination:     A.  The biopsy a bit of squamous epithelium with slight nuclear   atypia noted by perinuclear halo formation and mild nuclear   irregularities.  This bit of tissue is also acutely inflamed.  The   possibility of a mild squamous dysplasia cannot be entirely excluded. This patient has had a previous Pap smear diagnosed as KARI. B.  Micro-bits of endocervical mucosa present.  A portion of the tissue   also show glands suspicious for endometrial type with nuclear   hyperchromasia, associate with inflammation but no mitotic activity. Architecturally these fragmented are distorted.  In addition separate   bits of benign squamous epithelium are present.  A p16 immunostain* is   performed and demonstrate moderate patchy staining, which is   non-contributory.  The control is adequate. C. Multiple hemorrhagic segments of tissue with squamous morule   formation are present. Ulyess Mings are fragmented segments demonstrate focal   glandular crowding, however this may be compounded due to the   artifactual endometrial breakdown.   It is difficult to fully assess   the gland to stroma ratio.  Occasional atypical nuclei are also   present.  Squamous morule formation is unusual and repeat biopsy and/or   curettage should be performed to help exclude underlying endometrial   neoplasia. This case was discussed with Dr. Florecita Reno. 12/30/2020: surgical pathology:  FINAL DIAGNOSIS:   Endometrium, biopsy:       Endometrial adenocarcinoma, please see microscopic description.

## 2021-04-16 NOTE — PROGRESS NOTES
Ava Snare  4/16/2021    Chaperone: No    Advance Directives     Power of  Living Will ACP-Advance Directive ACP-Power of     Not on File Not on File Not on File Not on File          Temp Readings from Last 2 Encounters:   04/16/21 97.3 °F (36.3 °C) (Infrared)   12/30/20 96.8 °F (36 °C) (Temporal)     BP Readings from Last 2 Encounters:   04/16/21 (!) 125/98   12/30/20 136/80     Pulse Readings from Last 2 Encounters:   04/16/21 88   12/30/20 70        Wt Readings from Last 3 Encounters:   04/16/21 205 lb 6.4 oz (93.2 kg)   12/30/20 216 lb 12.8 oz (98.3 kg)   12/15/20 219 lb (99.3 kg)      Lab Results   Component Value Date    CREATININE 0.8 11/18/2020     Lab Results   Component Value Date    BUN 8 11/18/2020       Mediport: no    Pacemaker/ICD: no    Previous XRT: no    Past Medical History:   Diagnosis Date    Anemia     Infertility female     Obesity     Pap smear, as part of routine gynecological examination yrs    Visit for screening mammogram yrs     Past Surgical History:   Procedure Laterality Date    DILATION AND CURETTAGE OF UTERUS N/A 12/30/2020    DILATATION AND CURETTAGE HYSTEROSCOPY performed by Dorothy Saleh MD at [address] [place] [address]  02/2021    HYSTERECTOMY  03/13/2021       No Known Allergies       Current Outpatient Medications:     cyclobenzaprine (FLEXERIL) 10 MG tablet, Take 10 mg by mouth 3 times daily as needed for Muscle spasms, Disp: , Rfl:     naproxen (NAPROSYN) 500 MG tablet, Take 1 tablet by mouth 2 times daily as needed for Pain, Disp: 20 tablet, Rfl: 0    albuterol sulfate HFA (VENTOLIN HFA) 108 (90 Base) MCG/ACT inhaler, Inhale 2 puffs into the lungs 4 times daily, Disp: 1 Inhaler, Rfl: 0    oxybutynin (DITROPAN) 5 MG tablet, Take 1 tablet by mouth 2 times daily, Disp: 60 tablet, Rfl: 5      ADDITIONAL COMMENTS: Seen in consultation with Dr. Renato Ross today.        [place] Chris BSN, RN

## 2021-05-05 ENCOUNTER — HOSPITAL ENCOUNTER (OUTPATIENT)
Dept: RADIATION ONCOLOGY | Age: 61
Discharge: HOME | End: 2021-05-05
Attending: RADIOLOGY
Payer: MEDICARE

## 2021-05-05 ENCOUNTER — HOSPITAL ENCOUNTER (OUTPATIENT)
Dept: RADIATION ONCOLOGY | Age: 61
Discharge: HOME | End: 2021-05-05
Payer: MEDICARE

## 2021-05-05 ENCOUNTER — HOSPITAL ENCOUNTER (OUTPATIENT)
Dept: CT IMAGING | Age: 61
Discharge: HOME | End: 2021-05-05
Payer: MEDICARE

## 2021-05-05 DIAGNOSIS — C54.1 MALIGNANT NEOPLASM OF ENDOMETRIUM (HCC): ICD-10-CM

## 2021-05-05 PROCEDURE — 99214 OFFICE O/P EST MOD 30 MIN: CPT | Performed by: NURSE PRACTITIONER

## 2021-05-05 PROCEDURE — 77290 THER RAD SIMULAJ FIELD CPLX: CPT | Performed by: RADIOLOGY

## 2021-05-05 PROCEDURE — 77334 RADIATION TREATMENT AID(S): CPT | Performed by: RADIOLOGY

## 2021-05-05 PROCEDURE — 77263 THER RADIOLOGY TX PLNG CPLX: CPT | Performed by: RADIOLOGY

## 2021-05-05 PROCEDURE — 3209999900 CT GUIDE RADIATION THERAPY NO CHARGE

## 2021-05-05 NOTE — PROGRESS NOTES
[address], [address]  Phone: [phone]   Toll Free: [phone]   Fax: [phone]    RADIATION ONCOLOGY EDUCATION    CHIEF COMPLAINT: Dorene Swift presents to radiation oncology today for education prior to starting radiation treatment to pelvis (endometrial cancer). HISTORY OF PRESENT ILLNESS: Dorene Swift was diagnosed with Endometrial cancer in December 2020. She underwent total hysterectomy in March 2021. She received a recommendation for adjuvant radiation therapy for which she is agreeable. EXAMINATION:   CONSTITUTIONAL: Dorene Swift is a pleasant well developed adult female. NEUROLOGICAL: She is alert and oriented x3 in no acute distress. Clear mentation. MOOD/AFFECT: Appropriate for place and situation. PLAN:   1. Discussed with patient the steps involved with set up and initiation of radiation therapy (including treatment simulation and verification). Also reviewed the logistics of treatment (day, time and number of treatments). Expected and potential side effects (both short and long-term) were discussed in detail. 2. Skin care and moisturization instructions were discussed in detail. 3. Patient was agreeable to PT, dietician referrals which will be placed once treatment is initiated. [address] [place] [address] Street had the opportunity to ask questions, and indicated that her questions were satisfactorily addressed. ATTESTATION:  I have spent 60 minutes reviewing previous notes, test results and face to face with the patient discussing the diagnosis and importance of compliance with the treatment plan as well as documenting on the day of the visit.

## 2021-05-12 ENCOUNTER — HOSPITAL ENCOUNTER (OUTPATIENT)
Dept: RADIATION ONCOLOGY | Age: 61
End: 2021-05-12
Attending: RADIOLOGY
Payer: MEDICARE

## 2021-05-12 PROCEDURE — 77295 3-D RADIOTHERAPY PLAN: CPT | Performed by: RADIOLOGY

## 2021-05-12 PROCEDURE — 77334 RADIATION TREATMENT AID(S): CPT | Performed by: RADIOLOGY

## 2021-05-12 PROCEDURE — 77300 RADIATION THERAPY DOSE PLAN: CPT | Performed by: RADIOLOGY

## 2021-05-18 ENCOUNTER — HOSPITAL ENCOUNTER (OUTPATIENT)
Dept: RADIATION ONCOLOGY | Age: 61
Discharge: HOME | End: 2021-05-18
Attending: RADIOLOGY
Payer: MEDICARE

## 2021-05-18 DIAGNOSIS — C54.1 ENDOMETRIAL CANCER (HCC): Primary | ICD-10-CM

## 2021-05-18 PROCEDURE — 77280 THER RAD SIMULAJ FIELD SMPL: CPT | Performed by: RADIOLOGY

## 2021-05-18 PROCEDURE — 77412 RADIATION TX DELIVERY LVL 3: CPT | Performed by: RADIOLOGY

## 2021-05-19 ENCOUNTER — HOSPITAL ENCOUNTER (OUTPATIENT)
Dept: RADIATION ONCOLOGY | Age: 61
Discharge: HOME | End: 2021-05-19
Attending: RADIOLOGY
Payer: MEDICARE

## 2021-05-19 PROCEDURE — 77412 RADIATION TX DELIVERY LVL 3: CPT | Performed by: RADIOLOGY

## 2021-05-19 PROCEDURE — 77387 GUIDANCE FOR RADJ TX DLVR: CPT | Performed by: RADIOLOGY

## 2021-05-20 ENCOUNTER — HOSPITAL ENCOUNTER (OUTPATIENT)
Age: 61
Discharge: HOME | End: 2021-05-20
Payer: MEDICARE

## 2021-05-20 ENCOUNTER — HOSPITAL ENCOUNTER (OUTPATIENT)
Dept: RADIATION ONCOLOGY | Age: 61
Discharge: HOME | End: 2021-05-20
Attending: RADIOLOGY
Payer: MEDICARE

## 2021-05-20 LAB
BASOPHILS # BLD: 0.2 %
BASOPHILS ABSOLUTE: 0 THOU/MM3 (ref 0–0.1)
EOSINOPHIL # BLD: 1.5 %
EOSINOPHILS ABSOLUTE: 0.1 THOU/MM3 (ref 0–0.4)
ERYTHROCYTE [DISTWIDTH] IN BLOOD BY AUTOMATED COUNT: 15.7 % (ref 11.5–14.5)
ERYTHROCYTE [DISTWIDTH] IN BLOOD BY AUTOMATED COUNT: 46.5 FL (ref 35–45)
HCT VFR BLD CALC: 45.8 % (ref 37–47)
HEMOGLOBIN: 13.6 GM/DL (ref 12–16)
IMMATURE GRANS (ABS): 0 THOU/MM3 (ref 0–0.07)
IMMATURE GRANULOCYTES: 0 %
LYMPHOCYTES # BLD: 39.7 %
LYMPHOCYTES ABSOLUTE: 1.8 THOU/MM3 (ref 1–4.8)
MCH RBC QN AUTO: 25 PG (ref 26–33)
MCHC RBC AUTO-ENTMCNC: 29.7 GM/DL (ref 32.2–35.5)
MCV RBC AUTO: 84 FL (ref 81–99)
MONOCYTES # BLD: 7.1 %
MONOCYTES ABSOLUTE: 0.3 THOU/MM3 (ref 0.4–1.3)
NUCLEATED RED BLOOD CELLS: 0 /100 WBC
PLATELET # BLD: 257 THOU/MM3 (ref 130–400)
PMV BLD AUTO: 10.1 FL (ref 9.4–12.4)
RBC # BLD: 5.45 MILL/MM3 (ref 4.2–5.4)
SEG NEUTROPHILS: 51.5 %
SEGMENTED NEUTROPHILS ABSOLUTE COUNT: 2.4 THOU/MM3 (ref 1.8–7.7)
WBC # BLD: 4.6 THOU/MM3 (ref 4.8–10.8)

## 2021-05-20 PROCEDURE — 85025 COMPLETE CBC W/AUTO DIFF WBC: CPT

## 2021-05-20 PROCEDURE — 77387 GUIDANCE FOR RADJ TX DLVR: CPT | Performed by: RADIOLOGY

## 2021-05-20 PROCEDURE — 77412 RADIATION TX DELIVERY LVL 3: CPT | Performed by: RADIOLOGY

## 2021-05-20 PROCEDURE — 36415 COLL VENOUS BLD VENIPUNCTURE: CPT

## 2021-05-21 ENCOUNTER — HOSPITAL ENCOUNTER (OUTPATIENT)
Dept: RADIATION ONCOLOGY | Age: 61
Discharge: HOME | End: 2021-05-21
Attending: RADIOLOGY
Payer: MEDICARE

## 2021-05-21 PROCEDURE — 77387 GUIDANCE FOR RADJ TX DLVR: CPT | Performed by: RADIOLOGY

## 2021-05-21 PROCEDURE — 77412 RADIATION TX DELIVERY LVL 3: CPT | Performed by: RADIOLOGY

## 2021-05-24 ENCOUNTER — HOSPITAL ENCOUNTER (OUTPATIENT)
Dept: RADIATION ONCOLOGY | Age: 61
Discharge: HOME | End: 2021-05-24
Attending: RADIOLOGY
Payer: MEDICARE

## 2021-05-24 PROCEDURE — 77387 GUIDANCE FOR RADJ TX DLVR: CPT | Performed by: RADIOLOGY

## 2021-05-24 PROCEDURE — 77427 RADIATION TX MANAGEMENT X5: CPT | Performed by: RADIOLOGY

## 2021-05-24 PROCEDURE — 77336 RADIATION PHYSICS CONSULT: CPT | Performed by: RADIOLOGY

## 2021-05-24 PROCEDURE — 77412 RADIATION TX DELIVERY LVL 3: CPT | Performed by: RADIOLOGY

## 2021-05-25 ENCOUNTER — HOSPITAL ENCOUNTER (OUTPATIENT)
Dept: RADIATION ONCOLOGY | Age: 61
Discharge: HOME | End: 2021-05-25
Attending: RADIOLOGY
Payer: MEDICARE

## 2021-05-25 DIAGNOSIS — C54.1 ENDOMETRIAL CANCER (HCC): Primary | ICD-10-CM

## 2021-05-25 PROCEDURE — 77387 GUIDANCE FOR RADJ TX DLVR: CPT | Performed by: RADIOLOGY

## 2021-05-25 PROCEDURE — 77412 RADIATION TX DELIVERY LVL 3: CPT | Performed by: RADIOLOGY

## 2021-05-26 ENCOUNTER — HOSPITAL ENCOUNTER (OUTPATIENT)
Dept: RADIATION ONCOLOGY | Age: 61
Discharge: HOME | End: 2021-05-26
Attending: RADIOLOGY
Payer: MEDICARE

## 2021-05-26 PROCEDURE — 77387 GUIDANCE FOR RADJ TX DLVR: CPT | Performed by: RADIOLOGY

## 2021-05-26 PROCEDURE — 77412 RADIATION TX DELIVERY LVL 3: CPT | Performed by: RADIOLOGY

## 2021-05-27 ENCOUNTER — HOSPITAL ENCOUNTER (OUTPATIENT)
Dept: RADIATION ONCOLOGY | Age: 61
Discharge: HOME | End: 2021-05-27
Attending: RADIOLOGY
Payer: MEDICARE

## 2021-05-27 PROCEDURE — 77412 RADIATION TX DELIVERY LVL 3: CPT | Performed by: RADIOLOGY

## 2021-05-27 PROCEDURE — 77387 GUIDANCE FOR RADJ TX DLVR: CPT | Performed by: RADIOLOGY

## 2021-05-28 ENCOUNTER — HOSPITAL ENCOUNTER (OUTPATIENT)
Dept: RADIATION ONCOLOGY | Age: 61
Discharge: HOME | End: 2021-05-28
Attending: RADIOLOGY
Payer: MEDICARE

## 2021-05-28 PROCEDURE — 77412 RADIATION TX DELIVERY LVL 3: CPT | Performed by: RADIOLOGY

## 2021-05-28 PROCEDURE — 77387 GUIDANCE FOR RADJ TX DLVR: CPT | Performed by: RADIOLOGY

## 2021-06-01 ENCOUNTER — HOSPITAL ENCOUNTER (OUTPATIENT)
Dept: RADIATION ONCOLOGY | Age: 61
Discharge: HOME | End: 2021-06-01
Attending: RADIOLOGY
Payer: MEDICARE

## 2021-06-01 PROCEDURE — 77336 RADIATION PHYSICS CONSULT: CPT | Performed by: RADIOLOGY

## 2021-06-01 PROCEDURE — 77427 RADIATION TX MANAGEMENT X5: CPT | Performed by: RADIOLOGY

## 2021-06-01 PROCEDURE — 77412 RADIATION TX DELIVERY LVL 3: CPT | Performed by: RADIOLOGY

## 2021-06-01 PROCEDURE — 77387 GUIDANCE FOR RADJ TX DLVR: CPT | Performed by: RADIOLOGY

## 2021-06-02 ENCOUNTER — CLINICAL DOCUMENTATION (OUTPATIENT)
Dept: NUTRITION | Age: 61
End: 2021-06-02

## 2021-06-02 ENCOUNTER — HOSPITAL ENCOUNTER (OUTPATIENT)
Dept: RADIATION ONCOLOGY | Age: 61
Discharge: HOME | End: 2021-06-02
Attending: RADIOLOGY
Payer: MEDICARE

## 2021-06-02 PROCEDURE — 77387 GUIDANCE FOR RADJ TX DLVR: CPT | Performed by: RADIOLOGY

## 2021-06-02 PROCEDURE — 77412 RADIATION TX DELIVERY LVL 3: CPT | Performed by: RADIOLOGY

## 2021-06-03 ENCOUNTER — HOSPITAL ENCOUNTER (OUTPATIENT)
Dept: RADIATION ONCOLOGY | Age: 61
End: 2021-06-03
Attending: RADIOLOGY
Payer: MEDICARE

## 2021-06-03 ENCOUNTER — CLINICAL DOCUMENTATION (OUTPATIENT)
Dept: RADIATION ONCOLOGY | Age: 61
End: 2021-06-03

## 2021-06-03 NOTE — PROGRESS NOTES
Scheduled [facility] for patient, starting tomorrow and daily throughout next week for radiation appointments.

## 2021-06-04 ENCOUNTER — HOSPITAL ENCOUNTER (OUTPATIENT)
Dept: RADIATION ONCOLOGY | Age: 61
Discharge: HOME | End: 2021-06-04
Attending: RADIOLOGY
Payer: MEDICARE

## 2021-06-04 PROCEDURE — 77412 RADIATION TX DELIVERY LVL 3: CPT | Performed by: RADIOLOGY

## 2021-06-04 PROCEDURE — 77387 GUIDANCE FOR RADJ TX DLVR: CPT | Performed by: RADIOLOGY

## 2021-06-07 ENCOUNTER — HOSPITAL ENCOUNTER (OUTPATIENT)
Dept: RADIATION ONCOLOGY | Age: 61
Discharge: HOME | End: 2021-06-07
Attending: RADIOLOGY
Payer: MEDICARE

## 2021-06-07 PROCEDURE — 77336 RADIATION PHYSICS CONSULT: CPT | Performed by: RADIOLOGY

## 2021-06-07 PROCEDURE — 77387 GUIDANCE FOR RADJ TX DLVR: CPT | Performed by: RADIOLOGY

## 2021-06-07 PROCEDURE — 77412 RADIATION TX DELIVERY LVL 3: CPT | Performed by: RADIOLOGY

## 2021-06-08 ENCOUNTER — HOSPITAL ENCOUNTER (OUTPATIENT)
Dept: RADIATION ONCOLOGY | Age: 61
Discharge: HOME | End: 2021-06-08
Attending: RADIOLOGY
Payer: MEDICARE

## 2021-06-08 PROCEDURE — 77387 GUIDANCE FOR RADJ TX DLVR: CPT | Performed by: RADIOLOGY

## 2021-06-08 PROCEDURE — 77427 RADIATION TX MANAGEMENT X5: CPT | Performed by: RADIOLOGY

## 2021-06-08 PROCEDURE — 77412 RADIATION TX DELIVERY LVL 3: CPT | Performed by: RADIOLOGY

## 2021-06-09 ENCOUNTER — HOSPITAL ENCOUNTER (OUTPATIENT)
Age: 61
Discharge: HOME | End: 2021-06-09
Payer: MEDICARE

## 2021-06-09 ENCOUNTER — HOSPITAL ENCOUNTER (OUTPATIENT)
Dept: RADIATION ONCOLOGY | Age: 61
Discharge: HOME | End: 2021-06-09
Attending: RADIOLOGY
Payer: MEDICARE

## 2021-06-09 ENCOUNTER — CLINICAL DOCUMENTATION (OUTPATIENT)
Dept: CASE MANAGEMENT | Age: 61
End: 2021-06-09

## 2021-06-09 DIAGNOSIS — C54.1 ENDOMETRIAL CANCER (HCC): ICD-10-CM

## 2021-06-09 LAB
BASOPHILS # BLD: 0.4 %
BASOPHILS ABSOLUTE: 0 THOU/MM3 (ref 0–0.1)
EOSINOPHIL # BLD: 2 %
EOSINOPHILS ABSOLUTE: 0.1 THOU/MM3 (ref 0–0.4)
ERYTHROCYTE [DISTWIDTH] IN BLOOD BY AUTOMATED COUNT: 16.1 % (ref 11.5–14.5)
ERYTHROCYTE [DISTWIDTH] IN BLOOD BY AUTOMATED COUNT: 49.3 FL (ref 35–45)
HCT VFR BLD CALC: 44.7 % (ref 37–47)
HEMOGLOBIN: 13.4 GM/DL (ref 12–16)
IMMATURE GRANS (ABS): 0.02 THOU/MM3 (ref 0–0.07)
IMMATURE GRANULOCYTES: 0.4 %
LYMPHOCYTES # BLD: 17.5 %
LYMPHOCYTES ABSOLUTE: 0.8 THOU/MM3 (ref 1–4.8)
MCH RBC QN AUTO: 25.3 PG (ref 26–33)
MCHC RBC AUTO-ENTMCNC: 30 GM/DL (ref 32.2–35.5)
MCV RBC AUTO: 84.3 FL (ref 81–99)
MONOCYTES # BLD: 11.3 %
MONOCYTES ABSOLUTE: 0.5 THOU/MM3 (ref 0.4–1.3)
NUCLEATED RED BLOOD CELLS: 0 /100 WBC
PLATELET # BLD: 165 THOU/MM3 (ref 130–400)
PMV BLD AUTO: 10.9 FL (ref 9.4–12.4)
RBC # BLD: 5.3 MILL/MM3 (ref 4.2–5.4)
SEG NEUTROPHILS: 68.4 %
SEGMENTED NEUTROPHILS ABSOLUTE COUNT: 3.1 THOU/MM3 (ref 1.8–7.7)
WBC # BLD: 4.5 THOU/MM3 (ref 4.8–10.8)

## 2021-06-09 PROCEDURE — 85025 COMPLETE CBC W/AUTO DIFF WBC: CPT

## 2021-06-09 PROCEDURE — 77412 RADIATION TX DELIVERY LVL 3: CPT | Performed by: RADIOLOGY

## 2021-06-09 PROCEDURE — 77387 GUIDANCE FOR RADJ TX DLVR: CPT | Performed by: RADIOLOGY

## 2021-06-09 PROCEDURE — 36415 COLL VENOUS BLD VENIPUNCTURE: CPT

## 2021-06-10 ENCOUNTER — HOSPITAL ENCOUNTER (OUTPATIENT)
Dept: RADIATION ONCOLOGY | Age: 61
Discharge: HOME | End: 2021-06-10
Attending: RADIOLOGY
Payer: MEDICARE

## 2021-06-10 PROCEDURE — 77412 RADIATION TX DELIVERY LVL 3: CPT | Performed by: RADIOLOGY

## 2021-06-10 PROCEDURE — 77387 GUIDANCE FOR RADJ TX DLVR: CPT | Performed by: RADIOLOGY

## 2021-06-11 ENCOUNTER — HOSPITAL ENCOUNTER (OUTPATIENT)
Dept: RADIATION ONCOLOGY | Age: 61
Discharge: HOME | End: 2021-06-11
Attending: RADIOLOGY
Payer: MEDICARE

## 2021-06-11 PROCEDURE — 77387 GUIDANCE FOR RADJ TX DLVR: CPT | Performed by: RADIOLOGY

## 2021-06-11 PROCEDURE — 77412 RADIATION TX DELIVERY LVL 3: CPT | Performed by: RADIOLOGY

## 2021-06-14 ENCOUNTER — HOSPITAL ENCOUNTER (OUTPATIENT)
Dept: PHYSICAL THERAPY | Age: 61
Setting detail: THERAPIES SERIES
Discharge: HOME | End: 2021-06-14
Payer: MEDICARE

## 2021-06-14 ENCOUNTER — HOSPITAL ENCOUNTER (OUTPATIENT)
Dept: RADIATION ONCOLOGY | Age: 61
Discharge: HOME | End: 2021-06-14
Attending: RADIOLOGY
Payer: MEDICARE

## 2021-06-14 PROCEDURE — 77412 RADIATION TX DELIVERY LVL 3: CPT | Performed by: RADIOLOGY

## 2021-06-14 PROCEDURE — 97162 PT EVAL MOD COMPLEX 30 MIN: CPT

## 2021-06-14 PROCEDURE — 77387 GUIDANCE FOR RADJ TX DLVR: CPT | Performed by: RADIOLOGY

## 2021-06-14 PROCEDURE — 77336 RADIATION PHYSICS CONSULT: CPT | Performed by: RADIOLOGY

## 2021-06-14 PROCEDURE — 97110 THERAPEUTIC EXERCISES: CPT

## 2021-06-14 NOTE — PROGRESS NOTES
** PLEASE SIGN, DATE AND TIME CERTIFICATION BELOW AND RETURN TO [facility] OUTPATIENT REHABILITATION (FAX #: [phone]). ATTEST/CO-SIGN IF ACCESSING VIA [employer]. THANK YOU.**    I certify that I have examined the patient below and determined that Physical Medicine and Rehabilitation service is necessary and that I approve the established plan of care for up to 90 days or as specifically noted. Attestation, signature or co-signature of physician indicates approval of certification requirements.    ________________________ ____________ __________  Physician Signature   Date   Time  [facility]  PHYSICAL THERAPY  [x] EVALUATION    [x]  [facility]     Date: 2021  Patient Name:  Ermelinda Finn  : 1960  MRN: [id number]      Referring Practitioner NAVEED Pineda*   Diagnosis Lumbosacral plexus disorders [G54.1]    Treatment Diagnosis Chronic low back pain, L shoulder pain, cancer related fatigue, Z71.9   Date of Evaluation 21    Additional Pertinent History Anemia, incontinence, anxiety, arthritis      Functional Outcome Measure Used O: BFI   Functional Outcome Score 6.1 (21)       Insurance: Primary: Payor: Adelaida Calix /  /  / ,   Secondary:    Authorization Information: Aquatics and modalities ok, no ionto, hot or cold packs   Visit # 1, 1/10 for progress note   Visits Allowed: 55/INQXGYOF year   Recertification Date: 52   Physician Follow-Up: Radiation thru  at 8:45   Physician Orders: Fatigue, weakness   History of Present Illness: 20 endrometrial/uterine CA, 3/26/21 robotic hysterectomy without lymph nodes submitted     SUBJECTIVE: Reports started with L sided low back pain since just prior to diagnosis. Notes no improvement with radiation to date with 10/10 sharp, stab-like pain.      Social/Functional History and Current Status:  Medications and Allergies have been reviewed and are listed on Medical History Questionnaire. Mary Hayes lives alone in a single story home with stairs and a handrail to enter.     Task Previous Current   ADLs  Independent Modified Independent   IADL's Independent Modified Independent   Ambulation Independent Independent   Transfers Independent Independent   Recreation Independent Enjoys arts and crafts - paint, ramiro, knit - independent   Community Integration Independent Modified Independent   Driving Does not drive Does not drive   Work Applied for disability  Applied for disability     OBJECTIVE:   Posture: Fair, Guarding  Palpation: No tenderness with palpation  Observation: Guarding, slow moving    Range of Motion/Strength (Range of Motion in degrees)    Right Left Comments   Shoulder Flexion PROM [facility] WFL Pain at L end range   Shoulder ABDuction PROM WFL WFL Pain at L end range   Shoulder Strength 4/5 3+/5    Elbow Strength 4+/5 4+/5    Hip Flexion AROM [facility]    Hip Strength 4+/5 4/5    Knee Extension/Flexion AROM [facility] WF    Knee Strength 4+/5 4+/5    Ankle AROM [facility]    Ankle Strength 4+/5 4+/5 Denies any L foot drop     Circumferential Measurements:   Lower Extremity Circumferential Measurements    Right (cm) Left (cm) Comments   Met Heads 20.5 20.5    [place] 20.2 20.2    Widest part of calf 35.7 36.0    Infrapatellar 39.6 39.5    Suprapatellar 48.7 47.4    Total 164.7 163.6 Trunk at iliac crest:     Brief Fatigue Inventory: 6.1 (0: none, 1-3: mild, 4-6: moderate, 7-10: severe)    Treatment Initiated:     TREATMENT   Precautions: Lymphedema risk   Pain: 10/10 LBP    X in shaded column indicates activity completed today   Modalities Parameters/  Location  Notes         Manual Therapy Time/Technique  Notes         Exercise/Intervention   Notes   Retro shoulder rolls 10  x Seated   Shoulder retractions 10  x    L upper trap stretch 15 sec x3  x    L levator stretch 15 sec x3  x    * Attempted positioning for LBP with R sidelying x3 minutes, hooklying x2, and returned to R sidelying x3 minutes as unable to tolerate hooklying for positioning and PREP    Specific Interventions Next Treatment: PORi protocol for gentle manual lymphatic drainage and myofascial release, gentle stretches, core strengthening, conditioning, L shoulder ROM, posture, lymphedema education as needed    Activity Tolerance: Patient tolerated treatment well. Attempted hooklying with increased LBP and discontinued    ASSESSMENT:  Assessment: Pt presents with high level low back pain and L shoulder pain with increasing fatigue with radiation treatment. She would benefit from skilled PT/Onc Rehab to address these issues and allow for improved ease and independence for quality of survivorship. Body Structures/Functions/Activity Limitations:Decreased affected limb strength, Decreased core stabilization, Decreased overall strength, Decreased tolerance of activities, Decreased affected limb range of motion, Difficulty reaching/carrying/pushing/pulling, Pain and Lymphedema Risk  Prognosis: good    GOALS:  Patient Goal: Improve pain in LB, less fatigue    Short Term Goals to be met in 4 weeks:  1. Pt to demo L shoulder strength improved from 3+/5 to 4/5 for improved ease with carrying laundry. 2. Pt to demo L hip strength improved from 4/5 to 4+/5 for improved ease with sit to stand transfers. 3. Pt demo reaching overhead without increased L shoulder pain for grooming tasks. Long Term Goals to be met in 8 weeks:   1. Pt to demo BFI score improved from 6.1 to 3 for improved tolerance of household tasks. 2. Pt to demo sustained B LE girth with independence of lymphedema risk reduction strategies for safety after discharge. 3. Pt to demo independence with finalized HEP for improved ability to complete IADLs. Patient Education: Plan of care, goals. See \"Treatment Initiated\" for further details. Access Code: [email or web address]  URL: [email or web address]  Date: 06/14/2021  Prepared by:  Amelie Wahlgren    Exercises  Seated Shoulder Rolls - 3 x daily - 7 x weekly - 1 sets - 10 reps  Seated Scapular Retraction - 3 x daily - 7 x weekly - 1 sets - 10 reps  Seated Upper Trapezius Stretch - 3 x daily - 7 x weekly - 1 sets - 3 reps - 15 hold  Gentle Levator Scapulae Stretch - 3 x daily - 7 x weekly - 1 sets - 3 reps - 15 hold    Education Outcome: Verbalized understanding  Education Barriers: None    PLAN:  Treatment Recommendations: Strengthening, Range of Motion, Conditioning, Lymphedema Management, Manual Techniques, Pain Management, Postural Re-Training, Body Mechanics/Ergonomics, Aquatics, Home Exercise Prescription and Safety Education    Plan of care initiated. Plan to see patient up to 2 times per week for 8 weeks to address the treatment planned outlined above.     Time In 0935   Time Out 1025   Timed Code Minutes: 25 min   Total Treatment Time: 50 min       Electronically Signed by: Antoine Faye PT PT, DPT, DALIA [id number] 6/14/2021

## 2021-06-15 ENCOUNTER — HOSPITAL ENCOUNTER (OUTPATIENT)
Dept: RADIATION ONCOLOGY | Age: 61
Discharge: HOME | End: 2021-06-15
Attending: RADIOLOGY
Payer: MEDICARE

## 2021-06-15 PROCEDURE — 77387 GUIDANCE FOR RADJ TX DLVR: CPT | Performed by: RADIOLOGY

## 2021-06-15 PROCEDURE — 77427 RADIATION TX MANAGEMENT X5: CPT | Performed by: RADIOLOGY

## 2021-06-15 PROCEDURE — 77412 RADIATION TX DELIVERY LVL 3: CPT | Performed by: RADIOLOGY

## 2021-06-16 ENCOUNTER — HOSPITAL ENCOUNTER (OUTPATIENT)
Dept: CT IMAGING | Age: 61
Discharge: HOME | End: 2021-06-16
Payer: MEDICARE

## 2021-06-16 ENCOUNTER — HOSPITAL ENCOUNTER (OUTPATIENT)
Dept: RADIATION ONCOLOGY | Age: 61
Discharge: HOME | End: 2021-06-16
Attending: RADIOLOGY
Payer: MEDICARE

## 2021-06-16 DIAGNOSIS — C54.1 MALIGNANT NEOPLASM OF ENDOMETRIUM (HCC): ICD-10-CM

## 2021-06-16 PROCEDURE — 77332 RADIATION TREATMENT AID(S): CPT | Performed by: RADIOLOGY

## 2021-06-16 PROCEDURE — 77290 THER RAD SIMULAJ FIELD CPLX: CPT | Performed by: RADIOLOGY

## 2021-06-16 PROCEDURE — 77387 GUIDANCE FOR RADJ TX DLVR: CPT | Performed by: RADIOLOGY

## 2021-06-16 PROCEDURE — 77412 RADIATION TX DELIVERY LVL 3: CPT | Performed by: RADIOLOGY

## 2021-06-16 PROCEDURE — 3209999900 CT GUIDE RADIATION THERAPY NO CHARGE

## 2021-06-17 ENCOUNTER — APPOINTMENT (OUTPATIENT)
Dept: GENERAL RADIOLOGY | Age: 61
DRG: 247 | End: 2021-06-17
Payer: MEDICARE

## 2021-06-17 ENCOUNTER — HOSPITAL ENCOUNTER (OUTPATIENT)
Age: 61
Discharge: HOME | DRG: 247 | End: 2021-06-17
Payer: MEDICARE

## 2021-06-17 ENCOUNTER — HOSPITAL ENCOUNTER (OUTPATIENT)
Dept: CT IMAGING | Age: 61
Discharge: HOME | DRG: 247 | End: 2021-06-17
Payer: MEDICARE

## 2021-06-17 ENCOUNTER — APPOINTMENT (OUTPATIENT)
Dept: ULTRASOUND IMAGING | Age: 61
DRG: 247 | End: 2021-06-17
Payer: MEDICARE

## 2021-06-17 ENCOUNTER — HOSPITAL ENCOUNTER (OUTPATIENT)
Dept: RADIATION ONCOLOGY | Age: 61
Discharge: HOME | End: 2021-06-17
Attending: RADIOLOGY
Payer: MEDICARE

## 2021-06-17 ENCOUNTER — HOSPITAL ENCOUNTER (INPATIENT)
Age: 61
LOS: 1 days | Discharge: HOME | DRG: 247 | End: 2021-06-18
Attending: EMERGENCY MEDICINE | Admitting: INTERNAL MEDICINE
Payer: MEDICARE

## 2021-06-17 DIAGNOSIS — C54.1 ENDOMETRIAL CANCER (HCC): ICD-10-CM

## 2021-06-17 DIAGNOSIS — K56.609 SBO (SMALL BOWEL OBSTRUCTION) (HCC): Primary | ICD-10-CM

## 2021-06-17 DIAGNOSIS — R10.32 ACUTE LEFT LOWER QUADRANT PAIN: ICD-10-CM

## 2021-06-17 LAB
ALBUMIN SERPL-MCNC: 4.1 G/DL (ref 3.5–5.1)
ALP BLD-CCNC: 103 U/L (ref 38–126)
ALT SERPL-CCNC: 9 U/L (ref 11–66)
ANION GAP SERPL CALCULATED.3IONS-SCNC: 12 MEQ/L (ref 8–16)
AST SERPL-CCNC: 14 U/L (ref 5–40)
BASOPHILS # BLD: 0.2 %
BASOPHILS ABSOLUTE: 0 THOU/MM3 (ref 0–0.1)
BILIRUB SERPL-MCNC: 0.4 MG/DL (ref 0.3–1.2)
BILIRUBIN DIRECT: < 0.2 MG/DL (ref 0–0.3)
BUN BLDV-MCNC: 12 MG/DL (ref 7–22)
CALCIUM SERPL-MCNC: 9.6 MG/DL (ref 8.5–10.5)
CHLORIDE BLD-SCNC: 103 MEQ/L (ref 98–111)
CO2: 24 MEQ/L (ref 23–33)
CREAT SERPL-MCNC: 0.6 MG/DL (ref 0.4–1.2)
EKG ATRIAL RATE: 67 BPM
EKG P AXIS: 18 DEGREES
EKG P-R INTERVAL: 154 MS
EKG Q-T INTERVAL: 402 MS
EKG QRS DURATION: 82 MS
EKG QTC CALCULATION (BAZETT): 424 MS
EKG R AXIS: -33 DEGREES
EKG T AXIS: -28 DEGREES
EKG VENTRICULAR RATE: 67 BPM
EOSINOPHIL # BLD: 0.5 %
EOSINOPHILS ABSOLUTE: 0 THOU/MM3 (ref 0–0.4)
ERYTHROCYTE [DISTWIDTH] IN BLOOD BY AUTOMATED COUNT: 17.3 % (ref 11.5–14.5)
ERYTHROCYTE [DISTWIDTH] IN BLOOD BY AUTOMATED COUNT: 49.6 FL (ref 35–45)
GFR SERPL CREATININE-BSD FRML MDRD: > 90 ML/MIN/1.73M2
GLUCOSE BLD-MCNC: 97 MG/DL (ref 70–108)
HCT VFR BLD CALC: 48 % (ref 37–47)
HEMOGLOBIN: 14.6 GM/DL (ref 12–16)
IMMATURE GRANS (ABS): 0.02 THOU/MM3 (ref 0–0.07)
IMMATURE GRANULOCYTES: 0.3 %
LACTIC ACID: 1.1 MMOL/L (ref 0.5–2)
LIPASE: 11.5 U/L (ref 5.6–51.3)
LYMPHOCYTES # BLD: 7.8 %
LYMPHOCYTES ABSOLUTE: 0.5 THOU/MM3 (ref 1–4.8)
MCH RBC QN AUTO: 25.5 PG (ref 26–33)
MCHC RBC AUTO-ENTMCNC: 30.4 GM/DL (ref 32.2–35.5)
MCV RBC AUTO: 83.9 FL (ref 81–99)
MONOCYTES # BLD: 6.5 %
MONOCYTES ABSOLUTE: 0.4 THOU/MM3 (ref 0.4–1.3)
NUCLEATED RED BLOOD CELLS: 0 /100 WBC
OSMOLALITY CALCULATION: 277.2 MOSMOL/KG (ref 275–300)
PLATELET # BLD: 236 THOU/MM3 (ref 130–400)
PMV BLD AUTO: 9.9 FL (ref 9.4–12.4)
POC CREATININE WHOLE BLOOD: 0.7 MG/DL (ref 0.5–1.2)
POTASSIUM REFLEX MAGNESIUM: 3.8 MEQ/L (ref 3.5–5.2)
PRO-BNP: 78.4 PG/ML (ref 0–900)
RBC # BLD: 5.72 MILL/MM3 (ref 4.2–5.4)
SEG NEUTROPHILS: 84.7 %
SEGMENTED NEUTROPHILS ABSOLUTE COUNT: 5 THOU/MM3 (ref 1.8–7.7)
SODIUM BLD-SCNC: 139 MEQ/L (ref 135–145)
TOTAL PROTEIN: 7.8 G/DL (ref 6.1–8)
TROPONIN T: < 0.01 NG/ML
WBC # BLD: 5.9 THOU/MM3 (ref 4.8–10.8)

## 2021-06-17 PROCEDURE — 83690 ASSAY OF LIPASE: CPT

## 2021-06-17 PROCEDURE — 74177 CT ABD & PELVIS W/CONTRAST: CPT

## 2021-06-17 PROCEDURE — G0378 HOSPITAL OBSERVATION PER HR: HCPCS

## 2021-06-17 PROCEDURE — 99284 EMERGENCY DEPT VISIT MOD MDM: CPT

## 2021-06-17 PROCEDURE — 83605 ASSAY OF LACTIC ACID: CPT

## 2021-06-17 PROCEDURE — 6360000004 HC RX CONTRAST MEDICATION: Performed by: NURSE PRACTITIONER

## 2021-06-17 PROCEDURE — 2580000003 HC RX 258: Performed by: STUDENT IN AN ORGANIZED HEALTH CARE EDUCATION/TRAINING PROGRAM

## 2021-06-17 PROCEDURE — 96374 THER/PROPH/DIAG INJ IV PUSH: CPT

## 2021-06-17 PROCEDURE — 6370000000 HC RX 637 (ALT 250 FOR IP): Performed by: STUDENT IN AN ORGANIZED HEALTH CARE EDUCATION/TRAINING PROGRAM

## 2021-06-17 PROCEDURE — 82565 ASSAY OF CREATININE: CPT

## 2021-06-17 PROCEDURE — 99221 1ST HOSP IP/OBS SF/LOW 40: CPT | Performed by: PHYSICIAN ASSISTANT

## 2021-06-17 PROCEDURE — 6360000002 HC RX W HCPCS: Performed by: STUDENT IN AN ORGANIZED HEALTH CARE EDUCATION/TRAINING PROGRAM

## 2021-06-17 PROCEDURE — 77387 GUIDANCE FOR RADJ TX DLVR: CPT | Performed by: RADIOLOGY

## 2021-06-17 PROCEDURE — 84484 ASSAY OF TROPONIN QUANT: CPT

## 2021-06-17 PROCEDURE — 93010 ELECTROCARDIOGRAM REPORT: CPT | Performed by: INTERNAL MEDICINE

## 2021-06-17 PROCEDURE — 80048 BASIC METABOLIC PNL TOTAL CA: CPT

## 2021-06-17 PROCEDURE — 36415 COLL VENOUS BLD VENIPUNCTURE: CPT

## 2021-06-17 PROCEDURE — 93005 ELECTROCARDIOGRAM TRACING: CPT | Performed by: STUDENT IN AN ORGANIZED HEALTH CARE EDUCATION/TRAINING PROGRAM

## 2021-06-17 PROCEDURE — 96361 HYDRATE IV INFUSION ADD-ON: CPT

## 2021-06-17 PROCEDURE — 80076 HEPATIC FUNCTION PANEL: CPT

## 2021-06-17 PROCEDURE — 76705 ECHO EXAM OF ABDOMEN: CPT

## 2021-06-17 PROCEDURE — 77412 RADIATION TX DELIVERY LVL 3: CPT | Performed by: RADIOLOGY

## 2021-06-17 PROCEDURE — 83880 ASSAY OF NATRIURETIC PEPTIDE: CPT

## 2021-06-17 PROCEDURE — 2580000003 HC RX 258: Performed by: PHYSICIAN ASSISTANT

## 2021-06-17 PROCEDURE — 1200000000 HC SEMI PRIVATE

## 2021-06-17 PROCEDURE — 71046 X-RAY EXAM CHEST 2 VIEWS: CPT

## 2021-06-17 PROCEDURE — 85025 COMPLETE CBC W/AUTO DIFF WBC: CPT

## 2021-06-17 RX ORDER — ACETAMINOPHEN 650 MG/1
650 SUPPOSITORY RECTAL EVERY 6 HOURS PRN
Status: DISCONTINUED | OUTPATIENT
Start: 2021-06-17 | End: 2021-06-18 | Stop reason: HOSPADM

## 2021-06-17 RX ORDER — SODIUM CHLORIDE 0.9 % (FLUSH) 0.9 %
10 SYRINGE (ML) INJECTION EVERY 12 HOURS SCHEDULED
Status: DISCONTINUED | OUTPATIENT
Start: 2021-06-17 | End: 2021-06-18 | Stop reason: HOSPADM

## 2021-06-17 RX ORDER — POLYETHYLENE GLYCOL 3350 17 G/17G
17 POWDER, FOR SOLUTION ORAL DAILY PRN
Status: DISCONTINUED | OUTPATIENT
Start: 2021-06-17 | End: 2021-06-18 | Stop reason: HOSPADM

## 2021-06-17 RX ORDER — SODIUM CHLORIDE 9 MG/ML
INJECTION, SOLUTION INTRAVENOUS CONTINUOUS
Status: DISCONTINUED | OUTPATIENT
Start: 2021-06-17 | End: 2021-06-18 | Stop reason: HOSPADM

## 2021-06-17 RX ORDER — ONDANSETRON 2 MG/ML
4 INJECTION INTRAMUSCULAR; INTRAVENOUS EVERY 6 HOURS PRN
Status: DISCONTINUED | OUTPATIENT
Start: 2021-06-17 | End: 2021-06-18 | Stop reason: HOSPADM

## 2021-06-17 RX ORDER — KETOROLAC TROMETHAMINE 30 MG/ML
15 INJECTION, SOLUTION INTRAMUSCULAR; INTRAVENOUS ONCE
Status: COMPLETED | OUTPATIENT
Start: 2021-06-17 | End: 2021-06-17

## 2021-06-17 RX ORDER — PROMETHAZINE HYDROCHLORIDE 25 MG/ML
6.25 INJECTION, SOLUTION INTRAMUSCULAR; INTRAVENOUS ONCE
Status: DISCONTINUED | OUTPATIENT
Start: 2021-06-17 | End: 2021-06-18 | Stop reason: HOSPADM

## 2021-06-17 RX ORDER — ACETAMINOPHEN 325 MG/1
650 TABLET ORAL EVERY 6 HOURS PRN
Status: DISCONTINUED | OUTPATIENT
Start: 2021-06-17 | End: 2021-06-18 | Stop reason: HOSPADM

## 2021-06-17 RX ORDER — SODIUM CHLORIDE 9 MG/ML
25 INJECTION, SOLUTION INTRAVENOUS PRN
Status: DISCONTINUED | OUTPATIENT
Start: 2021-06-17 | End: 2021-06-18 | Stop reason: HOSPADM

## 2021-06-17 RX ORDER — SODIUM CHLORIDE 0.9 % (FLUSH) 0.9 %
10 SYRINGE (ML) INJECTION PRN
Status: DISCONTINUED | OUTPATIENT
Start: 2021-06-17 | End: 2021-06-18 | Stop reason: HOSPADM

## 2021-06-17 RX ORDER — 0.9 % SODIUM CHLORIDE 0.9 %
1000 INTRAVENOUS SOLUTION INTRAVENOUS ONCE
Status: COMPLETED | OUTPATIENT
Start: 2021-06-17 | End: 2021-06-17

## 2021-06-17 RX ORDER — ONDANSETRON 4 MG/1
4 TABLET, ORALLY DISINTEGRATING ORAL EVERY 8 HOURS PRN
Status: DISCONTINUED | OUTPATIENT
Start: 2021-06-17 | End: 2021-06-18 | Stop reason: HOSPADM

## 2021-06-17 RX ADMIN — IOPAMIDOL 85 ML: 755 INJECTION, SOLUTION INTRAVENOUS at 11:35

## 2021-06-17 RX ADMIN — IOHEXOL 50 ML: 240 INJECTION, SOLUTION INTRATHECAL; INTRAVASCULAR; INTRAVENOUS; ORAL at 11:36

## 2021-06-17 RX ADMIN — SODIUM CHLORIDE 1000 ML: 9 INJECTION, SOLUTION INTRAVENOUS at 15:21

## 2021-06-17 RX ADMIN — LIDOCAINE HYDROCHLORIDE: 20 SOLUTION ORAL; TOPICAL at 15:19

## 2021-06-17 RX ADMIN — KETOROLAC TROMETHAMINE 15 MG: 30 INJECTION, SOLUTION INTRAMUSCULAR; INTRAVENOUS at 15:19

## 2021-06-17 RX ADMIN — SODIUM CHLORIDE: 9 INJECTION, SOLUTION INTRAVENOUS at 21:49

## 2021-06-17 ASSESSMENT — ENCOUNTER SYMPTOMS
NAUSEA: 1
COUGH: 0
ABDOMINAL DISTENTION: 1
STRIDOR: 0
ABDOMINAL PAIN: 1
BACK PAIN: 0
DIARRHEA: 0
CHEST TIGHTNESS: 0
WHEEZING: 0
CONSTIPATION: 1
SHORTNESS OF BREATH: 0
VOMITING: 1
BLOOD IN STOOL: 0
CHOKING: 0

## 2021-06-17 ASSESSMENT — PAIN SCALES - GENERAL
PAINLEVEL_OUTOF10: 2
PAINLEVEL_OUTOF10: 0

## 2021-06-17 NOTE — ED NOTES
Patient on cart, lights off, door closed. Respirations easy and unlabored. Eyes closed.      Jacqui Lopez RN  06/17/21 1369

## 2021-06-17 NOTE — ED NOTES
Medicated per order, EKG complete, encouraged to get urine sample. Lights turned down and door closed per request, patient states she wants to sleep. No distress noted.      Abi Lazaro RN  06/17/21 9008

## 2021-06-17 NOTE — ED NOTES
Patient resting on cart, eyes closed, respirations easy and unlabored. Lights out and door closed.      Haris Sanders RN  06/17/21 1994

## 2021-06-17 NOTE — ED PROVIDER NOTES
[address] [address]          Pt Name: Alanis Rodriguez  MRN: [id number]  [place] 1960  Date of evaluation: 6/17/2021  Treating Resident Physician: Terra Allen MD  Supervising Physician: Jesica Sumner       Chief Complaint   Patient presents with    Other     sent by CT for possible bowel obstruction      History obtained from the patient. HISTORY OF PRESENT ILLNESS    HPI  Alanis Rodriguez is a 61 y.o. female who presents to the emergency department for evaluation of nausea, vomiting. Patient states that she is currently undergoing radiation for endometrial cancer. And states that her abdominal pain past 2 days is 3 out of 10, cramping, not associated with any dysuria, frequency, urgency, diarrhea chest pain or shortness of breath, improved with defecation this morning. Patient had 2 nonbloody nonbilious episodes of emesis today. CT abdomen ordered by her oncologist for today. Results show partial or early small bowel obstruction. The patient has no other acute complaints at this time. REVIEW OF SYSTEMS   Review of Systems   Constitutional: Negative. HENT: Negative. Respiratory: Negative for cough, choking, chest tightness, shortness of breath, wheezing and stridor. Cardiovascular: Negative for chest pain, palpitations and leg swelling. Gastrointestinal: Positive for abdominal distention, abdominal pain, constipation, nausea and vomiting. Negative for blood in stool and diarrhea. Genitourinary: Negative for difficulty urinating, dysuria, frequency and urgency. Musculoskeletal: Negative for arthralgias, back pain, gait problem, joint swelling, myalgias and neck pain. Neurological: Negative.            PAST MEDICAL AND SURGICAL HISTORY     Past Medical History:   Diagnosis Date    Anemia     Infertility female     Obesity     Pap smear, as part of routine gynecological examination yrs    Visit for screening mammogram yrs     Past Surgical History:   Procedure Laterality Date    DILATION AND CURETTAGE OF UTERUS N/A 12/30/2020    DILATATION AND CURETTAGE HYSTEROSCOPY performed by John Denton MD at [address] [facility] ENDOMETRIAL BIOPSY  02/2021    HYSTERECTOMY  03/13/2021         MEDICATIONS     Current Facility-Administered Medications:     promethazine (PHENERGAN) injection 6.25 mg, 6.25 mg, Intramuscular, Once, Leigh Murillo MD    sodium chloride flush 0.9 % injection 10 mL, 10 mL, Intravenous, 2 times per day, AUNDREA Stuart-CRISPIN    sodium chloride flush 0.9 % injection 10 mL, 10 mL, Intravenous, PRN, AUNDREA Veliz-C    0.9 % sodium chloride infusion, 25 mL, Intravenous, PRN, AUNDREA Veliz-C    enoxaparin (LOVENOX) injection 40 mg, 40 mg, Subcutaneous, Q24H, AUNDREA Veliz-CRISIPN    ondansetron (ZOFRAN-ODT) disintegrating tablet 4 mg, 4 mg, Oral, Q8H PRN **OR** ondansetron (ZOFRAN) injection 4 mg, 4 mg, Intravenous, Q6H PRN, AUNDREA Veliz-C    polyethylene glycol (GLYCOLAX) packet 17 g, 17 g, Oral, Daily PRN, Michaela Mcneal PA-C    acetaminophen (TYLENOL) tablet 650 mg, 650 mg, Oral, Q6H PRN **OR** acetaminophen (TYLENOL) suppository 650 mg, 650 mg, Rectal, Q6H PRN, Michaela Mcneal PA-C    0.9 % sodium chloride infusion, , Intravenous, Continuous, AUNDREA Hardin, Last Rate: 100 mL/hr at 06/17/21 2149, New Bag at 06/17/21 2149      SOCIAL HISTORY     Social History     Social History Narrative    Not on file     Social History     Tobacco Use    Smoking status: Former Smoker     Packs/day: 0.00     Years: 5.00     Pack years: 0.00     Types: Cigars     Quit date: 5/7/2011     Years since quitting: 10.1    Smokeless tobacco: Never Used   Vaping Use    Vaping Use: Never used   Substance Use Topics    Alcohol use:  Yes     Alcohol/week: 2.0 standard drinks     Types: 1 Glasses of wine, 1 Cans of beer per week Comment: occasional    Drug use: No         ALLERGIES   No Known Allergies      FAMILY HISTORY     Family History   Problem Relation Age of Onset    Heart Disease Mother     Cancer Father         pancreatic    Cancer Brother         stomach    Glaucoma Neg Hx          PREVIOUS RECORDS   Previous records reviewed: Medical, past surgical, medications, allergies        PHYSICAL EXAM     ED Triage Vitals [06/17/21 1413]   BP Temp Temp Source Pulse Resp SpO2 Height Weight   (!) 130/97 98.6 °F (37 °C) Oral 75 16 99 % -- --     Initial vital signs and nursing assessment reviewed and Hypertensive. Body mass index is 42.07 kg/m². Pulsoximetry is normal per my interpretation. Additional Vital Signs:  Vitals:    06/17/21 1951   BP: (!) 156/85   Pulse: 69   Resp: 16   Temp: 97.8 °F (36.6 °C)   SpO2: 98%       Physical Exam  Constitutional:       General: She is not in acute distress. Appearance: Normal appearance. She is obese. She is ill-appearing. She is not toxic-appearing or diaphoretic. HENT:      Head: Normocephalic. Right Ear: External ear normal.      Left Ear: External ear normal.      Nose: Nose normal.      Mouth/Throat:      Mouth: Mucous membranes are dry. Pharynx: Oropharynx is clear. Eyes:      Extraocular Movements: Extraocular movements intact. Conjunctiva/sclera: Conjunctivae normal.      Pupils: Pupils are equal, round, and reactive to light. Cardiovascular:      Rate and Rhythm: Normal rate and regular rhythm. Pulses: Normal pulses. Heart sounds: Normal heart sounds. No murmur heard. Pulmonary:      Effort: Pulmonary effort is normal. No respiratory distress. Breath sounds: Normal breath sounds. No wheezing or rales. Abdominal:      General: Bowel sounds are normal. There is distension. Palpations: Abdomen is soft. Tenderness: There is abdominal tenderness (suprapubic, left lower quadrant).  There is no right CVA tenderness, left CVA tenderness, guarding or rebound. Musculoskeletal:         General: Normal range of motion. Cervical back: Normal range of motion and neck supple. Right lower leg: No edema. Left lower leg: No edema. Skin:     General: Skin is warm and dry. Capillary Refill: Capillary refill takes less than 2 seconds. Findings: No rash. Neurological:      General: No focal deficit present. Mental Status: She is alert and oriented to person, place, and time. Mental status is at baseline. Sensory: No sensory deficit. Motor: No weakness. MEDICAL DECISION MAKING   Initial Assessment and Plan:    This is a 63-year-old female, history of adenocarcinoma of the endometrium, status post hysterectomy, presenting with abdominal pain, nausea, vomiting. Was sent to CT outpatient by her oncologist, continued vomiting after that, came into ED. CT showed a early complete bowel obstruction or a partial bowel obstruction. Patient did have a bowel movement today that was firm. Physical exam significant for ill-appearing, tenderness to palpation of suprapubic and left lower quadrant, mildly distended. Labs unremarkable. condition improved with phenergan, IVF, and toradol. Would like to admit this patient for bowel obstruction with significant nausea.         ED RESULTS   Laboratory results:  Labs Reviewed   CBC WITH AUTO DIFFERENTIAL - Abnormal; Notable for the following components:       Result Value    RBC 5.72 (*)     Hematocrit 48.0 (*)     MCH 25.5 (*)     MCHC 30.4 (*)     RDW-CV 17.3 (*)     RDW-SD 49.6 (*)     Lymphocytes Absolute 0.5 (*)     All other components within normal limits   HEPATIC FUNCTION PANEL - Abnormal; Notable for the following components:    ALT 9 (*)     All other components within normal limits   BASIC METABOLIC PANEL W/ REFLEX TO MG FOR LOW K   TROPONIN   BRAIN NATRIURETIC PEPTIDE   LIPASE   LACTIC ACID, PLASMA   ANION GAP   GLOMERULAR FILTRATION RATE, ESTIMATED   OSMOLALITY Given 6/17/21 1519)         ED COURSE           MEDICATION CHANGES     Current Discharge Medication List            FINAL DISPOSITION     Final diagnoses:   SBO (small bowel obstruction) ([facility] [place] [address]     Condition: condition: stable  Dispo: Admit to telemetry      This transcription was electronically signed. Parts of this transcriptions may have been dictated by use of voice recognition software and electronically transcribed, and parts may have been transcribed with the assistance of an ED scribe. The transcription may contain errors not detected in proofreading. Please refer to my supervising physician's documentation if my documentation differs.     Electronically Signed: Roseanne Santacruz MD, 06/17/21, 10:26 PM          Roseanne Santacruz MD  Resident  06/17/21 0351

## 2021-06-17 NOTE — ED NOTES
ED to inpatient nurses report    Chief Complaint   Patient presents with    Other     sent by CT for possible bowel obstruction       Present to ED from home  LOC: alert and orientated to name, place, date  Vital signs   Vitals:    06/17/21 1413 06/17/21 1443 06/17/21 1519   BP: (!) 130/97 133/88    Pulse: 75 70 70   Resp: 16 16 16   Temp: 98.6 °F (37 °C)     TempSrc: Oral     SpO2: 99% 99% 100%      Oxygen Baseline RA    Current needs required RA Bipap/Cpap No  LDAs:   Peripheral IV 06/17/21 Right Antecubital (Active)     Mobility: Independent  Pending ED orders: Urine  Present condition: Stable    Electronically signed by Jacqui Lopez RN on 6/17/2021 at 4:23 PM       Jacqui Lopez [facility]  06/17/21 1990

## 2021-06-17 NOTE — PROGRESS NOTES
Pt admitted to  [facility] via from ED from ED. Complaints: SBO.    IV none infusing into the antecubital right, condition patent and no redness. IV site free of s/s of infection or infiltration. Vital signs obtained. Assessment and data collection initiated. Two nurse skin assessment performed by [facility] RN and Steven. Oriented to room. Policies and procedures for [facility] explained. All questions answered with no further questions at this time. Fall prevention and safety brochure discussed with patient. Bed alarm on. Call light in reach. Oriented to room. Jenni Esteves, RN, RN 6/17/2021 6:46 PM     Explained patients right to have family, representative or physician notified of their admission. Patient has Declined for physician to be notified. Patient has Declined for family/representative to be notified.

## 2021-06-17 NOTE — ED NOTES
Pt transported to [facility] by wheelchair in stable condition. Called [facility] and informed them that the patient was on their way to the unit.       Maxwell Quevedo, JONATHAN  33/02/29 7836

## 2021-06-17 NOTE — ED TRIAGE NOTES
Patient states she was getting a CT done today and while she was waiting for her ride she had an episode of vomiting. She states they then told her that she had a bowel obstruction and wheeled her over to the ED for further evaluation. Patient reports some mild nausea at this time. VSS.

## 2021-06-18 ENCOUNTER — APPOINTMENT (OUTPATIENT)
Dept: RADIATION ONCOLOGY | Age: 61
End: 2021-06-18
Attending: RADIOLOGY
Payer: MEDICARE

## 2021-06-18 ENCOUNTER — APPOINTMENT (OUTPATIENT)
Dept: GENERAL RADIOLOGY | Age: 61
DRG: 247 | End: 2021-06-18
Payer: MEDICARE

## 2021-06-18 VITALS
WEIGHT: 208.3 LBS | BODY MASS INDEX: 41.99 KG/M2 | DIASTOLIC BLOOD PRESSURE: 70 MMHG | HEART RATE: 85 BPM | TEMPERATURE: 97.8 F | RESPIRATION RATE: 16 BRPM | SYSTOLIC BLOOD PRESSURE: 138 MMHG | OXYGEN SATURATION: 100 % | HEIGHT: 59 IN

## 2021-06-18 LAB
ALBUMIN SERPL-MCNC: 2.8 G/DL (ref 3.5–5.1)
ALP BLD-CCNC: 79 U/L (ref 38–126)
ALT SERPL-CCNC: < 5 U/L (ref 11–66)
ANION GAP SERPL CALCULATED.3IONS-SCNC: 7 MEQ/L (ref 8–16)
AST SERPL-CCNC: 10 U/L (ref 5–40)
BACTERIA: ABNORMAL /HPF
BILIRUB SERPL-MCNC: 0.5 MG/DL (ref 0.3–1.2)
BILIRUBIN URINE: NEGATIVE
BLOOD, URINE: NEGATIVE
BUN BLDV-MCNC: 8 MG/DL (ref 7–22)
CALCIUM SERPL-MCNC: 8.5 MG/DL (ref 8.5–10.5)
CASTS 2: ABNORMAL /LPF
CASTS UA: ABNORMAL /LPF
CHARACTER, URINE: CLEAR
CHLORIDE BLD-SCNC: 112 MEQ/L (ref 98–111)
CO2: 23 MEQ/L (ref 23–33)
COLOR: YELLOW
CREAT SERPL-MCNC: 0.5 MG/DL (ref 0.4–1.2)
CRYSTALS, UA: ABNORMAL
EPITHELIAL CELLS, UA: ABNORMAL /HPF
ERYTHROCYTE [DISTWIDTH] IN BLOOD BY AUTOMATED COUNT: 16.2 % (ref 11.5–14.5)
ERYTHROCYTE [DISTWIDTH] IN BLOOD BY AUTOMATED COUNT: 48.1 FL (ref 35–45)
GFR SERPL CREATININE-BSD FRML MDRD: > 90 ML/MIN/1.73M2
GLUCOSE BLD-MCNC: 95 MG/DL (ref 70–108)
GLUCOSE URINE: NEGATIVE MG/DL
HCT VFR BLD CALC: 39.9 % (ref 37–47)
HEMOGLOBIN: 12.2 GM/DL (ref 12–16)
KETONES, URINE: ABNORMAL
LACTIC ACID: 0.5 MMOL/L (ref 0.5–2)
LEUKOCYTE ESTERASE, URINE: ABNORMAL
MCH RBC QN AUTO: 24.9 PG (ref 26–33)
MCHC RBC AUTO-ENTMCNC: 30.6 GM/DL (ref 32.2–35.5)
MCV RBC AUTO: 81.6 FL (ref 81–99)
MISCELLANEOUS 2: ABNORMAL
NITRITE, URINE: NEGATIVE
PH UA: 5.5 (ref 5–9)
PLATELET # BLD: 176 THOU/MM3 (ref 130–400)
PMV BLD AUTO: 9.1 FL (ref 9.4–12.4)
POTASSIUM REFLEX MAGNESIUM: 3.6 MEQ/L (ref 3.5–5.2)
PROTEIN UA: NEGATIVE
RBC # BLD: 4.89 MILL/MM3 (ref 4.2–5.4)
RBC URINE: ABNORMAL /HPF
RENAL EPITHELIAL, UA: ABNORMAL
SODIUM BLD-SCNC: 142 MEQ/L (ref 135–145)
SPECIFIC GRAVITY, URINE: > 1.03 (ref 1–1.03)
TOTAL PROTEIN: 5.9 G/DL (ref 6.1–8)
UROBILINOGEN, URINE: 0.2 EU/DL (ref 0–1)
WBC # BLD: 3.1 THOU/MM3 (ref 4.8–10.8)
WBC UA: ABNORMAL /HPF
YEAST: ABNORMAL

## 2021-06-18 PROCEDURE — 99239 HOSP IP/OBS DSCHRG MGMT >30: CPT | Performed by: PHYSICIAN ASSISTANT

## 2021-06-18 PROCEDURE — 99253 IP/OBS CNSLTJ NEW/EST LOW 45: CPT | Performed by: SURGERY

## 2021-06-18 PROCEDURE — 36415 COLL VENOUS BLD VENIPUNCTURE: CPT

## 2021-06-18 PROCEDURE — 80053 COMPREHEN METABOLIC PANEL: CPT

## 2021-06-18 PROCEDURE — 2580000003 HC RX 258: Performed by: PHYSICIAN ASSISTANT

## 2021-06-18 PROCEDURE — 96361 HYDRATE IV INFUSION ADD-ON: CPT

## 2021-06-18 PROCEDURE — 81001 URINALYSIS AUTO W/SCOPE: CPT

## 2021-06-18 PROCEDURE — 83605 ASSAY OF LACTIC ACID: CPT

## 2021-06-18 PROCEDURE — 85027 COMPLETE CBC AUTOMATED: CPT

## 2021-06-18 PROCEDURE — 74019 RADEX ABDOMEN 2 VIEWS: CPT

## 2021-06-18 PROCEDURE — G0378 HOSPITAL OBSERVATION PER HR: HCPCS

## 2021-06-18 RX ORDER — ONDANSETRON 4 MG/1
4 TABLET, ORALLY DISINTEGRATING ORAL EVERY 8 HOURS PRN
Qty: 12 TABLET | Refills: 0 | Status: SHIPPED | OUTPATIENT
Start: 2021-06-18 | End: 2021-07-28 | Stop reason: ALTCHOICE

## 2021-06-18 RX ORDER — POLYETHYLENE GLYCOL 3350 17 G/17G
17 POWDER, FOR SOLUTION ORAL DAILY
Qty: 527 G | Refills: 0 | Status: SHIPPED | OUTPATIENT
Start: 2021-06-18 | End: 2021-07-19

## 2021-06-18 RX ADMIN — SODIUM CHLORIDE, PRESERVATIVE FREE 10 ML: 5 INJECTION INTRAVENOUS at 08:45

## 2021-06-18 ASSESSMENT — ENCOUNTER SYMPTOMS
ABDOMINAL PAIN: 1
SHORTNESS OF BREATH: 0
CHEST TIGHTNESS: 0
SORE THROAT: 0
VOMITING: 1
BACK PAIN: 0
RHINORRHEA: 0
NAUSEA: 1
SINUS PRESSURE: 0
COLOR CHANGE: 0

## 2021-06-18 ASSESSMENT — PAIN SCALES - GENERAL: PAINLEVEL_OUTOF10: 0

## 2021-06-18 NOTE — H&P
Genitourinary: Negative for dysuria, hematuria and urgency. Musculoskeletal: Negative for back pain and neck pain. Skin: Negative for color change, pallor and wound. Neurological: Negative for dizziness, seizures, syncope, weakness and light-headedness. Psychiatric/Behavioral: Negative for agitation and confusion. The patient is not nervous/anxious. PMH:    Past Medical History:   Diagnosis Date    Anemia     Infertility female     Obesity     Pap smear, as part of routine gynecological examination yrs    Visit for screening mammogram yrs     SHX:    Social History     Socioeconomic History    Marital status: Single     Spouse name: Not on file    Number of children: Not on file    Years of education: Not on file    Highest education level: Not on file   Occupational History    Not on file   Tobacco Use    Smoking status: Former Smoker     Packs/day: 0.00     Years: 5.00     Pack years: 0.00     Types: Cigars     Quit date: 5/7/2011     Years since quitting: 10.1    Smokeless tobacco: Never Used   Vaping Use    Vaping Use: Never used   Substance and Sexual Activity    Alcohol use: Yes     Alcohol/week: 2.0 standard drinks     Types: 1 Glasses of wine, 1 Cans of beer per week     Comment: occasional    Drug use: No    Sexual activity: Yes     Partners: Male   Other Topics Concern    Not on file   Social History Narrative    Not on file     Social Determinants of Health     Financial Resource Strain:     Difficulty of Paying Living Expenses:    Food Insecurity:     Worried About Running Out of Food in the Last Year:     Ran Out of Food in the Last Year:    Transportation Needs:     Lack of Transportation (Medical):      Lack of Transportation (Non-Medical):    Physical Activity:     Days of Exercise per Week:     Minutes of Exercise per Session:    Stress:     Feeling of Stress :    Social Connections:     Frequency of Communication with Friends and Family:     Frequency of Social Gatherings with Friends and Family:     Attends Islam Services:     Active Member of Clubs or Organizations:     Attends Club or Organization Meetings:     Marital Status:    Intimate Partner Violence:     Fear of Current or Ex-Partner:     Emotionally Abused:     Physically Abused:     Sexually Abused:      FHX:   Family History   Problem Relation Age of Onset    Heart Disease Mother     Cancer Father         pancreatic    Cancer Brother         stomach    Glaucoma Neg Hx      Allergies: No Known Allergies  Medications:     sodium chloride        promethazine  6.25 mg Intramuscular Once    sodium chloride flush  10 mL Intravenous 2 times per day    enoxaparin  40 mg Subcutaneous Q24H     sodium chloride flush, 10 mL, PRN  sodium chloride, 25 mL, PRN  ondansetron, 4 mg, Q8H PRN   Or  ondansetron, 4 mg, Q6H PRN  polyethylene glycol, 17 g, Daily PRN  acetaminophen, 650 mg, Q6H PRN   Or  acetaminophen, 650 mg, Q6H PRN        Labs:   Recent Results (from the past 24 hour(s))   POCT Creatinine    Collection Time: 06/17/21 11:34 AM   Result Value Ref Range    POC CREATININE WHOLE BLOOD 0.7 0.5 - 1.2 mg/dl   EKG 12 Lead    Collection Time: 06/17/21  3:11 PM   Result Value Ref Range    Ventricular Rate 67 BPM    Atrial Rate 67 BPM    P-R Interval 154 ms    QRS Duration 82 ms    Q-T Interval 402 ms    QTc Calculation (Bazett) 424 ms    P Axis 18 degrees    R Axis -33 degrees    T Axis -28 degrees   CBC Auto Differential    Collection Time: 06/17/21  3:17 PM   Result Value Ref Range    WBC 5.9 4.8 - 10.8 thou/mm3    RBC 5.72 (H) 4.20 - 5.40 mill/mm3    Hemoglobin 14.6 12.0 - 16.0 gm/dl    Hematocrit 48.0 (H) 37.0 - 47.0 %    MCV 83.9 81.0 - 99.0 fL    MCH 25.5 (L) 26.0 - 33.0 pg    MCHC 30.4 (L) 32.2 - 35.5 gm/dl    RDW-CV 17.3 (H) 11.5 - 14.5 %    RDW-SD 49.6 (H) 35.0 - 45.0 fL    Platelets 421 535 - 005 thou/mm3    MPV 9.9 9.4 - 12.4 fL    Seg Neutrophils 84.7 %    Lymphocytes 7.8 %    Monocytes 6.5 %    Eosinophils 0.5 %    Basophils 0.2 %    Immature Granulocytes 0.3 %    Segs Absolute 5.0 1 - 7 thou/mm3    Lymphocytes Absolute 0.5 (L) 1.0 - 4.8 thou/mm3    Monocytes Absolute 0.4 0.4 - 1.3 thou/mm3    Eosinophils Absolute 0.0 0.0 - 0.4 thou/mm3    Basophils Absolute 0.0 0.0 - 0.1 thou/mm3    Immature Grans (Abs) 0.02 0.00 - 0.07 thou/mm3    nRBC 0 /100 wbc   Basic Metabolic Panel w/ Reflex to MG    Collection Time: 06/17/21  3:17 PM   Result Value Ref Range    Sodium 139 135 - 145 meq/L    Potassium reflex Magnesium 3.8 3.5 - 5.2 meq/L    Chloride 103 98 - 111 meq/L    CO2 24 23 - 33 meq/L    Glucose 97 70 - 108 mg/dL    BUN 12 7 - 22 mg/dL    CREATININE 0.6 0.4 - 1.2 mg/dL    Calcium 9.6 8.5 - 10.5 mg/dL   Hepatic Function Panel    Collection Time: 06/17/21  3:17 PM   Result Value Ref Range    Albumin 4.1 3.5 - 5.1 g/dL    Total Bilirubin 0.4 0.3 - 1.2 mg/dL    Bilirubin, Direct <0.2 0.0 - 0.3 mg/dL    Alkaline Phosphatase 103 38 - 126 U/L    AST 14 5 - 40 U/L    ALT 9 (L) 11 - 66 U/L    Total Protein 7.8 6.1 - 8.0 g/dL   Troponin    Collection Time: 06/17/21  3:17 PM   Result Value Ref Range    Troponin T < 0.010 ng/ml   Brain Natriuretic Peptide    Collection Time: 06/17/21  3:17 PM   Result Value Ref Range    Pro-BNP 78.4 0.0 - 900.0 pg/mL   Lipase    Collection Time: 06/17/21  3:17 PM   Result Value Ref Range    Lipase 11.5 5.6 - 51.3 U/L   Lactic Acid, Plasma    Collection Time: 06/17/21  3:17 PM   Result Value Ref Range    Lactic Acid 1.1 0.5 - 2.0 mmol/L   Anion Gap    Collection Time: 06/17/21  3:17 PM   Result Value Ref Range    Anion Gap 12.0 8.0 - 16.0 meq/L   Glomerular Filtration Rate, Estimated    Collection Time: 06/17/21  3:17 PM   Result Value Ref Range    Est, Glom Filt Rate >90 ml/min/1.73m2   Osmolality    Collection Time: 06/17/21  3:17 PM   Result Value Ref Range    Osmolality Calc 277.2 275.0 - 300.0 mOsmol/kg         Vital Signs: BP: (!) 156/85, Temp: 97.8 °F (36.6 °C), Pulse: 69, Resp: 16, SpO2: 98 %    GENERAL: Presents sitting upright in bed unassisted, Awake and alert; In no acute distress and well nourished. SKIN: Appropriate for ethnicity, warm and dry. EYES: No apparent trauma, discharge, or hematoma bilaterally. PERRL at 3mm  CARDIO: No visible chest wall deformity. No heaves or lifts. Strong/regular S1/S2 rate and rhythm. No rubs murmurs, or gallops. 2+ radial and dorsalis pedal pulses. Capillary refill <2 sec. No extremity edema noted. PULMONARY:  Trachea midline, no audible wheezing, increased respiratory effort, accessory muscle use, or asymmetrical chest wall movement. Lung sounds are clear to ascultation in superior and inferior fields. No wheezing, crackles, or rhonchi. ABDOMEN: Abdomen is non distended. Bowel sounds present in all four quadrants. Soft and RUQ tender to palpation, negative Jernigan sign. NEURO: Follows commands. PMS intact, moves limbs freely. No focal neurological deficits  MSK: Extremities intact and present. No new bruising, swelling, deformity, discoloration or bleeding. No new tenderness to muscular or bony structure palpation.  strength 5/5 and equal bilaterally. Data: (All radiographs, tracings, PFTs, and imaging are personally viewed and interpreted unless otherwise noted). XR CHEST (2 VW)   Final Result   1. No acute cardiopulmonary disease. **This report has been created using voice recognition software. It may contain minor errors which are inherent in voice recognition technology. **      Final report electronically signed by Dr. Ave Dubose on 6/17/2021 3:17 PM      XR ABDOMEN (2 VIEWS)    (Results Pending)       EKG: rhythm: Normal sinus rhythm, possible left atrial enlargement, left axis deviation, LVH, nonspecific ST/T wave abnormalities, no significant changes since EKG on 11/18/2020, rate=67 bpm, yd=075 ms, qrs=82 ms, dh=474 ms, axis=18 degrees                                            Electronically signed by

## 2021-06-18 NOTE — CONSULTS
[facility] Surgery Consultation - Karol Cheatham MD      Pt Name: Rio Lynn  MRN: [id number]  YOB: 1960  Date of evaluation: 6/18/2021  Primary Care Physician: Pollo Palacios DO  Patient evaluated at the request of  [facility] [facility],[address]  Reason for evaluation: Supple SBO  IMPRESSIONS:   1. Endometrial cancer stage II undergoing radiation therapy. Has had alternating constipation and diarrhea throughout. 2. CT imaging fecalization of the terminal ileum. I do not see evidence of a mechanical obstruction. Follow-up films show contrast in the colon. Patient had been taking Imodium. 3. Cholelithiasis without clinical evidence of cholecystitis   has a past medical history of Anemia, Infertility female, Obesity, Pap smear, as part of routine gynecological examination, and Visit for screening mammogram.  RECOMMENDATIONS:   1. Care per primary service. I have taken the liberty of ordering liquid diet. May advance as tolerated. Will follow. 2. Recommend holding Imodium. 3. Encourage patient to make sure she is taking plenty of fluids throughout her treatment. 4. Benign abdomen on examination. Surgery will follow. 5.  X-ray reviewed nonobstructed. Contrast in colon. Benign abdomen. SUBJECTIVE:   History of Chief Complaint:    Rosi Alcantar is a 61 y. o.female who presents with abdominal pain. She is undergoing radiation therapy for endometrial cancer. She is about in the middle of her radiation treatments. She has been having some diarrhea throughout the treatment and has been taking Imodium. She had some left lower quadrant pain and was sent from radiation for a CT scan. She was given oral contrast had one episode of nausea and vomiting. She has had multiple bowel movements. She is going for x-ray but limited examination of her abdomen was benign. No significant pain she is nontoxic in appearance.   X-ray reviewed from this morning nonobstructive and contrast throughout the colon. Past Medical History   has a past medical history of Anemia, Infertility female, Obesity, Pap smear, as part of routine gynecological examination, and Visit for screening mammogram.  Past Surgical History   has a past surgical history that includes Ectopic pregnancy surgery; Dilation and curettage of uterus (N/A, 12/30/2020); Endometrial biopsy (02/2021); and Hysterectomy (03/13/2021). Medications  Prior to Admission medications    Medication Sig Start Date End Date Taking? Authorizing Provider   ondansetron (ZOFRAN-ODT) 4 MG disintegrating tablet Take 1 tablet by mouth every 8 hours as needed for Nausea or Vomiting 6/18/21  Yes Hudson Miles PA-C   polyethylene glycol (GLYCOLAX) 17 g packet Take 17 g by mouth daily 6/18/21 7/19/21 Yes Hudson Miles PA-C   cyclobenzaprine (FLEXERIL) 10 MG tablet Take 10 mg by mouth 3 times daily as needed for Muscle spasms  Patient not taking: Reported on 5/20/2021    Historical Provider, MD   oxybutynin (DITROPAN) 5 MG tablet Take 1 tablet by mouth 2 times daily  Patient not taking: Reported on 6/4/2021 3/13/20   JOANNA Zavala - CNP    Scheduled Meds:   promethazine  6.25 mg Intramuscular Once    sodium chloride flush  10 mL Intravenous 2 times per day    enoxaparin  40 mg Subcutaneous Q24H     Continuous Infusions:   sodium chloride      sodium chloride 100 mL/hr at 06/17/21 0319     PRN Meds:.sodium chloride flush, sodium chloride, ondansetron **OR** ondansetron, polyethylene glycol, acetaminophen **OR** acetaminophen  Allergies  has No Known Allergies. Family History  family history includes Cancer in her brother and father; Heart Disease in her mother. Social History   reports that she quit smoking about 10 years ago. Her smoking use included cigars. She smoked 0.00 packs per day for 5.00 years. She has never used smokeless tobacco. She reports current alcohol use of about 2.0 standard drinks of alcohol per week.  She reports that she does not use drugs.  Review of Systems  General Denies any fever or chills  HEENT Denies any diplopia, tinnitus or vertigo  Resp Denies any shortness of breath, cough or wheezing  Cardiac Denies any chest pain, palpitations, claudication or edema  GI had some left lower quadrant pain and some diarrhea throughout radiation treatment  Denies any melena, hematochezia, hematemesis or pyrosis   denies any dysuria   heme Denies bruising or bleeding easily  Endocrine Denies any history of diabetes or thyroid disease  Neuro Denies any focal motor or sensory deficits  SUBJECTIVE:   CURRENT VITALS:  height is 4' 11\" (1.499 m) and weight is 208 lb 4.8 oz (94.5 kg). Her oral temperature is 97.8 °F (36.6 °C). Her blood pressure is 138/70 and her pulse is 85. Her respiration is 16 and oxygen saturation is 100%. Body mass index is 42.07 kg/m². Temperature Range (24h):Temp: 97.8 °F (36.6 °C) Temp  Av.8 °F (36.6 °C)  Min: 97.5 °F (36.4 °C)  Max: 98 °F (36.7 °C)  BP Range (14Z): Systolic (39ACX), JRH:032 , Min:138 , JDZ:862     Diastolic (16CTO), DXD:70, Min:70, Max:97    Pulse Range (24h): Pulse  Av.1  Min: 69  Max: 85  Respiration Range (24h): Resp  Av.3  Min: 16  Max: 18  Current Pulse Ox (24h):  SpO2: 100 %  Pulse Ox Range (24h):  SpO2  Av.9 %  Min: 95 %  Max: 100 %  Oxygen Amount and Delivery:    CONSTITUTIONAL: Alert and oriented times 3, no acute distress and cooperative to examination with proper mood and affect. SKIN: Skin color, texture, turgor normal. No rashes or lesions. LYMPH: no cervical nodes, no inguinal nodes  HEENT: Head is normocephalic, atraumatic. EOMI, PERRLA. NECK: supple, symmetrical, trachea midline.   CHEST/LUNGS: chest symmetric with normal A/P diameter, normal respiratory rate and rhythm, lungs clear to auscultation  CARDIOVASCULAR: {Normal heart rate  ABDOMEN: Soft nontender throughout bowel sounds normoactive  RECTAL: Deferred  NEUROLOGIC: Alert and oriented without gross deficit  EXTREMITIES: Imaging reviewed  Recent Labs     06/17/21  1517 06/18/21  0402 06/18/21  0621   WBC 5.9  --  3.1*   HGB 14.6  --  12.2   HCT 48.0*  --  39.9     --  176     --  142   K 3.8  --  3.6     --  112*   CO2 24  --  23   BUN 12  --  8   CREATININE 0.6  --  0.5   CALCIUM 9.6  --  8.5   AST 14  --  10   ALT 9*  --  <5*   BILITOT 0.4  --  0.5   BILIDIR <0.2  --   --    LIPASE 11.5  --   --    LACTA 1.1  --  0.5   NITRU  --  NEGATIVE  --    COLORU  --  YELLOW  --    BACTERIA  --  NONE SEEN  --      RADIOLOGY:     XR ABDOMEN (2 VIEWS)   Final Result   1. No pneumoperitoneum. 2. Nonobstructive bowel gas pattern. Final report electronically signed by Dr. Vu Wong on 6/18/2021 7:32 AM      US GALLBLADDER RUQ   Final Result   Cholelithiasis without sonographic finding for acute cholecystitis. This document has been electronically signed by: Madalyn Romo MD on    06/17/2021 10:06 PM      XR CHEST (2 VW)   Final Result   1. No acute cardiopulmonary disease. **This report has been created using voice recognition software. It may contain minor errors which are inherent in voice recognition technology. **      Final report electronically signed by Dr. Marzette Boast on 6/17/2021 3:17 PM            Electronically signed by Shelby Gunn MD on 6/18/2021 at 3:00 PM

## 2021-06-18 NOTE — CARE COORDINATION
6/18/21, 3:02 PM EDT    Patient goals/plan/ treatment preferences discussed by  and . Patient goals/plan/ treatment preferences reviewed with patient/ family. Patient/ family verbalize understanding of discharge plan and are in agreement with goal/plan/treatment preferences. Understanding was demonstrated using the teach back method. AVS provided by RN at time of discharge, which includes all necessary medical information pertaining to the patients current course of illness, treatment, post-discharge goals of care, and treatment preferences. Spoke with patient, she plans to return home at discharge, denies need for skilled care. Reports issues with laundry as she does not drive and does not have washer/dryer. Also reports a difficult time cleaning home. Information for [place] on aging given, explained she might not be eligible r/t age but if she qualifies they could assist. Information accepted, reports she will call once one.

## 2021-06-18 NOTE — PLAN OF CARE
Problem: Discharge Planning:  Goal: Participates in care planning  Description: Participates in care planning  Outcome: Ongoing  Note: Plan for discharge home. Problem: Falls - Risk of:  Goal: Will remain free from falls  Description: Will remain free from falls  Outcome: Ongoing  Note: Patient remains free from falls this shift. Fall risk assessment complete. Fall sign posted. Non skid socks on. Bed in lowest position, 2/4 siderails up. Bed alarm on. Call light and all possessions within reach. Ambulates with assist. Rounding hourly       Problem: Pain:  Goal: Control of acute pain  Description: Control of acute pain  Outcome: Ongoing  Note: Pain Assessment: 0-10  Pain Level: 0       Is pain goal met at this time? Yes       Problem: GI  Goal: No bowel complications  Note: No bowel movement noted this shift. Bowel sounds present. Care plan reviewed with patient. Patient verbalize understanding of the plan of care and contribute to goal setting.

## 2021-06-18 NOTE — CONSULTS
[address] [place] [address]                                  CONSULTATION    PATIENT NAME: Pollo Miller                     :        1960  MED REC NO:   [id number]                           ROOM:       [id number]  ACCOUNT NO:   [de-identified]                           ADMIT DATE: 2021  PROVIDER:     Tito Claudio. Erica Nova M.D.    Anderson Luceroe:  2021    MEDICAL ONCOLOGY CONSULTATION    REFERRING PROVIDER:  Hema Flores PA-C    CONSULTING PROVIDER:  Mary Rivas MD    INDICATION FOR CONSULTATION:  The patient with endometrial cancer. HISTORY OF PRESENT ILLNESS:  The patient is a 61-year-old pleasant  female with a past medical history of endometrial cancer, who presented  on this admission with nausea and vomiting. The patient was noted to  have partial or early complete small bowel obstruction as per the  findings on the CT scan of the abdomen. The patient is recently  diagnosed with endometrial cancer and currently the patient is receiving  radiation therapy to the endometrium, which was started on 2021. She is half way through the treatment. The patient had normal bowel  movement one day prior to this admission. The patient had one episode  of nausea and vomiting after her CT scan today on the day of admission. The patient had some left-sided abdominal pain over the past day. No  history of any shortness of breath, chest pain, headache, dizziness, or  fever. Small bowel obstruction is suspected. The patient's hemoglobin  is 14.6 on admission. PAST MEDICAL HISTORY:  Positive for anemia, infertility female, obesity,  Pap smear as part of routine gynecologic examination, visit for  screening mammogram.    FAMILY HISTORY:  Positive for heart disease in mother and pancreatic  cancer in father. Brother had stomach cancer. SOCIAL HISTORY:  The patient is single.   The patient is a former smoker, signs of colitis. The patient's  nausea and vomiting are improving at this time on 06/18/2021 afternoon  at 03:00 p.m. PLAN:  The patient's radiation therapy will be kept on hold at this time  until the patient's acute condition improves. Once the patient is  discharged, she can be resumed on her radiation therapy as scheduled. She will be monitored carefully during her acute illness. Thank you Edward Helton for the interesting consult on this pleasant  woman with multiple medical problems. Please do not hesitate to call  with any questions or concerns if I can be of any further assistance.         Cb Yoon M.D.    D: 06/18/2021 14:56:00       T: 06/18/2021 15:59:11     [email or web address]  Job#: [id number]     Doc#: [id number]    CC:

## 2021-06-19 NOTE — DISCHARGE SUMMARY
Hospitalist Discharge Summary    Patient: Reggie Cruz  YOB: 1960  MRN: [id number]   Acct: [de-identified]    Primary Care Physician: Alaina Roach DO    Admit date  6/17/2021    Discharge date: 6/18/2021   Disposition: Home       Discharge Assessment and Plan:    1. Small bowel obstruction, resolved: partial vs. Early complete obstruction. Consult general surgery. Resolved on KUB. Pt had multiple BMs, tolerating general diet. Ok for [employer] from gen surg standpoint. 2. Cholelithiasis: RUQ pain with palpation. Negatives Jernigan's sign. US gallbladder reported cholelithiasis without acute cholecystitis. Gen surg aware. No indication for surgery at this time. 3. Endometrial cancer: Currently undergoing radiation treatments, began treatment 5/20/2021, concluding late June. Oncology consulted, pt will resume treatment when discharged. Chief Complaint on presentation: Nausea and vomiting    Initial H&P / Hospital Course: Per HPI: Aliyah Heck is a 61 y.o. female  with past medical history of anemia and endometrial cancer, presents to ED for nausea/vomiting as well as partial versus early complete small bowel obstruction noted on CT today. Patient states that she was recently diagnosed with endometrial cancer and and has been undergoing radiation treatments with a \"probe\" started late May will continue until late this month. . Patient reports her last normal bowel movement was yesterday, she experienced one episode of nausea and vomiting after her CT scan today. Nursing staff states patient has had 3 bowel movements today. Surgery aware and will see in a.m. Patient knowledges some left-sided abdominal pain over the past day. Patient denies chest pain, shortness of breath, cough, headache, dizziness, lightheadedness, numbness, paraesthesias, weakness, chills, fevers, abdominal pain,  neck pain, or back pain. \"     Subjective (day of discharge): Pt is doing well.  Ate general diet for lunch and is 9.4 - 12.4 fL   Comprehensive Metabolic Panel w/ Reflex to MG    Collection Time: 06/18/21  6:21 AM   Result Value Ref Range    Glucose 95 70 - 108 mg/dL    CREATININE 0.5 0.4 - 1.2 mg/dL    BUN 8 7 - 22 mg/dL    Sodium 142 135 - 145 meq/L    Potassium reflex Magnesium 3.6 3.5 - 5.2 meq/L    Chloride 112 (H) 98 - 111 meq/L    CO2 23 23 - 33 meq/L    Calcium 8.5 8.5 - 10.5 mg/dL    AST 10 5 - 40 U/L    Alkaline Phosphatase 79 38 - 126 U/L    Total Protein 5.9 (L) 6.1 - 8.0 g/dL    Albumin 2.8 (L) 3.5 - 5.1 g/dL    Total Bilirubin 0.5 0.3 - 1.2 mg/dL    ALT <5 (L) 11 - 66 U/L   Anion Gap    Collection Time: 06/18/21  6:21 AM   Result Value Ref Range    Anion Gap 7.0 (L) 8.0 - 16.0 meq/L   Glomerular Filtration Rate, Estimated    Collection Time: 06/18/21  6:21 AM   Result Value Ref Range    Est, Glom Filt Rate >90 ml/min/1.73m2        Microbiology:    Blood culture #1: No results found for: BC  Blood culture #2:No results found for: BLOODCULT2  Organism:    Lab Results   Component Value Date    LABGRAM  10/10/2020     No segmented neutrophils observed. Rare epithelial cells observed. Rare gram positive cocci in pairs. Prepubescent and postmenopausal female samples (<15and >47ears of age) are not typically suitable for bacterialvaginosis screening.      MRSA culture only:No results found for: [facility]  Urine culture: No results found for: LABURIN  Lab Results   Component Value Date    ORG Streptococcus agalactiae - (Group B) 10/10/2020      Respiratory culture: No results found for: CULTRESP  Aerobic and Anaerobic :  No results found for: LABAERO  No results found for: LABANAE    Urinalysis:     Lab Results   Component Value Date    NITRU NEGATIVE 06/18/2021    WBCUA 5-9 06/18/2021    BACTERIA NONE SEEN 06/18/2021    RBCUA 0-2 06/18/2021    BLOODU NEGATIVE 06/18/2021    SPECGRAV 1.020 08/24/2019    GLUCOSEU NEGATIVE 06/18/2021       Radiology:  XR CHEST (2 VW)    Result Date: 6/17/2021  PROCEDURE: XR CHEST (2 VW) CLINICAL INFORMATION: vomiting COMPARISON: 11/18/2020 TECHNIQUE:  AP and lateral chest x-ray  FINDINGS: The cardiomediastinal silhouette appears within the left upper limits of normal and appears stable when compared to prior examination dated 11/18/2020. No focal consolidation, pleural effusion or pneumothorax is seen. No acute osseous abnormalities are demonstrated. 1. No acute cardiopulmonary disease. **This report has been created using voice recognition software. It may contain minor errors which are inherent in voice recognition technology. ** Final report electronically signed by Dr. Arya Rae on 6/17/2021 3:17 PM    CT ABDOMEN PELVIS W IV CONTRAST Additional Contrast? Radiologist Recommendation    Result Date: 6/17/2021  PROCEDURE: CT ABDOMEN PELVIS W IV CONTRAST CLINICAL INFORMATION: Endometrial cancer ([address] Acute left lower quadrant pain COMPARISON: CT abdomen pelvis without contrast on August 22, 2020 TECHNIQUE: 5 mm axial imaging through the abdomen and pelvis with IV contrast.  Coronal and sagittal reconstructions were performed. All CT scans at this facility use dose modulation, iterative reconstruction, and/or weight based dosing when appropriate to reduce the radiation dose to as low as reasonably achievable. CONTRAST: 85 mL of Isovue-370. Oral contrast material was given to the patient however the patient throughout prior to the exam done only less than half of the contrast material was retained. FINDINGS: LUNG BASES: Unremarkable. LIVER/GALLBLADDER/BILIARY TREE: Unremarkable. PANCREAS/SPLEEN: Unremarkable. ADRENAL GLANDS/KIDNEYS: Unremarkable. BOWEL: The jejunum and proximal ileum have a normal diameter. Within the distal ileum proximal to the terminal ileum there is equalization of the bowel contents in the bowel. Dilated to 2.8 cm. The most distal ileum has a normal diameter. The colon has a normal diameter. FREE AIR/FREE FLUID/INFLAMMATION: None. LYMPHADENOPATHY: 1.  No pathologically enlarged lymph nodes. ABDOMINAL AORTA: Unremarkable. PELVIS: 1. Hysterectomy. There is a trace amount of free fluid in the pelvis. ABDOMINAL WALL: Unremarkable. MUSCULOSKELETAL: Unremarkable. OTHER: None. There is equalization of the distal ileum which is dilated to 2.8 cm. The distal terminal ileum has normal diameter findings are secondary to either partial small bowel obstruction or early complete small bowel obstruction. Cholelithiasis without signs of colitis. **This report has been created using voice recognition software. It may contain minor errors which are inherent in voice recognition technology. ** Final report electronically signed by Dr Robina Duran on 6/17/2021 1:03 PM    US GALLBLADDER RUQ    Result Date: 6/17/2021  US abdomen limited Comparison: None Findings: The visualized pancreas is normal. The aorta and inferior vena cava are normal caliber. The liver is normal in size and echotexture, measuring 14.8 cm in length. There is no intrahepatic bile duct dilatation. The common duct is 4 mm in diameter. The main portal vein is antegrade. Shadowing stones in the gallbladder without wall thickening or pericholecystic fluid. No positive sonographic Jernigan sign. Visualized right kidney is unremarkable without hydronephrosis. No ascites. Cholelithiasis without sonographic finding for acute cholecystitis. This document has been electronically signed by: Kate Donis MD on 06/17/2021 10:06 PM    XR ABDOMEN (2 VIEWS)    Result Date: 6/18/2021  PROCEDURE: XR ABDOMEN (2 VIEWS) CLINICAL INFORMATION: Abdominal pain TECHNIQUE: AP supine and upright abdominal radiographs COMPARISON: None FINDINGS: There is no free intraperitoneal air. Bowel gas pattern is nonobstructive. There is contrast in the colon from a prior examination. Contrast is also present in the urinary bladder. There are phleboliths in the pelvis. Degenerative changes in the lumbar spine are poorly visualized. 1. No pneumoperitoneum.  2. Nonobstructive bowel gas pattern. Final report electronically signed by Dr. Mary Lou Linares on 6/18/2021 7:32 AM       Consults:   [address] TO ONCOLOGY    Discharge Medications:      Medication List      START taking these medications    ondansetron 4 MG disintegrating tablet  Commonly known as: ZOFRAN-ODT  Take 1 tablet by mouth every 8 hours as needed for Nausea or Vomiting     polyethylene glycol 17 g packet  Commonly known as: GLYCOLAX  Take 17 g by mouth daily        CONTINUE taking these medications    cyclobenzaprine 10 MG tablet  Commonly known as: FLEXERIL     oxybutynin 5 MG tablet  Commonly known as: DITROPAN  Take 1 tablet by mouth 2 times daily           Where to Get Your Medications      These medications were sent to [facility], [address] [facility] [phone] [facility] [phone]  [address]    Phone: [phone]   · ondansetron 4 MG disintegrating tablet  · polyethylene glycol 17 g packet          Patient Instructions:    Discharge lab work: none  Activity: activity as tolerated  Diet: No diet orders on file      Follow-up visits:   [address] [place] [address]  [address] [facility] [address]  [phone]    Schedule an appointment as soon as possible for a visit in 1 week  Call Monday to make appointment within 1 week    Melinda Garcia MD  [facility].  [place]. [address] [place] [address]  [phone]    Go on 8/2/2021  130pm          Disposition: home  Condition at Discharge: Stable    Time Spent: 35 minutes    Signed: Thank you [address] [place] [address] for the opportunity to be involved in this patient's care.     Electronically signed by Viviana Shields PA-C on 6/19/2021 at 9:57 AM  Discharging Hospitalist

## 2021-06-21 ENCOUNTER — TELEPHONE (OUTPATIENT)
Dept: FAMILY MEDICINE CLINIC | Age: 61
End: 2021-06-21

## 2021-06-21 ENCOUNTER — HOSPITAL ENCOUNTER (OUTPATIENT)
Dept: RADIATION ONCOLOGY | Age: 61
Discharge: HOME | End: 2021-06-21
Attending: RADIOLOGY
Payer: MEDICARE

## 2021-06-21 PROCEDURE — 77412 RADIATION TX DELIVERY LVL 3: CPT | Performed by: RADIOLOGY

## 2021-06-21 PROCEDURE — 77387 GUIDANCE FOR RADJ TX DLVR: CPT | Performed by: RADIOLOGY

## 2021-06-21 PROCEDURE — 77295 3-D RADIOTHERAPY PLAN: CPT | Performed by: RADIOLOGY

## 2021-06-21 PROCEDURE — 77336 RADIATION PHYSICS CONSULT: CPT | Performed by: RADIOLOGY

## 2021-06-21 NOTE — TELEPHONE ENCOUNTER
Kim[facility] 45 Transitions Initial Follow Up Call    Outreach made within 2 business days of discharge: Yes    Patient: Cristy Cazares Patient : 1960   MRN: [id number]  Reason for Admission: There are no discharge diagnoses documented for the most recent discharge. Discharge Date: 21       Spoke with: Left message for return call. Discharge department/facility: [facility]    TCM Interactive Patient Contact:  Was patient able to fill all prescriptions:   Was patient instructed to bring all medications to the follow-up visit:   Is patient taking all medications as directed in the discharge summary?    Does patient understand their discharge instructions:   Does patient have questions or concerns that need addressed prior to 7-14 day follow up office visit:     Scheduled appointment with PCP within 7-14 days    Follow Up  Future Appointments   Date Time Provider Bailey oRcha   2021  8:45 AM SCHEDULE, [address] [place]   2021  9:00 AM [address] [facility], PT STRZ PT [place] [address] H[place]   2021  8:45 AM SCHEDULE, Tyrel Fender LINAC [address] HOD   2021  8:45 AM SCHEDULE, Tyrel Fender LINAC [address] [place]   2021  8:45 AM SCHEDULE, Tyrel Fender LINAC [address] [place]   2021  8:45 AM SCHEDULE, Tyrel Fender LINAC [address] [place]   2021  8:45 AM SCHEDULE, Tyrel Fender LINAC [place]   2021  9:00 AM [place] PTA STRZ PT [place]   2021  9:45 AM [address] [facility], PT STRZ PT [place] HOD   2021  1:30 PM Camille Giordano MD Sara [facility] S[facility] P - [address] [facility] [address], LPN

## 2021-06-21 NOTE — TELEPHONE ENCOUNTER
Kim[facility] 45 Transitions Initial Follow Up Call    Outreach made within 2 business days of discharge: Yes    Patient: Freddie Mcnamara Patient : 1960   MRN: [id number]  Reason for Admission: There are no discharge diagnoses documented for the most recent discharge. Discharge Date: 21       Spoke with: Freddie Mcnamara    Discharge department/facility: [facility]    TCM Interactive Patient Contact:  Was patient able to fill all prescriptions: Yes  Was patient instructed to bring all medications to the follow-up visit: Yes  Is patient taking all medications as directed in the discharge summary?  Yes  Does patient understand their discharge instructions: Yes  Does patient have questions or concerns that need addressed prior to 7-14 day follow up office visit: no    Scheduled appointment with PCP within 7-14 days  Scheduled with Dr. Franklyn Dewey on 2021 at 230 pm.    Follow Up  Future Appointments   Date Time Provider Bailey Rocha   2021  8:45 AM SCHEDULE, [address] [place] [address]   2021  9:00 AM [address] Apalya Paula, PT STRZ PT [facility] HOD   2021  8:45 AM SCHEDULE, Cherl Cera LINAC [address] HOD   2021  8:45 AM SCHEDULE, Cherl Cera LINAC [address]   2021  8:45 AM SCHEDULE, Cherl Cera LIN[facility] [address]   2021  8:45 AM SCHEDULE, Cherl Cera LIN[facility] [address]   2021  8:45 AM SCHEDULE, Cherl Cera LINAC [address]   2021  9:00 AM Brigitte Amezquita PTA STRZ PT [place] HOD   2021  9:45 AM [address] Apalya Paula PT [facility] PT [place] HOD   2021  1:30 PM MD Dominic Donovan Surg P - [address] [facility] [address], LPN

## 2021-06-22 ENCOUNTER — HOSPITAL ENCOUNTER (OUTPATIENT)
Dept: PHYSICAL THERAPY | Age: 61
Setting detail: THERAPIES SERIES
Discharge: HOME | End: 2021-06-22
Payer: MEDICARE

## 2021-06-22 ENCOUNTER — HOSPITAL ENCOUNTER (OUTPATIENT)
Dept: RADIATION ONCOLOGY | Age: 61
Discharge: HOME | End: 2021-06-22
Attending: RADIOLOGY
Payer: MEDICARE

## 2021-06-22 PROCEDURE — 77387 GUIDANCE FOR RADJ TX DLVR: CPT | Performed by: RADIOLOGY

## 2021-06-22 PROCEDURE — 77412 RADIATION TX DELIVERY LVL 3: CPT | Performed by: RADIOLOGY

## 2021-06-22 PROCEDURE — 97110 THERAPEUTIC EXERCISES: CPT

## 2021-06-22 NOTE — PROGRESS NOTES
Shoulder retractions 10        L upper trap stretch 15 sec x3        L levator stretch 15 sec x3                 Specific Interventions for Next Treatment:  PORi protocol for gentle manual lymphatic drainage and myofascial release, gentle stretches, core strengthening, conditioning, L shoulder ROM, posture, lymphedema education as needed    Activity Tolerance: Patient tolerated treatment well    ASSESSMENT:  Assessment: Pt without complaints during session despite initial high reports of LBP. GOALS:  Patient Goal: Improve pain in LB, less fatigue     Short Term Goals to be met in 4 weeks:  1. Pt to demo L shoulder strength improved from 3+/5 to 4/5 for improved ease with carrying laundry. 2. Pt to demo L hip strength improved from 4/5 to 4+/5 for improved ease with sit to stand transfers. 3. Pt demo reaching overhead without increased L shoulder pain for grooming tasks.     Long Term Goals to be met in 8 weeks:   1. Pt to demo BFI score improved from 6.1 to 3 for improved tolerance of household tasks. 2. Pt to demo sustained B LE girth with independence of lymphedema risk reduction strategies for safety after discharge. 3. Pt to demo independence with finalized HEP for improved ability to complete IADLs. Patient Education: Access Code: [email or web address]  URL: [email or web address]/  Date: 06/22/2021  Prepared by: Malena Burch    Exercises  Supine Bridge - 2 x daily - 7 x weekly - 1 sets - 10 reps  Sidelying Hip Abduction - 2 x daily - 7 x weekly - 1 sets - 10 reps  Standing March with Counter Support - 2 x daily - 7 x weekly - 1 sets - 10 reps  Standing Hip Extension with Counter Support - 2 x daily - 7 x weekly - 1 sets - 10 reps  Standing Hip Flexion with Counter Support - 2 x daily - 7 x weekly - 1 sets - 10 reps  Standing Hip Abduction with Counter Support - 2 x daily - 7 x weekly - 1 sets - 10 reps  Heel Toe Raises with Counter Support - 2 x daily - 7 x weekly - 1 sets - 10 reps  Mini Squat with

## 2021-06-23 ENCOUNTER — HOSPITAL ENCOUNTER (OUTPATIENT)
Dept: RADIATION ONCOLOGY | Age: 61
Discharge: HOME | End: 2021-06-23
Attending: RADIOLOGY
Payer: MEDICARE

## 2021-06-23 PROCEDURE — 77387 GUIDANCE FOR RADJ TX DLVR: CPT | Performed by: RADIOLOGY

## 2021-06-23 PROCEDURE — 77412 RADIATION TX DELIVERY LVL 3: CPT | Performed by: RADIOLOGY

## 2021-06-24 ENCOUNTER — HOSPITAL ENCOUNTER (OUTPATIENT)
Dept: RADIATION ONCOLOGY | Age: 61
Discharge: HOME | End: 2021-06-24
Attending: RADIOLOGY
Payer: MEDICARE

## 2021-06-24 PROCEDURE — 77412 RADIATION TX DELIVERY LVL 3: CPT | Performed by: RADIOLOGY

## 2021-06-24 PROCEDURE — 77387 GUIDANCE FOR RADJ TX DLVR: CPT | Performed by: RADIOLOGY

## 2021-06-24 PROCEDURE — 77427 RADIATION TX MANAGEMENT X5: CPT | Performed by: RADIOLOGY

## 2021-06-25 ENCOUNTER — HOSPITAL ENCOUNTER (OUTPATIENT)
Dept: RADIATION ONCOLOGY | Age: 61
Discharge: HOME | End: 2021-06-25
Attending: RADIOLOGY
Payer: MEDICARE

## 2021-06-25 ENCOUNTER — APPOINTMENT (OUTPATIENT)
Dept: RADIATION ONCOLOGY | Age: 61
End: 2021-06-25
Attending: RADIOLOGY
Payer: MEDICARE

## 2021-06-25 ENCOUNTER — CLINICAL DOCUMENTATION (OUTPATIENT)
Dept: RADIATION ONCOLOGY | Age: 61
End: 2021-06-25

## 2021-06-25 PROCEDURE — 57156 INS VAG BRACHYTX DEVICE: CPT | Performed by: RADIOLOGY

## 2021-06-25 PROCEDURE — 77770 HDR RDNCL NTRSTL/ICAV BRCHTX: CPT | Performed by: RADIOLOGY

## 2021-06-25 PROCEDURE — C1717 BRACHYTX, NON-STR,HDR IR-192: HCPCS | Performed by: RADIOLOGY

## 2021-06-25 PROCEDURE — 77332 RADIATION TREATMENT AID(S): CPT | Performed by: RADIOLOGY

## 2021-06-25 PROCEDURE — 77470 SPECIAL RADIATION TREATMENT: CPT | Performed by: RADIOLOGY

## 2021-06-25 PROCEDURE — C1728 CATH, BRACHYTX SEED ADM: HCPCS | Performed by: RADIOLOGY

## 2021-06-28 ENCOUNTER — APPOINTMENT (OUTPATIENT)
Dept: RADIATION ONCOLOGY | Age: 61
End: 2021-06-28
Attending: RADIOLOGY
Payer: MEDICARE

## 2021-06-28 NOTE — PROGRESS NOTES
[address] [place] [address] P[place]  [facility] [address]  Phone: [phone]   Toll Free: [phone]   Fax: [phone]    RADIATION ONCOLOGY  WEEKLY ON-TREATMENT ASSESSMENT NOTE    PATIENT: Remedios Elmore OF BIRTH: 1960  CSN: [id number]  DATE: 6/25/2021    RADIATION ONCOLOGY ATTENDING  Mercedez Chauhan MD, PhD       DIAGNOSIS:  Cancer Staging  Endometrial cancer [facility])  Staging form: Corpus Uteri - Carcinoma And Carcinosarcoma, AJCC 8th Edition  - Clinical: No stage assigned - Unsigned  - Pathologic stage from 4/16/2021: FIGO Stage II (pT2, pN0, cM0) - Signed by Mercedez Chauhan MD on 4/16/2021  Staging comments: ENDOMETROID ADENOCARCINOMA, FIGO GRADE 2      TREATMENT SITE:   Site. Summary: HDR brachytherapy boost to the vaginal cuff  Current Total Dose(cGy): 500 cGy of planned 1500 cGy  Current Fraction: first of 3 planned brachytherapy fractions  Final/Cumulative Rx. Dose (cGy): 4500 cGy external beam followed by 1500 cGy brachytherapy boost    SUBJECTIVE:   No complaints relating to current radiotherapy. Continues normal activities. MEDICATIONS:     Current Outpatient Medications   Medication Sig Dispense Refill    ondansetron (ZOFRAN-ODT) 4 MG disintegrating tablet Take 1 tablet by mouth every 8 hours as needed for Nausea or Vomiting 12 tablet 0    polyethylene glycol (GLYCOLAX) 17 g packet Take 17 g by mouth daily 527 g 0    cyclobenzaprine (FLEXERIL) 10 MG tablet Take 10 mg by mouth 3 times daily as needed for Muscle spasms (Patient not taking: Reported on 5/20/2021)      oxybutynin (DITROPAN) 5 MG tablet Take 1 tablet by mouth 2 times daily (Patient not taking: Reported on 6/4/2021) 60 tablet 5     No current facility-administered medications for this visit. * New    PHYSICAL EXAM:     ECOG performance Status: 0-1   Pain Score: 0/10  Vitals: There were no vitals filed for this visit.   General: Well-developed adult female NAD, AO x 3, Mentation is clear with appropriate affect. HEENT:   EOMI, oral mucosa without lesion or exudate, tongue mid-line  Thorax:   Lungs clear,  COR:  Non-tachycardic  Abdomen: Active bowel sounds present, soft, NT/ND, no rebound  /Rectal:  No perianal/perineal skin breakdown. Neuro:   Cranial nerves grossly intact; no focal deficits  Skin - treatment portal:  unremarkable     Systemic Therapy: none currently     Treatment Imaging: na (HDR brachii therapy)    ASSESSMENT:  No unexpected radiation side effects. New medications, diagnostic results: na    PLAN: Again reviewed potential side effects of radiation for the patient's treatment. Continue local/topical care as instructed. Continue current radiation course as prescribed.

## 2021-06-29 ENCOUNTER — APPOINTMENT (OUTPATIENT)
Dept: RADIATION ONCOLOGY | Age: 61
End: 2021-06-29
Attending: RADIOLOGY
Payer: MEDICARE

## 2021-06-30 ENCOUNTER — OFFICE VISIT (OUTPATIENT)
Dept: FAMILY MEDICINE CLINIC | Age: 61
End: 2021-06-30
Payer: MEDICARE

## 2021-06-30 VITALS
DIASTOLIC BLOOD PRESSURE: 82 MMHG | WEIGHT: 202.2 LBS | SYSTOLIC BLOOD PRESSURE: 126 MMHG | BODY MASS INDEX: 40.76 KG/M2 | HEART RATE: 75 BPM | OXYGEN SATURATION: 97 % | TEMPERATURE: 98.7 F | RESPIRATION RATE: 16 BRPM | HEIGHT: 59 IN

## 2021-06-30 DIAGNOSIS — Z12.11 SCREEN FOR COLON CANCER: ICD-10-CM

## 2021-06-30 DIAGNOSIS — N39.41 URGE INCONTINENCE OF URINE: ICD-10-CM

## 2021-06-30 DIAGNOSIS — K56.609 SMALL BOWEL OBSTRUCTION (HCC): Primary | ICD-10-CM

## 2021-06-30 DIAGNOSIS — C54.1 ENDOMETRIAL CANCER (HCC): ICD-10-CM

## 2021-06-30 DIAGNOSIS — M62.830 MUSCLE SPASM OF BACK: ICD-10-CM

## 2021-06-30 DIAGNOSIS — R19.5 POSITIVE FIT (FECAL IMMUNOCHEMICAL TEST): ICD-10-CM

## 2021-06-30 DIAGNOSIS — Z12.31 ENCOUNTER FOR SCREENING MAMMOGRAM FOR MALIGNANT NEOPLASM OF BREAST: ICD-10-CM

## 2021-06-30 PROCEDURE — 1111F DSCHRG MED/CURRENT MED MERGE: CPT | Performed by: STUDENT IN AN ORGANIZED HEALTH CARE EDUCATION/TRAINING PROGRAM

## 2021-06-30 PROCEDURE — 99214 OFFICE O/P EST MOD 30 MIN: CPT | Performed by: STUDENT IN AN ORGANIZED HEALTH CARE EDUCATION/TRAINING PROGRAM

## 2021-06-30 RX ORDER — CYCLOBENZAPRINE HCL 10 MG
10 TABLET ORAL 3 TIMES DAILY PRN
Qty: 30 TABLET | Refills: 1 | Status: SHIPPED | OUTPATIENT
Start: 2021-06-30 | End: 2022-05-10 | Stop reason: SDUPTHER

## 2021-06-30 RX ORDER — OXYBUTYNIN CHLORIDE 5 MG/1
5 TABLET ORAL 2 TIMES DAILY
Qty: 60 TABLET | Refills: 5 | Status: SHIPPED | OUTPATIENT
Start: 2021-06-30 | End: 2021-12-14 | Stop reason: SDUPTHER

## 2021-06-30 SDOH — ECONOMIC STABILITY: FOOD INSECURITY: WITHIN THE PAST 12 MONTHS, YOU WORRIED THAT YOUR FOOD WOULD RUN OUT BEFORE YOU GOT MONEY TO BUY MORE.: NEVER TRUE

## 2021-06-30 SDOH — ECONOMIC STABILITY: FOOD INSECURITY: WITHIN THE PAST 12 MONTHS, THE FOOD YOU BOUGHT JUST DIDN'T LAST AND YOU DIDN'T HAVE MONEY TO GET MORE.: NEVER TRUE

## 2021-06-30 ASSESSMENT — ENCOUNTER SYMPTOMS
ABDOMINAL PAIN: 0
NAUSEA: 0
VOMITING: 0
TROUBLE SWALLOWING: 0
CONSTIPATION: 0
COUGH: 0
BLOOD IN STOOL: 0
SHORTNESS OF BREATH: 0
DIARRHEA: 0
EYE PAIN: 0

## 2021-06-30 ASSESSMENT — PATIENT HEALTH QUESTIONNAIRE - PHQ9
SUM OF ALL RESPONSES TO PHQ9 QUESTIONS 1 & 2: 2
SUM OF ALL RESPONSES TO PHQ QUESTIONS 1-9: 2
2. FEELING DOWN, DEPRESSED OR HOPELESS: 1
1. LITTLE INTEREST OR PLEASURE IN DOING THINGS: 1

## 2021-06-30 ASSESSMENT — SOCIAL DETERMINANTS OF HEALTH (SDOH): HOW HARD IS IT FOR YOU TO PAY FOR THE VERY BASICS LIKE FOOD, HOUSING, MEDICAL CARE, AND HEATING?: HARD

## 2021-06-30 NOTE — PROGRESS NOTES
[address] [facility] [address] [facility]  Dept: [phone]  Dept Fax: [phone]: [address] or Hospital Follow Up      Nayan Kemp   YOB: 1960    Date of Office Visit:  6/30/2021  Date of Hospital Admission: 6/17/21  Date of Hospital Discharge: 6/18/21  Readmission Risk Score(high >=14%.  Medium >=10%):Readmission Risk Score: 10      Care management risk score Rising risk (score 2-5) and Complex Care (Scores >=6): 4     Non face to face  following discharge, date last encounter closed (first attempt may have been earlier): 6/21/2021 10:10 AM 6/21/2021 10:10 AM    Call initiated 2 business days of discharge: Yes     Patient Active Problem List   Diagnosis    HTN (hypertension)    Hyperopia of both eyes with astigmatism and presbyopia    Urge incontinence of urine    BMI 40.0-44.9, adult ([address]    Stress    PMB (postmenopausal bleeding)    Endometrial cancer ([address]    SBO (small bowel obstruction) ([address]       No Known Allergies    Medications listed as ordered at the time of discharge from hospital   [place], [address] [place] [address] Medication Instructions JANNY:    Printed on:06/30/21 4703   Medication Information                      cyclobenzaprine (FLEXERIL) 10 MG tablet  Take 1 tablet by mouth 3 times daily as needed for Muscle spasms             ondansetron (ZOFRAN-ODT) 4 MG disintegrating tablet  Take 1 tablet by mouth every 8 hours as needed for Nausea or Vomiting             oxybutynin (DITROPAN) 5 MG tablet  Take 1 tablet by mouth 2 times daily             polyethylene glycol (GLYCOLAX) 17 g packet  Take 17 g by mouth daily                   Medications marked \"taking\" at this time  Outpatient Medications Marked as Taking for the 6/30/21 encounter (Office Visit) with Jenniffer Myers, DO   Medication Sig Dispense Refill    cyclobenzaprine (FLEXERIL) 10 MG tablet Take 1 tablet by mouth 3 times daily as needed for Muscle spasms 30 tablet 1    oxybutynin (DITROPAN) 5 MG tablet Take 1 tablet by mouth 2 times daily 60 tablet 5    ondansetron (ZOFRAN-ODT) 4 MG disintegrating tablet Take 1 tablet by mouth every 8 hours as needed for Nausea or Vomiting 12 tablet 0    polyethylene glycol (GLYCOLAX) 17 g packet Take 17 g by mouth daily 527 g 0        Medications patient taking as of now reconciled against medications ordered at time of hospital discharge: Yes    Chief Complaint   Patient presents with    Follow-Up from Hospital     small bowel obstruction    Other     toenail fungus    Health Maintenance     discuss cologuard       HPI    Inpatient course: Discharge summary reviewed- see chart. Interval history/Current status: Doing well post discharge. No return in symptoms of small bowel obstruction. Will occasionally have mild abdominal cramping for around 5-10 minutes but this is nonspecific and self resolves. Denies any other issues at this time. Currently undergoing radiation therapy for endometrial cancer. Had hysterectomy in March of 2021. Has 2 more radiation treatments left. Hx of urge incontinence. Improved with ditropan. Would like to restart this medication. Hx of muscle spasms in her back. Uses flexeril as needed. Needs colon cancer screening and breast cancer screening. Review of Systems   Constitutional: Negative for chills, fatigue and fever. HENT: Negative for ear pain, postnasal drip and trouble swallowing. Eyes: Negative for pain and visual disturbance. Respiratory: Negative for cough and shortness of breath. Cardiovascular: Negative for chest pain and palpitations. Gastrointestinal: Negative for abdominal pain, blood in stool, constipation, diarrhea, nausea and vomiting. Genitourinary: Negative for dysuria and urgency. Skin: Negative for rash and wound.    Neurological: Negative for dizziness and headaches. Psychiatric/Behavioral: Negative for dysphoric mood. The patient is not nervous/anxious. Vitals:    06/30/21 1429   BP: 126/82   Site: Left Upper Arm   Position: Sitting   Cuff Size: Medium Adult   Pulse: 75   Resp: 16   Temp: 98.7 °F (37.1 °C)   TempSrc: Oral   SpO2: 97%   Weight: 202 lb 3.2 oz (91.7 kg)   Height: 4' 11\" (1.499 m)     Body mass index is 40.84 kg/m². Wt Readings from Last 3 Encounters:   06/30/21 202 lb 3.2 oz (91.7 kg)   06/24/21 203 lb 4.2 oz (92.2 kg)   06/17/21 208 lb 4.8 oz (94.5 kg)     BP Readings from Last 3 Encounters:   06/30/21 126/82   06/24/21 (!) 127/91   06/18/21 138/70       Physical Exam  Vitals and nursing note reviewed. Constitutional:       General: She is not in acute distress. Appearance: She is well-developed. She is not diaphoretic. HENT:      Head: Normocephalic and atraumatic. Right Ear: External ear normal.      Left Ear: External ear normal.      Nose: Nose normal.   Eyes:      General: No scleral icterus. Right eye: No discharge. Left eye: No discharge. Conjunctiva/sclera: Conjunctivae normal.   Cardiovascular:      Rate and Rhythm: Normal rate and regular rhythm. Heart sounds: Normal heart sounds. No murmur heard. Pulmonary:      Effort: Pulmonary effort is normal.      Breath sounds: Normal breath sounds. Musculoskeletal:      Cervical back: Normal range of motion. Skin:     General: Skin is warm and dry. Findings: No erythema or rash. Neurological:      Mental Status: She is alert and oriented to person, place, and time. Cranial Nerves: No cranial nerve deficit. Psychiatric:         Behavior: Behavior normal.         Thought Content: Thought content normal.         Judgment: Judgment normal.             Assessment/Plan:  1. Small bowel obstruction (HCC)  No changes at this time. Follow up with Gen surg as scheduled.    - OK DISCHARGE MEDS RECONCILED W/ CURRENT OUTPATIENT MED

## 2021-06-30 NOTE — PROGRESS NOTES
S: 61 y.o. female with   Chief Complaint   Patient presents with    Follow-Up from Hospital     small bowel obstruction    Other     toenail fungus    Health Maintenance     discuss cologuard       HPI: please see resident note for HPI and ROS. BP Readings from Last 3 Encounters:   06/30/21 126/82   06/24/21 (!) 127/91   06/18/21 138/70     Wt Readings from Last 3 Encounters:   06/30/21 202 lb 3.2 oz (91.7 kg)   06/24/21 203 lb 4.2 oz (92.2 kg)   06/17/21 208 lb 4.8 oz (94.5 kg)       O: VS:  height is 4' 11\" (1.499 m) and weight is 202 lb 3.2 oz (91.7 kg). Her oral temperature is 98.7 °F (37.1 °C). Her blood pressure is 126/82 and her pulse is 75. Her respiration is 16 and oxygen saturation is 97%. AAO/NAD, appropriate affect for mood       Diagnosis Orders   1. Small bowel obstruction ([address]  IL DISCHARGE MEDS RECONCILED W/ CURRENT OUTPATIENT MED LIST   2. Urge incontinence of urine  oxybutynin (DITROPAN) 5 MG tablet   3. Encounter for screening mammogram for malignant neoplasm of breast  MEHREEN DIGITAL SCREEN W OR WO CAD BILATERAL   4. Screen for colon cancer  POCT Fecal Immunochemical Test (FIT)       Plan:   Resolved SBO. F/u with gen surgery. Continue ditropan 5mg for OAB. FIT test ordered. Continue flexeril prn. Mammogram ordered. Health Maintenance Due   Topic Date Due    COVID-19 Vaccine (1) Never done    Colon Cancer Screen FIT/FOBT  06/17/2017    Breast cancer screen  11/05/2018       Attending Physician Statement  I have discussed the case, including pertinent history and exam findings with the resident. I also have seen the patient and performed key portions of the examination. I agree with the documented assessment and plan as documented by the resident.         Samuel Rowe DO 6/30/2021 2:57 PM

## 2021-06-30 NOTE — PROGRESS NOTES
Health Maintenance Due   Topic Date Due    COVID-19 Vaccine (1) Never done    Colon Cancer Screen FIT/FOBT  06/17/2017    Breast cancer screen  11/05/2018     Pt aware.

## 2021-07-02 ENCOUNTER — HOSPITAL ENCOUNTER (OUTPATIENT)
Dept: RADIATION ONCOLOGY | Age: 61
Discharge: HOME | End: 2021-07-02
Attending: RADIOLOGY
Payer: MEDICARE

## 2021-07-02 ENCOUNTER — HOSPITAL ENCOUNTER (OUTPATIENT)
Age: 61
Discharge: HOME | End: 2021-07-02
Payer: MEDICARE

## 2021-07-02 DIAGNOSIS — Z12.11 SCREEN FOR COLON CANCER: ICD-10-CM

## 2021-07-02 PROCEDURE — C1717 BRACHYTX, NON-STR,HDR IR-192: HCPCS | Performed by: RADIOLOGY

## 2021-07-02 PROCEDURE — 82274 ASSAY TEST FOR BLOOD FECAL: CPT

## 2021-07-02 PROCEDURE — 57156 INS VAG BRACHYTX DEVICE: CPT | Performed by: RADIOLOGY

## 2021-07-02 PROCEDURE — 77770 HDR RDNCL NTRSTL/ICAV BRCHTX: CPT | Performed by: RADIOLOGY

## 2021-07-06 ENCOUNTER — HOSPITAL ENCOUNTER (OUTPATIENT)
Dept: RADIATION ONCOLOGY | Age: 61
Discharge: HOME | End: 2021-07-06
Attending: RADIOLOGY
Payer: MEDICARE

## 2021-07-06 LAB
CONTROL: ABNORMAL
HEMOCCULT STL QL: POSITIVE

## 2021-07-06 PROCEDURE — C1717 BRACHYTX, NON-STR,HDR IR-192: HCPCS | Performed by: RADIOLOGY

## 2021-07-06 PROCEDURE — 77770 HDR RDNCL NTRSTL/ICAV BRCHTX: CPT | Performed by: RADIOLOGY

## 2021-07-06 PROCEDURE — 57156 INS VAG BRACHYTX DEVICE: CPT | Performed by: RADIOLOGY

## 2021-07-07 PROCEDURE — 77336 RADIATION PHYSICS CONSULT: CPT | Performed by: RADIOLOGY

## 2021-07-08 ENCOUNTER — APPOINTMENT (OUTPATIENT)
Dept: PHYSICAL THERAPY | Age: 61
End: 2021-07-08
Payer: MEDICARE

## 2021-07-08 ENCOUNTER — HOSPITAL ENCOUNTER (OUTPATIENT)
Dept: GENERAL RADIOLOGY | Age: 61
Discharge: HOME | End: 2021-07-08
Payer: COMMERCIAL

## 2021-07-08 ENCOUNTER — HOSPITAL ENCOUNTER (OUTPATIENT)
Age: 61
Discharge: HOME | End: 2021-07-08
Payer: COMMERCIAL

## 2021-07-08 DIAGNOSIS — Z02.71 ENCOUNTER FOR DISABILITY DETERMINATION: ICD-10-CM

## 2021-07-08 PROCEDURE — 72100 X-RAY EXAM L-S SPINE 2/3 VWS: CPT

## 2021-07-20 ENCOUNTER — HOSPITAL ENCOUNTER (OUTPATIENT)
Dept: WOMENS IMAGING | Age: 61
Discharge: HOME | End: 2021-07-20
Payer: MEDICARE

## 2021-07-20 DIAGNOSIS — Z12.31 ENCOUNTER FOR SCREENING MAMMOGRAM FOR MALIGNANT NEOPLASM OF BREAST: ICD-10-CM

## 2021-07-20 PROCEDURE — 77063 BREAST TOMOSYNTHESIS BI: CPT

## 2021-07-26 ENCOUNTER — HOSPITAL ENCOUNTER (OUTPATIENT)
Dept: PHYSICAL THERAPY | Age: 61
Setting detail: THERAPIES SERIES
Discharge: HOME | End: 2021-07-26
Payer: MEDICARE

## 2021-07-26 PROCEDURE — 97535 SELF CARE MNGMENT TRAINING: CPT

## 2021-07-26 NOTE — DISCHARGE SUMMARY
[address] [facility]  ONCOLOGY REHABILITATION  PHYSICAL THERAPY  [] DAILY NOTE [] PROGRESS NOTE [x] DISCHARGE NOTE    [x] [facility]     Date: 2021  Patient Name:  Howard Vasquez  : 1960  MRN: [id number]    Referring Practitioner NAVEED Alonso*   Diagnosis Lumbosacral plexus disorders [G54.1]    Treatment Diagnosis Chronic low back pain, L shoulder pain, cancer related fatigue, Z71.9   Date of Evaluation 21    Additional Pertinent History Anemia, incontinence, anxiety, arthritis       Functional Outcome Measure Used O: BFI   Functional Outcome Score 6.1 (21)   1.3 (21)       Insurance: Primary: Payor: Karen Whitt /  /  / ,   Secondary:    Authorization Information: Aquatics and modalities ok, no ionto, hot or cold packs   Visit # 3, 3/10 for progress note   Visits Allowed: /VWHJVX year   Recertification Date: 91   Physician Follow-Up: 21 [facility]   Physician Orders: Fatigue, weakness   History of Present Illness: 20 endrometrial/uterine CA, 3/26/21 robotic hysterectomy without lymph nodes submitted, XRT x30  - 21        SUBJECTIVE: Reports since finishing radiation, her fatigue and weakness have started to improve. Back pain continues to be an issue but has also significantly improved from usual ratings of 10/10.     TREATMENT   Precautions: Lymphedema risk   Pain: 2/10 LBP - lower left side     X in shaded column indicates activity completed today   Modalities Parameters/  Location   Notes             Manual Therapy Time/Technique   Notes             Exercise/Intervention     Notes   Nustep 5 min L2  Seat 7, arms 8; steps 382          At bar:       Marches, minisquats, heel/toe raises 10             At band:       Shoulder extension, retractions, punches  10   Peach t-band   Biceps, triceps, horizontal abduction 10   Page t-band   Long arc quads, hamstring curls, hip abduction, adduction ball squeeze 10 Peach t-band          At mat:       Sidelying hip abduction x10      Bridges x10             Retro shoulder rolls 10    Seated   Shoulder retractions 10        L upper trap stretch 15 sec x3        L levator stretch 15 sec x3                 Brief Fatigue Inventory   Throughout our lives, most of us have times when we feel very tired or fatigued. Have you felt unusually tired or fatigued in the last week? No   Scale: 0 (No Fatigue)  <<<<>>>>  10 (As Bad as You Can Imagine)   1. How would you rate your fatigue (weariness, tiredness) right NOW? 0   2. How would you rate your USUAL level of fatigue during the past 24 hours? 3   3. How would you rate your WORST level of fatigue during the past 24 hours? 7   Scale: 0 (Does Not Interfere) <<<<>>>> 10 (Completely Interferes)   4. How would you rate how your fatigue has interfered with your GENERAL ACTIVITY in the past 24 hours? 0   5. How would you rate how your fatigue has interfered with your MOOD in the past 24 hours? 2   6. How would you rate how your fatigue has interfered with your WALKING ABILITY in the past 24 hours? 0   7. How would you rate how your fatigue has interfered with your NORMAL WORK (both outside and inside the home) in the past 24 hours? 0   8. How would you rate how your fatigue has interfered with your RELATIONS WITH OTHER PEOPLE in the past 24 hours? 0   9. How would you rate how your fatigue has interfered with your ENJOYMENT OF LIFE in the past 24 hours?  0   Total Score (sum of points for all 9 items/9): 1.3   Levels of Fatigue:  0 = None  1 -3 = Mild  4 - 6 = Moderate  7 - 10 = Severe Mild   *Adapted from MD [facility] [address] [place] [address] Brief Fatigue Inventory 1997*     Lower Extremity Circumferential Measurements     Right (cm) Left (cm) Comments   Met Heads 20.5 20.4     Malleoli 19.9 19.4     Widest part of calf 35.7 37.3     Infrapatellar 38.2 38.5     Suprapatellar 49 49.8     Total 163.3 165.4 Trunk at iliac crest: NT      6/14/21: 164.7  163.6    Lymphatic system, lymphedema risk factors and risk reduction strategies reviewed with National Lymphedema Network handout provided. Discussed importance of good skin hygiene, preventing infections in leg as well as to utilize muscle-pump action of LE to stimulate lymphatic drainage during times of both inactivity and activity. Encouraged pt to avoid using saunas/hot tubs longer than 15 minutes and monitor fit of shoes and socks. Educated to importance of strengthening leg to further improve the efficiency of the muscle pump action of the LE. Discussed benefits of compression socks/garments and that purpose is to also stimulate lymphatic drainage along with maintaining size of leg. Educated that if the patient wishes to obtain a garment, the patient would need to go through a physician to get a prescription for insurance to cover garment or the patient can purchase outright. Provided with proper compression class and how to size appropriately. Also provided with handout on lymphedema compression garments and educated on when to wear garment, when to remove, general care of garment and how and where to purchase garment. Specific Interventions for Next Treatment:  N/A    Activity Tolerance: Patient tolerated treatment well    ASSESSMENT:  Assessment: Progress towards goals, improved back pain and strength. No evidence of lymphedema, lymphedema education with handout provided. Recommendation of discharge, pt in agreement. GOALS:  Patient Goal: Improve pain in LB, less fatigue     Short Term Goals to be met in 4 weeks:  1. Pt to demo L shoulder strength improved from 3+/5 to 4/5 for improved ease with carrying laundry. GOAL MET:  Pt able to demo 4/5 however has pain behaviors. Discontinue Goal    2. Pt to demo L hip strength improved from 4/5 to 4+/5 for improved ease with sit to stand transfers. GOAL MET:  Able to achieve 4+/5 but demo's pain behaviors. Discontinue Goal    3.  Pt demo reaching overhead without increased L shoulder pain for grooming tasks. GOAL MET:  Full ROM at L shoulder. Discontinue Goal     Long Term Goals to be met in 8 weeks:   1. Pt to demo BFI score improved from 6.1 to 3 for improved tolerance of household tasks. GOAL MET:  1.3. Discontinue Goal    2. Pt to demo sustained B LE girth with independence of lymphedema risk reduction strategies for safety after discharge. GOAL MET:  No significant changes in B LE girth, no evidence of edema in B LE. Cathy Money Discontinue Goal    3. Pt to demo independence with finalized HEP for improved ability to complete IADLs. GOAL MET:  Pt reports can perform HEP independently but has not complied. States she has the HEP available so that if she changes her mind, she can perform them then. .  Discontinue Goal    Patient Education: Lymphedema ed, progress towards goals, discharge at this time. Pt in agreement.   Education Outcome: Verbalized understanding  Education Barriers: None    PLAN: Discharge      Time In 0905   Time Out 0930   Timed Code Minutes: 25 min   Total Treatment Time: 25 min       Electronically Signed by: Emaline Lefort, PT PT, DPT, DALIA [id number] 7/26/2021

## 2021-08-02 ENCOUNTER — OFFICE VISIT (OUTPATIENT)
Dept: SURGERY | Age: 61
End: 2021-08-02
Payer: MEDICARE

## 2021-08-02 VITALS
TEMPERATURE: 97 F | DIASTOLIC BLOOD PRESSURE: 72 MMHG | BODY MASS INDEX: 40.52 KG/M2 | HEIGHT: 59 IN | WEIGHT: 201 LBS | RESPIRATION RATE: 15 BRPM | OXYGEN SATURATION: 94 % | HEART RATE: 72 BPM | SYSTOLIC BLOOD PRESSURE: 130 MMHG

## 2021-08-02 DIAGNOSIS — C54.1 ENDOMETRIAL CANCER (HCC): ICD-10-CM

## 2021-08-02 DIAGNOSIS — K80.20 CALCULUS OF GALLBLADDER WITHOUT CHOLECYSTITIS WITHOUT OBSTRUCTION: ICD-10-CM

## 2021-08-02 DIAGNOSIS — K56.609 SBO (SMALL BOWEL OBSTRUCTION) (HCC): Primary | ICD-10-CM

## 2021-08-02 PROCEDURE — 3017F COLORECTAL CA SCREEN DOC REV: CPT | Performed by: SURGERY

## 2021-08-02 PROCEDURE — 1111F DSCHRG MED/CURRENT MED MERGE: CPT | Performed by: SURGERY

## 2021-08-02 PROCEDURE — 99214 OFFICE O/P EST MOD 30 MIN: CPT | Performed by: SURGERY

## 2021-08-02 PROCEDURE — G8417 CALC BMI ABV UP PARAM F/U: HCPCS | Performed by: SURGERY

## 2021-08-02 PROCEDURE — 1036F TOBACCO NON-USER: CPT | Performed by: SURGERY

## 2021-08-02 PROCEDURE — G8427 DOCREV CUR MEDS BY ELIG CLIN: HCPCS | Performed by: SURGERY

## 2021-08-06 ASSESSMENT — ENCOUNTER SYMPTOMS
SORE THROAT: 0
VOICE CHANGE: 0
TROUBLE SWALLOWING: 0
VOMITING: 0
ABDOMINAL PAIN: 0
NAUSEA: 0
SHORTNESS OF BREATH: 0
CONSTIPATION: 0
WHEEZING: 0
BLOOD IN STOOL: 0
COLOR CHANGE: 0
COUGH: 0

## 2021-08-17 ENCOUNTER — HOSPITAL ENCOUNTER (OUTPATIENT)
Dept: RADIATION ONCOLOGY | Age: 61
Discharge: HOME | End: 2021-08-17
Attending: RADIOLOGY
Payer: MEDICARE

## 2021-08-17 VITALS
SYSTOLIC BLOOD PRESSURE: 166 MMHG | BODY MASS INDEX: 40.96 KG/M2 | TEMPERATURE: 98.5 F | OXYGEN SATURATION: 98 % | HEART RATE: 76 BPM | RESPIRATION RATE: 16 BRPM | WEIGHT: 202.8 LBS | DIASTOLIC BLOOD PRESSURE: 95 MMHG

## 2021-08-17 PROCEDURE — 99215 OFFICE O/P EST HI 40 MIN: CPT | Performed by: NURSE PRACTITIONER

## 2021-08-17 PROCEDURE — 99212 OFFICE O/P EST SF 10 MIN: CPT | Performed by: RADIOLOGY

## 2021-08-17 NOTE — PROGRESS NOTES
[address] [facility], [address] [place]  Phone: [phone]   Toll Free: [phone]   Fax: [phone]    RADIATION ONCOLOGY FOLLOW UP REPORT    PATIENT NAME:  Seth Garrett     : 1960  MEDICAL RECORD NO: [id number]    LOCATION: [facility] NO: [id number]      PROVIDER: JOANNA Mejía CNP        DATE OF SERVICE: 2021    FOLLOW UP PHYSICIANS: Dom Dickey; Claudetta Kindred; Britney Schulz    DIAGNOSIS: C54.1 -- Malignant neoplasm of the endometrium; Adenocarcinoma, endometroid type, FIGO Grade 2; pT2 N0 M0, Stage II  [Invades outer half of myometrium; +cervical stromal involvement; nodes negative; margins clear]    DATE OF DIAGNOSIS: 2020    END OF TREATMENT DATE: 2021    ECOG PERFORMANCE STATUS: 0    PAIN: Denies    CHAPERONE: Declined      HPI: Jonathan Le is an extremely nice 72-year-old woman who presented with vaginal bleeding. The bleeding was concerning, due to the patient age, and she also was found on PAP to have KARI. Cervix biopsy with fractional D&C was completed 2020. There were focal atypical glands seen in the endocervical curettage, and the endometrial biopsy showed extensive stromal breakdown with glandular crowding and squamous morules, additional evaluation was recommended, and endometrial biopsy was performed 2020. The biopsy confirmed the presence of endometrial adenocarcinoma. Teresa Linton underwent gynecologic oncology evaluation. In accordance with clinical practice guidelines, she received a recommendation for definitive surgery. On 3/26/2021, she underwent surgical staging. Final pathology showed FIGO grade 2 endometrioid adenocarcinoma invading through 57% of the myometrial thickness. Cervical stromal involvement was present, but there was no mention of any lymphatic/vascular space involvement. The closest surgical margin was at the vaginal cuff, measuring 5 mm.   During her 06/18/2021    ALKPHOS 79 06/18/2021    AST 10 06/18/2021    ALT <5 06/18/2021       RADIOLOGY RESULTS:   7/20/2021: Bilateral screening mammogram:   Impression   No mammographic evidence of malignancy. A 1 year screening mammogram is recommended. MEDICATIONS:   Current Outpatient Medications   Medication Sig Dispense Refill    PEG-KCl-NaCl-NaSulf-Na Asc-C (PLENVU) 140 g SOLR Take 1 kit by mouth See Admin Instructions For the pharmacy: If the patient has coverage, submit the balance due to [facility] as a secondary payer using [facility] 45 [phone] and valid Other Coverage Code ([address] [facility]) 08. (Patient not taking: Reported on 8/2/2021) 1 each 0    cyclobenzaprine (FLEXERIL) 10 MG tablet Take 1 tablet by mouth 3 times daily as needed for Muscle spasms 30 tablet 1    oxybutynin (DITROPAN) 5 MG tablet Take 1 tablet by mouth 2 times daily 60 tablet 5     No current facility-administered medications for this encounter. ROS: As noted in the HPI and Interval history, otherwise negative. EXAMINATION:   GENERAL: Nay Delcid is a pleasant well developed adult female. She is alert and oriented x3 in no acute distress. VITAL SIGNS:  Weight 92kg, Temperature 98.5 F, Pulse 76, /95, Respirations 16, Pulse ox 98% room air. HEENT: Normocephalic, atraumatic. PERRL. EOMI. Ears, nose and lips are within normal limits on external examination. Oropharynx unremarkable. NECK: Without palpable cervical adenopathy   LYMPH: Without palpable supraclavicular or axillary adenopathy. LUNGS: Clear without adventitious sounds. Respirations easy and unlabored. HEART: Regular rate and rhythm without murmur. ABDOMEN: Soft non-tender, non-distended. Active bowel sounds x4. EXTREMITIES: Without clubbing or cyanosis. No edema noted. NEUROLOGIC EXAMINATION: Cranial nerves grossly intact. ASSESSMENT: Nay Delcid was diagnosed with endometrial cancer in December 2020.  She underwent treatment with surgery and radiation therapy. Mariangel Granados is recovering from treatment. She did develop small bowel obstruction during the radiation treatment. She now has formed, soft stools instead of loose stools. She is scheduled to have a colonoscopy on 8/26/21. She does not have concerning on today's physical exam. Discussed with her today the use of vaginal dilator 3 times per week. This will allow for less discomfort during exams on future appointments. Also arranged for her to meet with Amanda to assist her with disability. PLAN:  1. Recovering well from radiation, still with fatigue and weakness  2. S/p treatment from SBO. Now with soft, formed stools. 3. Vaginal dilator: discussed using 3 times per week. 4. Colonoscopy scheduled for 8/26/21  5. Continue follow up with other providers as scheduled. 6. Follow up here in 3 months. 9. Mariangel Granados is aware she can call for questions, concerns or changes in condition. Electronically signed by JOANNA Yanes CNP on 8/17/21 at 11:34 AM EDT    ATTESTATION:  I have spent 45 minutes reviewing previous notes, test results and face to face with the patient discussing the diagnosis and importance of compliance with the treatment plan as well as documenting on the day of the visit.     CC: Dom Arreola; Azalia Goldstein; Jc Delvalle

## 2021-09-17 ENCOUNTER — NURSE ONLY (OUTPATIENT)
Dept: LAB | Age: 61
End: 2021-09-17

## 2021-09-28 ENCOUNTER — OFFICE VISIT (OUTPATIENT)
Dept: FAMILY MEDICINE CLINIC | Age: 61
End: 2021-09-28
Payer: MEDICARE

## 2021-09-28 ENCOUNTER — HOSPITAL ENCOUNTER (OUTPATIENT)
Dept: GENERAL RADIOLOGY | Age: 61
Discharge: HOME | End: 2021-09-28
Payer: MEDICARE

## 2021-09-28 ENCOUNTER — HOSPITAL ENCOUNTER (OUTPATIENT)
Age: 61
Discharge: HOME | End: 2021-09-28
Payer: MEDICARE

## 2021-09-28 VITALS
TEMPERATURE: 97.7 F | SYSTOLIC BLOOD PRESSURE: 126 MMHG | RESPIRATION RATE: 16 BRPM | WEIGHT: 203.8 LBS | HEIGHT: 59 IN | BODY MASS INDEX: 41.08 KG/M2 | DIASTOLIC BLOOD PRESSURE: 84 MMHG | OXYGEN SATURATION: 99 % | HEART RATE: 88 BPM

## 2021-09-28 DIAGNOSIS — R10.84 GENERALIZED ABDOMINAL PAIN: Primary | ICD-10-CM

## 2021-09-28 DIAGNOSIS — R10.84 GENERALIZED ABDOMINAL PAIN: ICD-10-CM

## 2021-09-28 DIAGNOSIS — C54.1 ENDOMETRIAL CANCER (HCC): ICD-10-CM

## 2021-09-28 DIAGNOSIS — Z87.19 HX SBO: ICD-10-CM

## 2021-09-28 PROCEDURE — G8417 CALC BMI ABV UP PARAM F/U: HCPCS | Performed by: STUDENT IN AN ORGANIZED HEALTH CARE EDUCATION/TRAINING PROGRAM

## 2021-09-28 PROCEDURE — 93000 ELECTROCARDIOGRAM COMPLETE: CPT | Performed by: STUDENT IN AN ORGANIZED HEALTH CARE EDUCATION/TRAINING PROGRAM

## 2021-09-28 PROCEDURE — 1036F TOBACCO NON-USER: CPT | Performed by: STUDENT IN AN ORGANIZED HEALTH CARE EDUCATION/TRAINING PROGRAM

## 2021-09-28 PROCEDURE — 3017F COLORECTAL CA SCREEN DOC REV: CPT | Performed by: STUDENT IN AN ORGANIZED HEALTH CARE EDUCATION/TRAINING PROGRAM

## 2021-09-28 PROCEDURE — G8427 DOCREV CUR MEDS BY ELIG CLIN: HCPCS | Performed by: STUDENT IN AN ORGANIZED HEALTH CARE EDUCATION/TRAINING PROGRAM

## 2021-09-28 PROCEDURE — 99214 OFFICE O/P EST MOD 30 MIN: CPT | Performed by: STUDENT IN AN ORGANIZED HEALTH CARE EDUCATION/TRAINING PROGRAM

## 2021-09-28 PROCEDURE — 74018 RADEX ABDOMEN 1 VIEW: CPT

## 2021-09-28 RX ORDER — ONDANSETRON 4 MG/1
4 TABLET, FILM COATED ORAL 3 TIMES DAILY PRN
Qty: 15 TABLET | Refills: 0 | Status: SHIPPED | OUTPATIENT
Start: 2021-09-28 | End: 2022-05-10 | Stop reason: ALTCHOICE

## 2021-09-28 ASSESSMENT — ENCOUNTER SYMPTOMS
VOMITING: 0
EYE PAIN: 0
DIARRHEA: 0
TROUBLE SWALLOWING: 0
ABDOMINAL PAIN: 1
CONSTIPATION: 0
NAUSEA: 1
SHORTNESS OF BREATH: 0
BLOOD IN STOOL: 0
COUGH: 0

## 2021-09-28 NOTE — PROGRESS NOTES
[address] [address] [facility] [address]  Dept: [phone]  Dept Fax: [phone]  Loc: Kendy Gifford is a 64 y.o. female who presents today for:  Chief Complaint   Patient presents with    3 Month Follow-Up     Chronic Conditions and also having abdominal pains today causing nausea using Pepto Bismouth and Gingerale       Goals      Exercise 3x per week (30 min per time)           HPI:     HPI  Abdominal Pain    Prieto Arce is a 64 y.o. y/o female that presents with 3 Month Follow-Up (Chronic Conditions and also having abdominal pains today causing nausea using Pepto Bismouth and Gingerale)  . HPI:       Symptoms have been present for 1 day(s). Location:  generalized and epigastric    Description: cramping   Provoking Factors - nothing  Alleviating Factors - nothing  Severity - 4/10   Radiation: No    Change in pain with eating? Has not eaten today due to pain and nausea  Change in pain with BM? No- had 2 BM this morning  Nausea? Yes- significant nausea  Vomiting? no  Diarrhea? no  Constipation? no  Blood in Stools? no  Dysuria/Change in Urinary Frequency/Hematuria? no  Back Pain? no    Does have a significant hx of abdominal surgery and radiation. Hx of endometrial cancer. Hx of SBO in June 2021. Feels similar to this but less severe.        Past Medical History:   Diagnosis Date    Anemia     Edema     Endometrial cancer ([address]     cancer free 7/21/21    Infertility female     Obesity     SBO (small bowel obstruction) ([facility] [address]       Past Surgical History:   Procedure Laterality Date    DILATION AND CURETTAGE OF UTERUS N/A 12/30/2020    DILATATION AND CURETTAGE HYSTEROSCOPY performed by Milli Vides MD at [address] [facility] ENDOMETRIAL BIOPSY  02/2021    HYSTERECTOMY, TOTAL ABDOMINAL  03/13/2021    OVARY REMOVAL  2009 1    TX TOTAL ABDOM HYSTERECTOMY  2021     Family History   Problem Relation Age of Onset    Heart Disease Mother     Pancreatic Cancer Father     Stomach Cancer Brother     Cancer Brother         Stomach Cancer    Glaucoma Neg Hx     Colon Cancer Neg Hx     Colon Polyps Neg Hx     Esophageal Cancer Neg Hx      Social History     Tobacco Use    Smoking status: Former Smoker     Packs/day: 0.00     Years: 5.00     Pack years: 0.00     Types: Cigars     Quit date: 5/7/2011     Years since quitting: 10.4    Smokeless tobacco: Never Used   Substance Use Topics    Alcohol use: Yes     Alcohol/week: 2.0 standard drinks     Types: 1 Glasses of wine, 1 Cans of beer per week     Comment: occasional      Current Outpatient Medications   Medication Sig Dispense Refill    ondansetron (ZOFRAN) 4 MG tablet Take 1 tablet by mouth 3 times daily as needed for Nausea or Vomiting 15 tablet 0    cyclobenzaprine (FLEXERIL) 10 MG tablet Take 1 tablet by mouth 3 times daily as needed for Muscle spasms 30 tablet 1    oxybutynin (DITROPAN) 5 MG tablet Take 1 tablet by mouth 2 times daily 60 tablet 5     No current facility-administered medications for this visit. No Known Allergies    Health Maintenance   Topic Date Due    Pneumococcal 0-64 years Vaccine (1 of 4 - PCV13) Never done    Flu vaccine (1) Never done    DTaP/Tdap/Td vaccine (1 - Tdap) 11/03/2021 (Originally 8/16/1979)    Shingles Vaccine (1 of 2) 11/03/2021 (Originally 8/16/2010)    Breast cancer screen  07/20/2023    Lipid screen  10/24/2024    Colon cancer screen colonoscopy  08/26/2031    COVID-19 Vaccine  Completed    Hepatitis C screen  Completed    HIV screen  Completed    Hepatitis A vaccine  Aged Out    Hepatitis B vaccine  Aged Out    Hib vaccine  Aged Out    Meningococcal (ACWY) vaccine  Aged Out       Subjective:      Review of Systems   Constitutional: Negative for chills, fatigue and fever.    HENT: Negative for ear pain, postnasal drip and trouble swallowing. Eyes: Negative for pain and visual disturbance. Respiratory: Negative for cough and shortness of breath. Cardiovascular: Negative for chest pain and palpitations. Gastrointestinal: Positive for abdominal pain and nausea. Negative for blood in stool, constipation, diarrhea and vomiting. Genitourinary: Negative for dysuria and urgency. Skin: Negative for rash and wound. Neurological: Negative for dizziness and headaches. Psychiatric/Behavioral: Negative for dysphoric mood. The patient is not nervous/anxious. Objective:     Vitals:    09/28/21 1446   BP: 126/84   Site: Right Upper Arm   Position: Sitting   Cuff Size: Large Adult   Pulse: 88   Resp: 16   Temp: 97.7 °F (36.5 °C)   TempSrc: Temporal   SpO2: 99%   Weight: 203 lb 12.8 oz (92.4 kg)   Height: 4' 11\" (1.499 m)       Body mass index is 41.16 kg/m². Wt Readings from Last 3 Encounters:   09/28/21 203 lb 12.8 oz (92.4 kg)   08/17/21 202 lb 12.8 oz (92 kg)   08/02/21 201 lb (91.2 kg)     BP Readings from Last 3 Encounters:   09/28/21 126/84   08/17/21 (!) 166/95   08/02/21 130/72       Physical Exam  Vitals and nursing note reviewed. Constitutional:       General: She is not in acute distress. Appearance: She is well-developed. She is not diaphoretic. HENT:      Head: Normocephalic and atraumatic. Right Ear: External ear normal.      Left Ear: External ear normal.      Nose: Nose normal.   Eyes:      General: No scleral icterus. Right eye: No discharge. Left eye: No discharge. Conjunctiva/sclera: Conjunctivae normal.   Cardiovascular:      Rate and Rhythm: Normal rate and regular rhythm. Heart sounds: Normal heart sounds. No murmur heard. Pulmonary:      Effort: Pulmonary effort is normal.      Breath sounds: Normal breath sounds. Abdominal:      General: Abdomen is flat. Bowel sounds are normal.      Palpations: Abdomen is soft. There is no mass. Tenderness:  There is generalized abdominal tenderness and tenderness in the epigastric area. Comments: Denies abdominal pain with more of localization to the periumbilical and epigastric region. Musculoskeletal:      Cervical back: Normal range of motion. Skin:     General: Skin is warm and dry. Findings: No erythema or rash. Neurological:      Mental Status: She is alert and oriented to person, place, and time. Cranial Nerves: No cranial nerve deficit. Psychiatric:         Behavior: Behavior normal.         Thought Content: Thought content normal.         Judgment: Judgment normal.         Lab Results   Component Value Date    WBC 3.1 (L) 06/18/2021    HGB 12.2 06/18/2021    HCT 39.9 06/18/2021     06/18/2021    CHOL 221 (H) 10/24/2019    TRIG 68 10/24/2019    HDL 70 10/24/2019    LDLCALC 137 10/24/2019    AST 10 06/18/2021     06/18/2021    K 3.6 06/18/2021     (H) 06/18/2021    CREATININE 0.5 06/18/2021    BUN 8 06/18/2021    CO2 23 06/18/2021    TSH 0.486 10/24/2019    LABGLOM >90 06/18/2021    MG 2.0 11/18/2020    CALCIUM 8.5 06/18/2021    VITD25 26 (L) 11/03/2020       Imaging Results:    No results found. Assessment/Plan:     Amy Rivera was seen today for 3 month follow-up. Diagnoses and all orders for this visit:    Generalized abdominal pain  -     EKG 12 Lead  -     XR ABDOMEN (KUB) (SINGLE AP VIEW); Future  -     ondansetron (ZOFRAN) 4 MG tablet; Take 1 tablet by mouth 3 times daily as needed for Nausea or Vomiting    Endometrial cancer (HCC)    Hx SBO    EKG in the office showing normal sinus rhythm no acute changes. Unlikely to be cardiac in nature at this time. Will get KUB to rule out small bowel obstruction. Zofran for nausea. Patient may be suffering from gastritis as this was seen on her EGD from earlier this month.   Can consider PPI for gastritis if KUB is normal.  Noted history of endometrial cancer with radiation to the pelvis putting patient at higher risk for small bowel obstruction. Strict return precautions given to patient discussed with patient should her symptoms fail to improve or begin to worsen that she should present to the ED for further evaluation and treatment. Patient voiced understanding of the plan and was in agreement. No follow-ups on file. Medications Prescribed:  Orders Placed This Encounter   Medications    ondansetron (ZOFRAN) 4 MG tablet     Sig: Take 1 tablet by mouth 3 times daily as needed for Nausea or Vomiting     Dispense:  15 tablet     Refill:  0       Future Appointments   Date Time Provider Bailey Rocha   10/8/2021 10:45 AM Kendall Canales, APRN - CNP [facility]   11/30/2021 11:30 AM Dafne Dumont, APRN - CNP [facility] [facility]   12/14/2021  3:00 PM Krysta Sanabria DO [email or web address]       Patient given educational materials - see patient instructions. Discussed use, benefit, and sideeffects of prescribed medications. All patient questions answered. Pt voiced understanding. Reviewed health maintenance. Instructed to continue current medications, diet and exercise. Patient agreed with treatment plan. Follow up as directed.      Electronically signed by Krysta Sanabria DO on 9/28/2021 at 3:48 PM

## 2021-09-28 NOTE — PATIENT INSTRUCTIONS
Thank you   1. Thank you for trusting us with your healthcare needs. You may receive a survey regarding today's visit. It would help us out if you would take a few moments to provide your feedback. We value your input. 2. Please bring in ALL medications BOTTLES, including inhalers, herbal supplements, over the counter, prescribed & non-prescribed medicine. The office would like actual medication bottles and a list.   3. Please note our OFFICE POLICIES:   a. Prior to getting your labs drawn, please check with your insurance company for benefits and eligibility of lab services. Often, insurance companies cover certain tests for preventative visits only. It is patient's responsibility to see what is covered. b. We are unable to change a diagnosis after the test has been performed. c. Lab orders will not be re-printed. Please hold onto your original lab orders and take them to your lab to be completed. d. If you no show your scheduled appointment three times, you will be dismissed from this practice. e. Sergio Conch must be completed 24 hours prior to your schedule appointment. 4. If the list below has been completed, PLEASE FAX RECORDS TO OUR OFFICE @ [phone].  Once the records have been received we will update your records at our office:  Health Maintenance Due   Topic Date Due    Pneumococcal 0-64 years Vaccine (1 of 4 - PCV13) Never done    Flu vaccine (1) Never done

## 2021-09-28 NOTE — PROGRESS NOTES
Jerry Castro is a 64 y. o.female    Chief Complaint   Patient presents with    3 Month Follow-Up     Chronic Conditions and also having abdominal pains today causing nausea using Pepto [place] and [place]       Chief complaint, [place], and all pertinent details of the case reviewed with the resident. Please see resident's note for specific details discussed at today's visit. Complains of abdominal pain, epigastric since this morning. Did have 2 normal bms this morning without blood in stool. Feels like an achy pain. Has hx of bowel obstruction and also gastritis. Has had nausea and poor appetitie    Patient Active Problem List   Diagnosis    HTN (hypertension)    Hyperopia of both eyes with astigmatism and presbyopia    Urge incontinence of urine    BMI 40.0-44.9, adult ([facility] [address]    Stress    PMB (postmenopausal bleeding)    Endometrial cancer (HCC)    SBO (small bowel obstruction) (HCC)       Current Outpatient Medications   Medication Sig Dispense Refill    ondansetron (ZOFRAN) 4 MG tablet Take 1 tablet by mouth 3 times daily as needed for Nausea or Vomiting 15 tablet 0    cyclobenzaprine (FLEXERIL) 10 MG tablet Take 1 tablet by mouth 3 times daily as needed for Muscle spasms 30 tablet 1    oxybutynin (DITROPAN) 5 MG tablet Take 1 tablet by mouth 2 times daily 60 tablet 5     No current facility-administered medications for this visit. Review of Systems   Constitutional: Positive for appetite change. Gastrointestinal: Positive for abdominal pain and nausea.  Negative for constipation and diarrhea.    per Dr. May Valladares     /84 (Site: Right Upper Arm, Position: Sitting, Cuff Size: Large Adult)   Pulse 88   Temp 97.7 °F (36.5 °C) (Temporal)   Resp 16   Ht 4' 11\" (1.499 m)   Wt 203 lb 12.8 oz (92.4 kg)   LMP 10/14/2019   SpO2 99%   BMI 41.16 kg/m²   BP Readings from Last 3 Encounters:   09/28/21 126/84   08/17/21 (!) 166/95   08/02/21 130/72 Wt Readings from Last 3 Encounters:   09/28/21 203 lb 12.8 oz (92.4 kg)   08/17/21 202 lb 12.8 oz (92 kg)   08/02/21 201 lb (91.2 kg)     Body mass index is 41.16 kg/m². Physical Exam  Abdominal:      General: Bowel sounds are normal. There is no distension. Tenderness: There is abdominal tenderness (epigastric). There is no guarding or rebound. per Dr. Letitia Pfeiffer    No results found for: LABA1C    Lab Results   Component Value Date    CHOL 221 (H) 10/24/2019    TRIG 68 10/24/2019    HDL 70 10/24/2019    LDLCALC 137 10/24/2019       The 10-year ASCVD risk score (Ramone Maddox., et al., 2013) is: 5.2%    Values used to calculate the score:      Age: 64 years      Sex: Female      Is Non- : Yes      Diabetic: No      Tobacco smoker: No      Systolic Blood Pressure: 493 mmHg      Is BP treated: No      HDL Cholesterol: 70 mg/dL      Total Cholesterol: 221 mg/dL    Lab Results   Component Value Date     06/18/2021    K 3.6 06/18/2021     (H) 06/18/2021    CO2 23 06/18/2021    BUN 8 06/18/2021    CREATININE 0.5 06/18/2021    GLUCOSE 95 06/18/2021    CALCIUM 8.5 06/18/2021    PROT 5.9 (L) 06/18/2021    LABALBU 2.8 (L) 06/18/2021    BILITOT 0.5 06/18/2021    ALKPHOS 79 06/18/2021    AST 10 06/18/2021    ALT <5 (L) 06/18/2021    LABGLOM >90 06/18/2021    GFRAA >60 08/09/2017    GLOB 3.5 06/15/2016       Lab Results   Component Value Date    TSH 0.486 10/24/2019       Lab Results   Component Value Date    WBC 3.1 (L) 06/18/2021    HGB 12.2 06/18/2021    HCT 39.9 06/18/2021    MCV 81.6 06/18/2021     06/18/2021         Future Appointments   Date Time Provider Bailey Rocha   10/8/2021 10:45 AM Concepción Schaffer, APRN - CNP [facility]   11/30/2021 11:30 AM Dafne Bui APRN - CNP [address] [facility]       ASSESSMENT       Diagnosis Orders   1.  Generalized abdominal pain  EKG 12 Lead    XR ABDOMEN (KUB) (SINGLE AP VIEW)    ondansetron (ZOFRAN) 4 MG tablet   2. Endometrial cancer [facility])         PLAN      After discussion with pt and Dr. Oliver May, we agreed on plan as follows:    1.  zofran and KUB. Consider PPI in future if KUB negative. Differential includes partial SBO, gastritis, symptomatic cholelithaisis          Attending Physician Statement  I have discussed the case, including pertinent history and exam findings with the resident. I also have seen the patient and performed key portions of the examination. I agree with the documented assessment and plan as documented by the resident.   GC modifier added to this encounter        Electronically signed by Franck Morelos MD on 9/28/2021 at 3:25 PM

## 2021-09-28 NOTE — PROGRESS NOTES
Health Maintenance Due   Topic Date Due    Pneumococcal 0-64 years Vaccine (1 of 4 - PCV13) Never done    Flu vaccine (1) Never done

## 2021-09-29 ENCOUNTER — TELEPHONE (OUTPATIENT)
Dept: FAMILY MEDICINE CLINIC | Age: 61
End: 2021-09-29

## 2021-09-29 NOTE — TELEPHONE ENCOUNTER
----- Message from Riya Adler DO sent at 9/29/2021 10:19 AM EDT -----  Doesn't look like you have a bowel obstruction at this time. Let us know if your abdominal pain does not get any better and we can talk about trying another medication. Hopefully the zofran helped your nausea.

## 2021-10-01 NOTE — TELEPHONE ENCOUNTER
Attempted to contact pt at [phone]. Received message: Jaime James can not be completed at [employer]. \"

## 2021-10-12 ENCOUNTER — HOSPITAL ENCOUNTER (OUTPATIENT)
Dept: GENERAL RADIOLOGY | Age: 61
Discharge: HOME | End: 2021-10-12
Payer: MEDICARE

## 2021-10-12 DIAGNOSIS — R19.5 POSITIVE FIT (FECAL IMMUNOCHEMICAL TEST): ICD-10-CM

## 2021-10-12 DIAGNOSIS — Z98.890 S/P COLONOSCOPY: ICD-10-CM

## 2021-10-12 DIAGNOSIS — Z98.890 S/P ENDOSCOPY: ICD-10-CM

## 2021-10-12 PROCEDURE — 74250 X-RAY XM SM INT 1CNTRST STD: CPT

## 2021-10-12 PROCEDURE — 2500000003 HC RX 250 WO HCPCS: Performed by: NURSE PRACTITIONER

## 2021-10-12 RX ADMIN — BARIUM SULFATE 600 ML: 240 SUSPENSION ORAL at 08:58

## 2021-12-14 ENCOUNTER — OFFICE VISIT (OUTPATIENT)
Dept: FAMILY MEDICINE CLINIC | Age: 61
End: 2021-12-14
Payer: MEDICARE

## 2021-12-14 VITALS
DIASTOLIC BLOOD PRESSURE: 80 MMHG | BODY MASS INDEX: 42.33 KG/M2 | TEMPERATURE: 98.2 F | RESPIRATION RATE: 14 BRPM | OXYGEN SATURATION: 98 % | SYSTOLIC BLOOD PRESSURE: 134 MMHG | WEIGHT: 210 LBS | HEIGHT: 59 IN | HEART RATE: 94 BPM

## 2021-12-14 DIAGNOSIS — R05.3 CHRONIC COUGH: ICD-10-CM

## 2021-12-14 DIAGNOSIS — R06.02 SHORTNESS OF BREATH: Primary | ICD-10-CM

## 2021-12-14 DIAGNOSIS — R10.84 GENERALIZED ABDOMINAL PAIN: ICD-10-CM

## 2021-12-14 DIAGNOSIS — N39.41 URGE INCONTINENCE OF URINE: ICD-10-CM

## 2021-12-14 PROCEDURE — G8484 FLU IMMUNIZE NO ADMIN: HCPCS | Performed by: STUDENT IN AN ORGANIZED HEALTH CARE EDUCATION/TRAINING PROGRAM

## 2021-12-14 PROCEDURE — 99214 OFFICE O/P EST MOD 30 MIN: CPT | Performed by: STUDENT IN AN ORGANIZED HEALTH CARE EDUCATION/TRAINING PROGRAM

## 2021-12-14 PROCEDURE — G8427 DOCREV CUR MEDS BY ELIG CLIN: HCPCS | Performed by: STUDENT IN AN ORGANIZED HEALTH CARE EDUCATION/TRAINING PROGRAM

## 2021-12-14 PROCEDURE — 1036F TOBACCO NON-USER: CPT | Performed by: STUDENT IN AN ORGANIZED HEALTH CARE EDUCATION/TRAINING PROGRAM

## 2021-12-14 PROCEDURE — G8417 CALC BMI ABV UP PARAM F/U: HCPCS | Performed by: STUDENT IN AN ORGANIZED HEALTH CARE EDUCATION/TRAINING PROGRAM

## 2021-12-14 PROCEDURE — 3017F COLORECTAL CA SCREEN DOC REV: CPT | Performed by: STUDENT IN AN ORGANIZED HEALTH CARE EDUCATION/TRAINING PROGRAM

## 2021-12-14 RX ORDER — ALBUTEROL SULFATE 90 UG/1
2 AEROSOL, METERED RESPIRATORY (INHALATION) EVERY 4 HOURS PRN
Qty: 8 G | Refills: 5 | Status: SHIPPED | OUTPATIENT
Start: 2021-12-14 | End: 2022-10-05 | Stop reason: SDUPTHER

## 2021-12-14 RX ORDER — OXYBUTYNIN CHLORIDE 5 MG/1
5 TABLET ORAL 2 TIMES DAILY
Qty: 60 TABLET | Refills: 5 | Status: SHIPPED | OUTPATIENT
Start: 2021-12-14 | End: 2022-05-10 | Stop reason: DRUGHIGH

## 2021-12-14 NOTE — PROGRESS NOTES
Anushka Dodson is a 64 y. o.female    Chief Complaint   Patient presents with    Follow-up     abdominal pain, referral for glasses,    Discuss Medications     inhaler, incontinence medication     Back Pain     middle back spasms       Chief complaint, [place], and all pertinent details of the case reviewed with the resident. Please see resident's note for specific details discussed at today's visit. Here for follow up of abd pain. Did have blood in stool, followed up with GI. Unremarkable work up. Pain now resolved. Also gets SOA with exertion. Quit smoking 7 years ago. No chest pain. Chronic cough with phelgm production. Also wants refill of ditropan. Patient Active Problem List   Diagnosis    HTN (hypertension)    Hyperopia of both eyes with astigmatism and presbyopia    Urge incontinence of urine    BMI 40.0-44.9, adult ([facility] [address]    Stress    PMB (postmenopausal bleeding)    Endometrial cancer (HCC)    SBO (small bowel obstruction) ([facility])       Current Outpatient Medications   Medication Sig Dispense Refill    oxybutynin (DITROPAN) 5 MG tablet Take 1 tablet by mouth 2 times daily 60 tablet 5    albuterol sulfate HFA (VENTOLIN HFA) 108 (90 Base) MCG/ACT inhaler Inhale 2 puffs into the lungs every 4 hours as needed for Wheezing or Shortness of Breath 8 g 5    ondansetron (ZOFRAN) 4 MG tablet Take 1 tablet by mouth 3 times daily as needed for Nausea or Vomiting 15 tablet 0    cyclobenzaprine (FLEXERIL) 10 MG tablet Take 1 tablet by mouth 3 times daily as needed for Muscle spasms 30 tablet 1     No current facility-administered medications for this visit.        Review of Systems per Dr. Parks Fore    OBJECTIVE     /80 (Site: Left Upper Arm, Position: Sitting, Cuff Size: Medium Adult)   Pulse 94   Temp 98.2 °F (36.8 °C) (Skin)   Resp 14   Ht 4' 11\" (1.499 m)   Wt 210 lb (95.3 kg)   LMP 10/14/2019   SpO2 98%   BMI 42.41 kg/m²   BP Readings from Last 3 Encounters: 12/14/21 134/80   10/08/21 (!) 140/92   09/28/21 126/84       Wt Readings from Last 3 Encounters:   12/14/21 210 lb (95.3 kg)   10/08/21 202 lb (91.6 kg)   09/28/21 203 lb 12.8 oz (92.4 kg)     Body mass index is 42.41 kg/m². Physical Exam per Dr. Skip Ibarra    No results found for: LABA1C    Lab Results   Component Value Date    CHOL 221 (H) 10/24/2019    TRIG 68 10/24/2019    HDL 70 10/24/2019    LDLCALC 137 10/24/2019       The 10-year ASCVD risk score (Esmer Sweet et al., 2013) is: 6.2%    Values used to calculate the score:      Age: 64 years      Sex: Female      Is Non- : Yes      Diabetic: No      Tobacco smoker: No      Systolic Blood Pressure: 904 mmHg      Is BP treated: No      HDL Cholesterol: 70 mg/dL      Total Cholesterol: 221 mg/dL    Lab Results   Component Value Date     06/18/2021    K 3.6 06/18/2021     (H) 06/18/2021    CO2 23 06/18/2021    BUN 8 06/18/2021    CREATININE 0.5 06/18/2021    GLUCOSE 95 06/18/2021    CALCIUM 8.5 06/18/2021    PROT 5.9 (L) 06/18/2021    LABALBU 2.8 (L) 06/18/2021    BILITOT 0.5 06/18/2021    ALKPHOS 79 06/18/2021    AST 10 06/18/2021    ALT <5 (L) 06/18/2021    LABGLOM >90 06/18/2021    GFRAA >60 08/09/2017    GLOB 3.5 06/15/2016       Lab Results   Component Value Date    TSH 0.486 10/24/2019       Lab Results   Component Value Date    WBC 3.1 (L) 06/18/2021    HGB 12.2 06/18/2021    HCT 39.9 06/18/2021    MCV 81.6 06/18/2021     06/18/2021         No future appointments. ASSESSMENT       Diagnosis Orders   1. Shortness of breath  albuterol sulfate HFA (VENTOLIN HFA) 108 (90 Base) MCG/ACT inhaler   2. Urge incontinence of urine  oxybutynin (DITROPAN) 5 MG tablet       PLAN      After discussion with pt and Dr. Skip Ibarra, we agreed on plan as follows:    1. abd pain resolved  2. Albuterol and pfts for SOA to eval possible COPD  3.  Ditropan for urinary symtpoms           Attending Physician Statement  I have discussed the case, including pertinent history and exam findings with the resident. I also have seen the patient and performed key portions of the examination. I agree with the documented assessment and plan as documented by the resident.   GC modifier added to this encounter        Electronically signed by Gilbert Cespedes MD on 12/14/2021 at 4:05 PM

## 2021-12-14 NOTE — PROGRESS NOTES
[address] [address] [facility] [address]  Dept: [phone]  Dept Fax: [phone]  Loc: Windy Zepeda is a 64 y.o. female who presents today for:  Chief Complaint   Patient presents with    Follow-up     abdominal pain, referral for glasses,    Discuss Medications     inhaler, incontinence medication     Back Pain     middle back spasms       Goals      Exercise 3x per week (30 min per time)           HPI:     HPI  Patient presents for follow up for abdominal pain. Followed up with GI for blood in the stool and had EGD/colonoscopy completed, no significant findings. Had capsule endoscopy completed and told the results were normal. Abdominal pain is completely resolved today. She states that she has generally been doing well. Needs refills today on oxybutinin for urge incontinence. Complains of shortness of breath on exertion has had albuterol inhaler for this in the past which improved her symptoms. Also admits to mild chronic cough. The cough is productive with white mucous. Denies chest pain today. Former smoker. Quit 7 years ago. No prior lung workup completed.      Past Medical History:   Diagnosis Date    Anemia     Edema     Endometrial cancer ([facility] [address]     cancer free 7/21/21    Infertility female     Obesity     SBO (small bowel obstruction) (HCC)       Past Surgical History:   Procedure Laterality Date    DILATION AND CURETTAGE OF UTERUS N/A 12/30/2020    DILATATION AND CURETTAGE HYSTEROSCOPY performed by Abdulaziz Edwards MD at [address] [facility] ENDOMETRIAL BIOPSY  02/2021    HYSTERECTOMY, TOTAL ABDOMINAL  03/13/2021    OVARY REMOVAL  2009    1    TN TOTAL ABDOM HYSTERECTOMY  2021     Family History   Problem Relation Age of Onset    Heart Disease Mother     Pancreatic Cancer Father     Stomach Cancer Brother     Cancer Brother Stomach Cancer    Glaucoma Neg Hx     Colon Cancer Neg Hx     Colon Polyps Neg Hx     Esophageal Cancer Neg Hx      Social History     Tobacco Use    Smoking status: Former Smoker     Packs/day: 0.00     Years: 5.00     Pack years: 0.00     Types: Cigars     Quit date: 5/7/2011     Years since quitting: 10.6    Smokeless tobacco: Never Used   Substance Use Topics    Alcohol use: Yes     Alcohol/week: 2.0 standard drinks     Types: 1 Glasses of wine, 1 Cans of beer per week     Comment: occasional      Current Outpatient Medications   Medication Sig Dispense Refill    oxybutynin (DITROPAN) 5 MG tablet Take 1 tablet by mouth 2 times daily 60 tablet 5    albuterol sulfate HFA (VENTOLIN HFA) 108 (90 Base) MCG/ACT inhaler Inhale 2 puffs into the lungs every 4 hours as needed for Wheezing or Shortness of Breath 8 g 5    ondansetron (ZOFRAN) 4 MG tablet Take 1 tablet by mouth 3 times daily as needed for Nausea or Vomiting 15 tablet 0    cyclobenzaprine (FLEXERIL) 10 MG tablet Take 1 tablet by mouth 3 times daily as needed for Muscle spasms 30 tablet 1     No current facility-administered medications for this visit. No Known Allergies    Health Maintenance   Topic Date Due    Pneumococcal 0-64 years Vaccine (1 of 4 - PCV13) Never done    DTaP/Tdap/Td vaccine (1 - Tdap) Never done    Shingles Vaccine (1 of 2) Never done    COVID-19 Vaccine (2 - Booster for [place] series) 05/08/2021    Flu vaccine (1) Never done    Breast cancer screen  07/20/2023    Lipid screen  10/24/2024    Colon cancer screen colonoscopy  08/26/2031    Hepatitis C screen  Completed    HIV screen  Completed    Hepatitis A vaccine  Aged Out    Hepatitis B vaccine  Aged Out    Hib vaccine  Aged Out    Meningococcal (ACWY) vaccine  Aged Out       Subjective:      Review of Systems   Constitutional: Negative for chills, fatigue and fever. HENT: Negative for ear pain, postnasal drip and trouble swallowing.     Eyes: Negative for pain and visual disturbance. Respiratory: Positive for cough and shortness of breath. Cardiovascular: Negative for chest pain and palpitations. Gastrointestinal: Negative for abdominal pain, blood in stool, constipation, diarrhea, nausea and vomiting. Genitourinary: Negative for dysuria and urgency. Skin: Negative for rash and wound. Neurological: Negative for dizziness and headaches. Psychiatric/Behavioral: Negative for dysphoric mood. The patient is not nervous/anxious. Objective:     Vitals:    12/14/21 1521   BP: 134/80   Site: Left Upper Arm   Position: Sitting   Cuff Size: Medium Adult   Pulse: 94   Resp: 14   Temp: 98.2 °F (36.8 °C)   TempSrc: Skin   SpO2: 98%   Weight: 210 lb (95.3 kg)   Height: 4' 11\" (1.499 m)       Body mass index is 42.41 kg/m². Wt Readings from Last 3 Encounters:   12/14/21 210 lb (95.3 kg)   10/08/21 202 lb (91.6 kg)   09/28/21 203 lb 12.8 oz (92.4 kg)     BP Readings from Last 3 Encounters:   12/14/21 134/80   10/08/21 (!) 140/92   09/28/21 126/84       Physical Exam  Vitals and nursing note reviewed. Constitutional:       General: She is not in acute distress. Appearance: She is well-developed. She is not diaphoretic. HENT:      Head: Normocephalic and atraumatic. Right Ear: External ear normal.      Left Ear: External ear normal.      Nose: Nose normal.   Eyes:      General: No scleral icterus. Right eye: No discharge. Left eye: No discharge. Conjunctiva/sclera: Conjunctivae normal.   Cardiovascular:      Rate and Rhythm: Normal rate and regular rhythm. Heart sounds: Normal heart sounds. No murmur heard. Pulmonary:      Effort: Pulmonary effort is normal.      Breath sounds: Normal breath sounds. Abdominal:      General: Abdomen is flat. Palpations: Abdomen is soft. Musculoskeletal:      Cervical back: Normal range of motion. Skin:     General: Skin is warm and dry. Findings: No erythema or rash. Neurological:      Mental Status: She is alert and oriented to person, place, and time. Cranial Nerves: No cranial nerve deficit. Psychiatric:         Behavior: Behavior normal.         Thought Content: Thought content normal.         Judgment: Judgment normal.         Lab Results   Component Value Date    WBC 3.1 (L) 06/18/2021    HGB 12.2 06/18/2021    HCT 39.9 06/18/2021     06/18/2021    CHOL 221 (H) 10/24/2019    TRIG 68 10/24/2019    HDL 70 10/24/2019    LDLCALC 137 10/24/2019    AST 10 06/18/2021     06/18/2021    K 3.6 06/18/2021     (H) 06/18/2021    CREATININE 0.5 06/18/2021    BUN 8 06/18/2021    CO2 23 06/18/2021    TSH 0.486 10/24/2019    LABGLOM >90 06/18/2021    MG 2.0 11/18/2020    CALCIUM 8.5 06/18/2021    VITD25 26 (L) 11/03/2020       Imaging Results:    No results found. Assessment/Plan:     Corrinne Client was seen today for follow-up, discuss medications and back pain. Diagnoses and all orders for this visit:    Shortness of breath  -     albuterol sulfate HFA (VENTOLIN HFA) 108 (90 Base) MCG/ACT inhaler; Inhale 2 puffs into the lungs every 4 hours as needed for Wheezing or Shortness of Breath  -     Full PFT Study With Bronchodilator; Future    Chronic cough  -     Full PFT Study With Bronchodilator; Future    Urge incontinence of urine  -     oxybutynin (DITROPAN) 5 MG tablet; Take 1 tablet by mouth 2 times daily    Generalized abdominal pain      Abdominal pain resolved. Will reorder oxybutinin. Refill albuterol for shortness of breath. Send for full PFTs for further evaluation. No follow-ups on file.       Medications Prescribed:  Orders Placed This Encounter   Medications    oxybutynin (DITROPAN) 5 MG tablet     Sig: Take 1 tablet by mouth 2 times daily     Dispense:  60 tablet     Refill:  5    albuterol sulfate HFA (VENTOLIN HFA) 108 (90 Base) MCG/ACT inhaler     Sig: Inhale 2 puffs into the lungs every 4 hours as needed for Wheezing or Shortness of Breath     Dispense:  8 g     Refill:  5       Future Appointments   Date Time Provider Bailey Josefina   5/10/2022  8:00 AM Mitch George DO [facility]       Patient given educational materials - see patient instructions. Discussed use, benefit, and sideeffects of prescribed medications. All patient questions answered. Pt voiced understanding. Reviewed health maintenance. Instructed to continue current medications, diet and exercise. Patient agreed with treatment plan. Follow up as directed.      Electronically signed by Kyle Kurtz DO on 12/30/2021 at 10:09 AM

## 2021-12-14 NOTE — PROGRESS NOTES
Health Maintenance Due   Topic Date Due    Pneumococcal 0-64 years Vaccine (1 of 4 - PCV13) Never done    DTaP/Tdap/Td vaccine (1 - Tdap) Never done    Shingles Vaccine (1 of 2) Never done    COVID-19 Vaccine (2 - Booster for [employer] series) 05/08/2021    Flu vaccine (1) Never done

## 2021-12-30 ASSESSMENT — ENCOUNTER SYMPTOMS
TROUBLE SWALLOWING: 0
BLOOD IN STOOL: 0
DIARRHEA: 0
CONSTIPATION: 0
NAUSEA: 0
ABDOMINAL PAIN: 0
EYE PAIN: 0
COUGH: 1
SHORTNESS OF BREATH: 1
VOMITING: 0

## 2022-03-30 NOTE — PROGRESS NOTES
[facility] [address] [facility], [address]  Ph. [phone]  Fax [phone]    PATIENT: Nimo Granger  : 1960  MRN: [id number]  DATE: 2021      DIAGNOSIS: C54.1 -- Malignant neoplasm of the endometrium; Adenocarcinoma, endometroid type, FIGO Grade 2; pT2 N0 M0, Stage II  [Invades outer half of myometrium; +cervical stromal involvement; nodes negative; margins clear]  Date of diagnosis: 2020      Treatment Course Number: 1    Treatment Site (s) Modality Dose (cGy) From To Fractions/  Elapsed Days   Upper Vagina, Parametria, Pelvic Nodes 15MV photons 4500 cGy 2021   Vaginal Cuff Boost  Ir192 1500 cGy 2021 2021 3/ 11         HISTORY OF PRESENT ILLNESS:   Jeimy Gil was initially seen as a 43-year-old woman who presented with vaginal bleeding. The bleeding was concerning, due to the patient age, and she also was found on PAP to have KARI. Cervix biopsy with fractional D&C was completed 2020. There were focal atypical glands seen in the endocervical curettage, and the endometrial biopsy showed extensive stromal breakdown with glandular crowding and squamous morules, additional evaluation was recommended, and endometrial biopsy was performed 2020. The biopsy confirmed the presence of endometrial adenocarcinoma. Fauzia Torres underwent gynecologic oncology evaluation. In accordance with clinical practice guidelines, she received a recommendation for definitive surgery. On 3/26/2021, she underwent surgical staging. Final pathology showed FIGO grade 2 endometrioid adenocarcinoma invading through 57% of the myometrial thickness. Cervical stromal involvement was present, but there was no mention of any lymphatic/vascular space involvement. The closest surgical margin was at the vaginal cuff, measuring 5 mm.   During her gynecologic oncology generated transcription errors may be present.

## 2022-05-10 ENCOUNTER — OFFICE VISIT (OUTPATIENT)
Dept: FAMILY MEDICINE CLINIC | Age: 62
End: 2022-05-10
Payer: MEDICARE

## 2022-05-10 VITALS
TEMPERATURE: 98.8 F | SYSTOLIC BLOOD PRESSURE: 124 MMHG | WEIGHT: 221.8 LBS | RESPIRATION RATE: 16 BRPM | HEIGHT: 59 IN | HEART RATE: 83 BPM | OXYGEN SATURATION: 99 % | DIASTOLIC BLOOD PRESSURE: 70 MMHG | BODY MASS INDEX: 44.72 KG/M2

## 2022-05-10 DIAGNOSIS — R06.02 SHORTNESS OF BREATH: ICD-10-CM

## 2022-05-10 DIAGNOSIS — N39.41 URGE INCONTINENCE OF URINE: ICD-10-CM

## 2022-05-10 DIAGNOSIS — C54.1 ENDOMETRIAL CANCER (HCC): ICD-10-CM

## 2022-05-10 DIAGNOSIS — M62.830 MUSCLE SPASM OF BACK: ICD-10-CM

## 2022-05-10 PROCEDURE — G8427 DOCREV CUR MEDS BY ELIG CLIN: HCPCS | Performed by: STUDENT IN AN ORGANIZED HEALTH CARE EDUCATION/TRAINING PROGRAM

## 2022-05-10 PROCEDURE — 99214 OFFICE O/P EST MOD 30 MIN: CPT | Performed by: STUDENT IN AN ORGANIZED HEALTH CARE EDUCATION/TRAINING PROGRAM

## 2022-05-10 PROCEDURE — 3017F COLORECTAL CA SCREEN DOC REV: CPT | Performed by: STUDENT IN AN ORGANIZED HEALTH CARE EDUCATION/TRAINING PROGRAM

## 2022-05-10 PROCEDURE — G8417 CALC BMI ABV UP PARAM F/U: HCPCS | Performed by: STUDENT IN AN ORGANIZED HEALTH CARE EDUCATION/TRAINING PROGRAM

## 2022-05-10 PROCEDURE — 1036F TOBACCO NON-USER: CPT | Performed by: STUDENT IN AN ORGANIZED HEALTH CARE EDUCATION/TRAINING PROGRAM

## 2022-05-10 RX ORDER — FLUTICASONE PROPIONATE 50 MCG
2 SPRAY, SUSPENSION (ML) NASAL DAILY
Qty: 16 G | Refills: 0 | Status: SHIPPED | OUTPATIENT
Start: 2022-05-10 | End: 2022-10-05 | Stop reason: SDUPTHER

## 2022-05-10 RX ORDER — OXYBUTYNIN CHLORIDE 10 MG/1
10 TABLET, EXTENDED RELEASE ORAL DAILY
Qty: 30 TABLET | Refills: 3 | Status: SHIPPED | OUTPATIENT
Start: 2022-05-10 | End: 2022-10-05 | Stop reason: SDUPTHER

## 2022-05-10 RX ORDER — OXYBUTYNIN CHLORIDE 5 MG/1
5 TABLET ORAL 2 TIMES DAILY
Qty: 60 TABLET | Refills: 5 | Status: CANCELLED | OUTPATIENT
Start: 2022-05-10

## 2022-05-10 RX ORDER — CYCLOBENZAPRINE HCL 10 MG
10 TABLET ORAL 3 TIMES DAILY PRN
Qty: 30 TABLET | Refills: 1 | Status: SHIPPED | OUTPATIENT
Start: 2022-05-10 | End: 2022-06-23 | Stop reason: SDUPTHER

## 2022-05-10 RX ORDER — ALBUTEROL SULFATE 90 UG/1
2 AEROSOL, METERED RESPIRATORY (INHALATION) EVERY 4 HOURS PRN
Qty: 8 G | Refills: 5 | Status: CANCELLED | OUTPATIENT
Start: 2022-05-10

## 2022-05-10 ASSESSMENT — ENCOUNTER SYMPTOMS
SHORTNESS OF BREATH: 1
COUGH: 0
EYE PAIN: 0
VOMITING: 0
NAUSEA: 0
DIARRHEA: 0
BLOOD IN STOOL: 0
ABDOMINAL PAIN: 0
TROUBLE SWALLOWING: 0
CONSTIPATION: 0

## 2022-05-10 NOTE — PROGRESS NOTES
S: 64 y.o. female with   Chief Complaint   Patient presents with    Follow-up     4 Month Follow-up discuss incontinence shorts       Chief complaint, [place], and all pertinent details of the case reviewed with the resident. Please see resident's note for specific details discussed at today's visit. BP Readings from Last 3 Encounters:   05/10/22 124/70   12/14/21 134/80   10/08/21 (!) 140/92     Wt Readings from Last 3 Encounters:   05/10/22 221 lb 12.8 oz (100.6 kg)   12/14/21 210 lb (95.3 kg)   10/08/21 202 lb (91.6 kg)       O: VS:  height is 4' 11\" (1.499 m) and weight is 221 lb 12.8 oz (100.6 kg). Her temporal temperature is 98.8 °F (37.1 °C). Her blood pressure is 124/70 and her pulse is 83. Her respiration is 16 and oxygen saturation is 99%. AAO/NAD, appropriate affect for mood, does not appear short of breath currently       Diagnosis Orders   1. Endometrial cancer ([facility] [address]     2. Muscle spasm of back     3. Shortness of breath     4. Urge incontinence of urine         Plan:  Encourage pt to f/u with oncology to confirm future plan for f/u of her endometrial CA  Get previously ordered PFT's to evaluate SOB  Flonase for nasal congestion  Refill flexeril for occasional muscle spasms  Switch oxybtynin to 10 mg ER    Health Maintenance Due   Topic Date Due    Pneumococcal 0-64 years Vaccine (1 - PCV) Never done    DTaP/Tdap/Td vaccine (1 - Tdap) Never done    Shingles vaccine (1 of 2) Never done    COVID-19 Vaccine (2 - Marcy risk 3-dose series) 04/10/2021       Attending Physician Statement  I have discussed the case, including pertinent history and exam findings with the resident. I also have seen the patient and performed key portions of the examination. I agree with the documented assessment and plan as documented by the resident.         Brian Mejia MD 5/10/2022 8:31 AM

## 2022-05-10 NOTE — PROGRESS NOTES
[address] [facility]. [address]  [facility] [address]  Dept: [phone]  Loc: [address] (:  1960) is a 64 y.o. female here for evaluation of the following chief complaint(s):  Follow-up (4 Month Follow-up discuss incontinence shorts)      JR precepting note    ASSESSMENT/PLAN:  1. Endometrial cancer ([address]  2. Muscle spasm of back  The following orders have not been finalized:  -     cyclobenzaprine (FLEXERIL) 10 MG tablet  3. Shortness of breath  4. Urge incontinence of urine    Follow up with gyn-oncology as she appears to have missed some appointments. Change to 10 mg ER dosing of oxybutynin. Get PFTs done, continue albuterol use PRN. Refill of flexeril. SUBJECTIVE/OBJECTIVE:  HPI    Hx of endometrial cancer, has no-showed oncology a few times. Has had radiation therapy in the past.  C/o urinary incontinence today; is on oxybutynin. No SE from 5 mg dosing. SOB, former smoker. Normal EGD, c-scope, and pill endoscopy after positive FIT test; all were normal.     Review of Systems per Dr. Marguerite Hutton note    Physical Exam per Dr. Marguerite Hutton note      Vitals:    05/10/22 0757   BP: 124/70   Site: Left Upper Arm   Position: Sitting   Cuff Size: Large Adult   Pulse: 83   Resp: 16   Temp: 98.8 °F (37.1 °C)   TempSrc: Temporal   SpO2: 99%   Weight: 221 lb 12.8 oz (100.6 kg)   Height: 4' 11\" (1.499 m)         An electronic signature was used to authenticate this note.     --Esther Thomas MD

## 2022-05-10 NOTE — PATIENT INSTRUCTIONS
Thank you   1. Thank you for trusting us with your healthcare needs. You may receive a survey regarding today's visit. It would help us out if you would take a few moments to provide your feedback. We value your input. 2. Please bring in ALL medications BOTTLES, including inhalers, herbal supplements, over the counter, prescribed & non-prescribed medicine. The office would like actual medication bottles and a list.   3. Please note our OFFICE POLICIES:   a. Prior to getting your labs drawn, please check with your insurance company for benefits and eligibility of lab services. Often, insurance companies cover certain tests for preventative visits only. It is patient's responsibility to see what is covered. b. We are unable to change a diagnosis after the test has been performed. c. Lab orders will not be re-printed. Please hold onto your original lab orders and take them to your lab to be completed. d. If you no show your scheduled appointment three times, you will be dismissed from this practice. e. [place] Must must be completed 24 hours prior to your schedule appointment. 4. If the list below has been completed, PLEASE FAX RECORDS TO OUR OFFICE @ [phone].  Once the records have been received we will update your records at our office:  Health Maintenance Due   Topic Date Due    Pneumococcal 0-64 years Vaccine (1 - PCV) Never done    DTaP/Tdap/Td vaccine (1 - Tdap) Never done    Shingles vaccine (1 of 2) Never done    COVID-19 Vaccine (2 - Marcy risk 3-dose series) 04/10/2021

## 2022-05-10 NOTE — PROGRESS NOTES
Health Maintenance Due   Topic Date Due    Pneumococcal 0-64 years Vaccine (1 - PCV) Never done    DTaP/Tdap/Td vaccine (1 - Tdap) Never done    Shingles vaccine (1 of 2) Never done    COVID-19 Vaccine (2 - Marcy risk 3-dose series) 04/10/2021

## 2022-05-10 NOTE — PROGRESS NOTES
[address] [facility] [address] [facility] [address]  Dept: [phone]  Dept Fax: [phone]  Loc: Kendy Gifford is a 64 y.o. female who presents today for:  Chief Complaint   Patient presents with    Follow-up     4 Month Follow-up discuss incontinence shorts       Goals      Exercise 3x per week (30 min per time)           HPI:     HPI   Here for f/u. Has been doing okay. Has gained around 10lbs over the last 6 months. States that she has been snacking more recently. Wants to try to cute back on the snacking and exercise more now that it is warmer outside. SOB- has been going on for several months. Has tried albuterol inhaler with minimal relief. Had PFTS ordered but was unable to get them completed. No chest pain or worsening symptoms with exertion. Complaining of congestion at night. Chronic incontinence- mostly in the middle of the night. Feels like she can fully empty bladder without issue when using the restroom. Also feels some urge incontinence. Has been taking oxybutinin which seems to help sometimes. Muscle spasms: using flexeril for muscle spasms. Significant benefit from this medication. Endometrial cancer- Had previously followed with Gyn/Onc and Rad/Onc. Missed her last appt with Gyn/Onc. Encouraged to set up f/u with them for further evaluation. Had positive FIT test- seen by Dr. Rachel Silverman and had normal EGD, Colonoscopy and Capsule endoscopy.      Past Medical History:   Diagnosis Date    Anemia     Edema     Endometrial cancer ([address]     cancer free 7/21/21    Infertility female     Obesity     SBO (small bowel obstruction) ([facility] [address]       Past Surgical History:   Procedure Laterality Date    DILATION AND CURETTAGE OF UTERUS N/A 12/30/2020    DILATATION AND CURETTAGE HYSTEROSCOPY performed by Milli Vides MD at [address] [place] [address]      ENDOMETRIAL BIOPSY  2021    HYSTERECTOMY, TOTAL ABDOMINAL  2021    OVARY REMOVAL  2009    AK TOTAL ABDOM HYSTERECTOMY       Family History   Problem Relation Age of Onset    Heart Disease Mother     Pancreatic Cancer Father     Stomach Cancer Brother     Cancer Brother         Stomach Cancer    Glaucoma Neg Hx     Colon Cancer Neg Hx     Colon Polyps Neg Hx     Esophageal Cancer Neg Hx      Social History     Tobacco Use    Smoking status: Former Smoker     Packs/day: 0.00     Years: 5.00     Pack years: 0.00     Types: Cigars     Quit date: 2011     Years since quittin.0    Smokeless tobacco: Never Used   Substance Use Topics    Alcohol use: Yes     Alcohol/week: 2.0 standard drinks     Types: 1 Glasses of wine, 1 Cans of beer per week     Comment: occasional      Current Outpatient Medications   Medication Sig Dispense Refill    cyclobenzaprine (FLEXERIL) 10 MG tablet Take 1 tablet by mouth 3 times daily as needed for Muscle spasms 30 tablet 1    oxybutynin (DITROPAN XL) 10 MG extended release tablet Take 1 tablet by mouth daily 30 tablet 3    fluticasone (FLONASE) 50 MCG/ACT nasal spray 2 sprays by Each Nostril route daily 16 g 0    albuterol sulfate HFA (VENTOLIN HFA) 108 (90 Base) MCG/ACT inhaler Inhale 2 puffs into the lungs every 4 hours as needed for Wheezing or Shortness of Breath 8 g 5     No current facility-administered medications for this visit.      No Known Allergies    Health Maintenance   Topic Date Due    Pneumococcal 0-64 years Vaccine (1 - PCV) Never done    DTaP/Tdap/Td vaccine (1 - Tdap) Never done    Shingles vaccine (1 of 2) Never done    COVID-19 Vaccine (2 - Marcy risk 3-dose series) 04/10/2021    Depression Screen  2022    Flu vaccine (Season Ended) 2022    Breast cancer screen  2023    Lipids  10/24/2024    Colorectal Cancer Screen  2031    Hepatitis C screen  Completed    HIV screen  Completed    Hepatitis A vaccine Aged Out    Hepatitis B vaccine  Aged Out    Hib vaccine  Aged Out    Meningococcal (ACWY) vaccine  Aged Out       Subjective:      Review of Systems   Constitutional: Negative for chills, fatigue and fever. HENT: Positive for congestion. Negative for ear pain, postnasal drip and trouble swallowing. Eyes: Negative for pain and visual disturbance. Respiratory: Positive for shortness of breath. Negative for cough. Cardiovascular: Negative for chest pain and palpitations. Gastrointestinal: Negative for abdominal pain, blood in stool, constipation, diarrhea, nausea and vomiting. Genitourinary: Negative for dysuria and urgency. Skin: Negative for rash and wound. Neurological: Negative for dizziness and headaches. Psychiatric/Behavioral: Negative for dysphoric mood. The patient is not nervous/anxious. Objective:     Vitals:    05/10/22 0757   BP: 124/70   Site: Left Upper Arm   Position: Sitting   Cuff Size: Large Adult   Pulse: 83   Resp: 16   Temp: 98.8 °F (37.1 °C)   TempSrc: Temporal   SpO2: 99%   Weight: 221 lb 12.8 oz (100.6 kg)   Height: 4' 11\" (1.499 m)       Body mass index is 44.8 kg/m². Wt Readings from Last 3 Encounters:   05/10/22 221 lb 12.8 oz (100.6 kg)   12/14/21 210 lb (95.3 kg)   10/08/21 202 lb (91.6 kg)     BP Readings from Last 3 Encounters:   05/10/22 124/70   12/14/21 134/80   10/08/21 (!) 140/92       Physical Exam  Vitals and nursing note reviewed. Constitutional:       General: She is not in acute distress. Appearance: She is well-developed. She is not diaphoretic. HENT:      Head: Normocephalic and atraumatic. Right Ear: External ear normal.      Left Ear: External ear normal.      Nose: Nose normal.   Eyes:      General: No scleral icterus. Right eye: No discharge. Left eye: No discharge. Conjunctiva/sclera: Conjunctivae normal.   Cardiovascular:      Rate and Rhythm: Normal rate and regular rhythm.       Heart sounds: Normal heart sounds. No murmur heard. Pulmonary:      Effort: Pulmonary effort is normal.      Breath sounds: Normal breath sounds. Musculoskeletal:      Cervical back: Normal range of motion. Skin:     General: Skin is warm and dry. Findings: No erythema or rash. Neurological:      Mental Status: She is alert and oriented to person, place, and time. Cranial Nerves: No cranial nerve deficit. Psychiatric:         Behavior: Behavior normal.         Thought Content: Thought content normal.         Judgment: Judgment normal.         Lab Results   Component Value Date    WBC 3.1 (L) 06/18/2021    HGB 12.2 06/18/2021    HCT 39.9 06/18/2021     06/18/2021    CHOL 221 (H) 10/24/2019    TRIG 68 10/24/2019    HDL 70 10/24/2019    LDLCALC 137 10/24/2019    AST 10 06/18/2021     06/18/2021    K 3.6 06/18/2021     (H) 06/18/2021    CREATININE 0.5 06/18/2021    BUN 8 06/18/2021    CO2 23 06/18/2021    TSH 0.486 10/24/2019    LABGLOM >90 06/18/2021    MG 2.0 11/18/2020    CALCIUM 8.5 06/18/2021    VITD25 26 (L) 11/03/2020       Imaging Results:    No results found. Assessment/Plan:     Juhi Argueta was seen today for follow-up. Diagnoses and all orders for this visit:    Endometrial cancer ([facility] [address] [facility]    Muscle spasm of back  -     cyclobenzaprine (FLEXERIL) 10 MG tablet; Take 1 tablet by mouth 3 times daily as needed for Muscle spasms    Shortness of breath  -     Full PFT Study With Bronchodilator; Future  -     fluticasone (FLONASE) 50 MCG/ACT nasal spray; 2 sprays by Each Nostril route daily    Urge incontinence of urine  -     oxybutynin (DITROPAN XL) 10 MG extended release tablet; Take 1 tablet by mouth daily      F/u with gyn/onc for hx of endometrial cancer  Will increase oxybutinin to 10mg ER for mixed incontinence. I did offer a referral to pelvic floor PT today however patient would like to try medication first.   PFT order placed again today for further evaluation of SOB.  She is a former smoker. Will also try some flonase for her congestion. Refill flexeril today. F/u in 1 month. Return in about 4 weeks (around 2022). Medications Prescribed:  Orders Placed This Encounter   Medications    cyclobenzaprine (FLEXERIL) 10 MG tablet     Sig: Take 1 tablet by mouth 3 times daily as needed for Muscle spasms     Dispense:  30 tablet     Refill:  1    oxybutynin (DITROPAN XL) 10 MG extended release tablet     Sig: Take 1 tablet by mouth daily     Dispense:  30 tablet     Refill:  3    fluticasone (FLONASE) 50 MCG/ACT nasal spray     Si sprays by Each Nostril route daily     Dispense:  16 g     Refill:  0       Future Appointments   Date Time Provider Bailey Rocha   2022  3:20 PM Mitch Geogre DO [facility] [address] [facility]       Patient given educational materials - see patient instructions. Discussed use, benefit, and sideeffects of prescribed medications. All patient questions answered. Pt voiced understanding. Reviewed health maintenance. Instructed to continue current medications, diet and exercise. Patient agreed with treatment plan. Follow up as directed.      Electronically signed by New Cuello DO on 5/10/2022 at 8:46 AM

## 2022-06-08 ENCOUNTER — TELEPHONE (OUTPATIENT)
Dept: FAMILY MEDICINE CLINIC | Age: 62
End: 2022-06-08

## 2022-06-08 NOTE — TELEPHONE ENCOUNTER
Marcelino Ceballos no show their appointment on 06/07/2022. Appointment was not confirmed by Marcelino Ceballos. Staff member, [facility] called the patient 06/06/2022, left a voicemail to confirm appointment. Staff member, [facility] called the patient 06/07/2022,  left a voicemail to help patient to reschedule their no show. No show letter has been made, sent, printed and mailed to patient.

## 2022-06-08 NOTE — LETTER
[address] [place] [address],[address] [facility] [address] [place] [address]  Phone: [phone]  Fax: [phone] [facility],         June 8, 2022     Candy Duran  [facility]  [address]  [place] [address]      Dear Tracy James: We missed seeing you for a scheduled appointment at [place] [address] with Gagan Arango DO on 6/8/2022. We're sorry you were unable to keep your appointment and hope that you are doing well. We ask that you please call 24 hours in advance if you are unable to make your appointment, so that we can give that time to another patient in need. We care about you and the management of your healthcare and want to make sure that you follow up as recommended. To provide quality care and timely appointments to all our patients, you may be dismissed from the practice if you do not show for three (3) scheduled appointments within a 12-month period. We would like to continue treating your healthcare needs. Please call the office to reschedule your appointment, if needed.      Sincerely,        Patti George DO

## 2022-06-23 ENCOUNTER — OFFICE VISIT (OUTPATIENT)
Dept: FAMILY MEDICINE CLINIC | Age: 62
End: 2022-06-23
Payer: MEDICARE

## 2022-06-23 ENCOUNTER — HOSPITAL ENCOUNTER (OUTPATIENT)
Age: 62
Discharge: HOME | End: 2022-06-23
Payer: MEDICARE

## 2022-06-23 VITALS
TEMPERATURE: 97.8 F | RESPIRATION RATE: 16 BRPM | OXYGEN SATURATION: 96 % | HEIGHT: 59 IN | WEIGHT: 230.6 LBS | BODY MASS INDEX: 46.49 KG/M2 | SYSTOLIC BLOOD PRESSURE: 120 MMHG | DIASTOLIC BLOOD PRESSURE: 82 MMHG | HEART RATE: 71 BPM

## 2022-06-23 DIAGNOSIS — E66.01 CLASS 3 SEVERE OBESITY WITHOUT SERIOUS COMORBIDITY IN ADULT, UNSPECIFIED BMI, UNSPECIFIED OBESITY TYPE (HCC): ICD-10-CM

## 2022-06-23 DIAGNOSIS — M54.50 CHRONIC BILATERAL LOW BACK PAIN, UNSPECIFIED WHETHER SCIATICA PRESENT: ICD-10-CM

## 2022-06-23 DIAGNOSIS — M62.830 MUSCLE SPASM OF BACK: ICD-10-CM

## 2022-06-23 DIAGNOSIS — G89.29 CHRONIC BILATERAL LOW BACK PAIN, UNSPECIFIED WHETHER SCIATICA PRESENT: ICD-10-CM

## 2022-06-23 DIAGNOSIS — Z00.00 WELLNESS EXAMINATION: ICD-10-CM

## 2022-06-23 DIAGNOSIS — Z00.00 WELLNESS EXAMINATION: Primary | ICD-10-CM

## 2022-06-23 LAB
ALBUMIN SERPL-MCNC: 4.2 G/DL (ref 3.5–5.1)
ALP BLD-CCNC: 124 U/L (ref 38–126)
ALT SERPL-CCNC: 10 U/L (ref 11–66)
ANION GAP SERPL CALCULATED.3IONS-SCNC: 6 MEQ/L (ref 8–16)
AST SERPL-CCNC: 18 U/L (ref 5–40)
BASOPHILS # BLD: 0.2 %
BASOPHILS ABSOLUTE: 0 THOU/MM3 (ref 0–0.1)
BILIRUB SERPL-MCNC: 0.4 MG/DL (ref 0.3–1.2)
BUN BLDV-MCNC: 11 MG/DL (ref 7–22)
CALCIUM SERPL-MCNC: 9.4 MG/DL (ref 8.5–10.5)
CHLORIDE BLD-SCNC: 104 MEQ/L (ref 98–111)
CHOLESTEROL, TOTAL: 208 MG/DL (ref 100–199)
CO2: 29 MEQ/L (ref 23–33)
CREAT SERPL-MCNC: 0.6 MG/DL (ref 0.4–1.2)
EOSINOPHIL # BLD: 1.4 %
EOSINOPHILS ABSOLUTE: 0.1 THOU/MM3 (ref 0–0.4)
ERYTHROCYTE [DISTWIDTH] IN BLOOD BY AUTOMATED COUNT: 15.3 % (ref 11.5–14.5)
ERYTHROCYTE [DISTWIDTH] IN BLOOD BY AUTOMATED COUNT: 46.2 FL (ref 35–45)
GFR SERPL CREATININE-BSD FRML MDRD: > 90 ML/MIN/1.73M2
GLUCOSE BLD-MCNC: 90 MG/DL (ref 70–108)
HCT VFR BLD CALC: 44 % (ref 37–47)
HDLC SERPL-MCNC: 81 MG/DL
HEMOGLOBIN: 13.8 GM/DL (ref 12–16)
IMMATURE GRANS (ABS): 0.02 THOU/MM3 (ref 0–0.07)
IMMATURE GRANULOCYTES: 0.5 %
LDL CHOLESTEROL CALCULATED: 112 MG/DL
LYMPHOCYTES # BLD: 27.7 %
LYMPHOCYTES ABSOLUTE: 1.2 THOU/MM3 (ref 1–4.8)
MCH RBC QN AUTO: 26.3 PG (ref 26–33)
MCHC RBC AUTO-ENTMCNC: 31.4 GM/DL (ref 32.2–35.5)
MCV RBC AUTO: 83.8 FL (ref 81–99)
MONOCYTES # BLD: 7.6 %
MONOCYTES ABSOLUTE: 0.3 THOU/MM3 (ref 0.4–1.3)
NUCLEATED RED BLOOD CELLS: 0 /100 WBC
PLATELET # BLD: 233 THOU/MM3 (ref 130–400)
PMV BLD AUTO: 9.9 FL (ref 9.4–12.4)
POTASSIUM SERPL-SCNC: 3.8 MEQ/L (ref 3.5–5.2)
RBC # BLD: 5.25 MILL/MM3 (ref 4.2–5.4)
SEG NEUTROPHILS: 62.6 %
SEGMENTED NEUTROPHILS ABSOLUTE COUNT: 2.8 THOU/MM3 (ref 1.8–7.7)
SODIUM BLD-SCNC: 139 MEQ/L (ref 135–145)
T4 FREE: 0.98 NG/DL (ref 0.93–1.76)
TOTAL PROTEIN: 7.2 G/DL (ref 6.1–8)
TRIGL SERPL-MCNC: 74 MG/DL (ref 0–199)
TSH SERPL DL<=0.05 MIU/L-ACNC: 1.16 UIU/ML (ref 0.4–4.2)
WBC # BLD: 4.4 THOU/MM3 (ref 4.8–10.8)

## 2022-06-23 PROCEDURE — 85025 COMPLETE CBC W/AUTO DIFF WBC: CPT

## 2022-06-23 PROCEDURE — 84443 ASSAY THYROID STIM HORMONE: CPT

## 2022-06-23 PROCEDURE — 80061 LIPID PANEL: CPT

## 2022-06-23 PROCEDURE — 84439 ASSAY OF FREE THYROXINE: CPT

## 2022-06-23 PROCEDURE — 99396 PREV VISIT EST AGE 40-64: CPT | Performed by: STUDENT IN AN ORGANIZED HEALTH CARE EDUCATION/TRAINING PROGRAM

## 2022-06-23 PROCEDURE — 36415 COLL VENOUS BLD VENIPUNCTURE: CPT

## 2022-06-23 PROCEDURE — 80053 COMPREHEN METABOLIC PANEL: CPT

## 2022-06-23 RX ORDER — CYCLOBENZAPRINE HCL 10 MG
10 TABLET ORAL 3 TIMES DAILY PRN
Qty: 30 TABLET | Refills: 1 | Status: SHIPPED | OUTPATIENT
Start: 2022-06-23 | End: 2022-10-05 | Stop reason: SDUPTHER

## 2022-06-23 ASSESSMENT — ENCOUNTER SYMPTOMS
SHORTNESS OF BREATH: 0
VOMITING: 0
ABDOMINAL PAIN: 0
TROUBLE SWALLOWING: 0
EYE PAIN: 0
CONSTIPATION: 0
COUGH: 0
DIARRHEA: 0
NAUSEA: 0
BLOOD IN STOOL: 0

## 2022-06-23 ASSESSMENT — PATIENT HEALTH QUESTIONNAIRE - PHQ9
1. LITTLE INTEREST OR PLEASURE IN DOING THINGS: 0
2. FEELING DOWN, DEPRESSED OR HOPELESS: 0
SUM OF ALL RESPONSES TO PHQ QUESTIONS 1-9: 0
SUM OF ALL RESPONSES TO PHQ9 QUESTIONS 1 & 2: 0

## 2022-06-23 NOTE — PROGRESS NOTES
S: 64 y.o. female with   Chief Complaint   Patient presents with    Follow-up       HPI: please see resident note for HPI and ROS. BP Readings from Last 3 Encounters:   06/23/22 120/82   05/10/22 124/70   12/14/21 134/80     Wt Readings from Last 3 Encounters:   06/23/22 230 lb 9.6 oz (104.6 kg)   05/10/22 221 lb 12.8 oz (100.6 kg)   12/14/21 210 lb (95.3 kg)       O: VS:  height is 4' 11\" (1.499 m) and weight is 230 lb 9.6 oz (104.6 kg). Her oral temperature is 97.8 °F (36.6 °C). Her blood pressure is 120/82 and her pulse is 71. Her respiration is 16 and oxygen saturation is 96%. Diagnosis Orders   1. Wellness examination     2. Muscle spasm of back     3. Chronic bilateral low back pain, unspecified whether sciatica present         Plan:  Wellness visit. Refill flexeril. Refer to PT. Labs as ordered. Health Maintenance Due   Topic Date Due    Pneumococcal 0-64 years Vaccine (1 - PCV) Never done    DTaP/Tdap/Td vaccine (1 - Tdap) Never done    Shingles vaccine (1 of 2) Never done    COVID-19 Vaccine (2 - Marcy risk 3-dose series) 04/10/2021    Depression Screen  06/30/2022       Attending Physician Statement  I have discussed the case, including pertinent history and exam findings with the resident. I agree with the documented assessment and plan as documented by the resident.         Patty Hernandez DO 6/23/2022 2:03 PM

## 2022-06-23 NOTE — PROGRESS NOTES
Health Maintenance Due   Topic Date Due    Pneumococcal 0-64 years Vaccine (1 - PCV) Never done    DTaP/Tdap/Td vaccine (1 - Tdap) Never done    Shingles vaccine (1 of 2) Never done    COVID-19 Vaccine (2 - Marcy risk 3-dose series) 04/10/2021    Depression Screen  06/30/2022

## 2022-06-23 NOTE — PROGRESS NOTES
[address] [place] [address] [facility] [address]  Dept: [phone]  Dept Fax: [phone]  Loc: Emilee Araujo is a 64 y.o. female who presents today for:  Chief Complaint   Patient presents with    Follow-up       Goals      Exercise 3x per week (30 min per time)           HPI:     HPI  Here for wellness exam. Doing well. Needs refill for flexeril for back pain, would also like to try PT for low back pain. Due for lab work. Oxybutynin working well for urinary incontinence. Past Medical History:   Diagnosis Date    Anemia     Edema     Endometrial cancer ([address]     cancer free 21    Infertility female     Obesity     SBO (small bowel obstruction) (HCC)       Past Surgical History:   Procedure Laterality Date    DILATION AND CURETTAGE OF UTERUS N/A 2020    DILATATION AND CURETTAGE HYSTEROSCOPY performed by Sanna Krishnan MD at [address] [place] [address]      ENDOMETRIAL BIOPSY  2021    HYSTERECTOMY, TOTAL ABDOMINAL (CERVIX REMOVED)  2021    OVARY REMOVAL      1    SD TOTAL ABDOM HYSTERECTOMY       Family History   Problem Relation Age of Onset    Heart Disease Mother     Pancreatic Cancer Father     Stomach Cancer Brother     Cancer Brother         Stomach Cancer    Glaucoma Neg Hx     Colon Cancer Neg Hx     Colon Polyps Neg Hx     Esophageal Cancer Neg Hx      Social History     Tobacco Use    Smoking status: Former Smoker     Packs/day: 0.00     Years: 5.00     Pack years: 0.00     Types: Cigars     Quit date: 2011     Years since quittin.1    Smokeless tobacco: Never Used   Substance Use Topics    Alcohol use:  Yes     Alcohol/week: 2.0 standard drinks     Types: 1 Glasses of wine, 1 Cans of beer per week     Comment: occasional      Current Outpatient Medications   Medication Sig Dispense Refill    cyclobenzaprine (FLEXERIL) 10 MG tablet Take 1 tablet by mouth 3 times daily as needed for Muscle spasms 30 tablet 1    oxybutynin (DITROPAN XL) 10 MG extended release tablet Take 1 tablet by mouth daily 30 tablet 3    fluticasone (FLONASE) 50 MCG/ACT nasal spray 2 sprays by Each Nostril route daily 16 g 0    albuterol sulfate HFA (VENTOLIN HFA) 108 (90 Base) MCG/ACT inhaler Inhale 2 puffs into the lungs every 4 hours as needed for Wheezing or Shortness of Breath 8 g 5     No current facility-administered medications for this visit. No Known Allergies    Health Maintenance   Topic Date Due    Pneumococcal 0-64 years Vaccine (1 - PCV) Never done    DTaP/Tdap/Td vaccine (1 - Tdap) Never done    Shingles vaccine (1 of 2) Never done    COVID-19 Vaccine (2 - Marcy risk 3-dose series) 04/10/2021    Depression Screen  06/30/2022    Flu vaccine (Season Ended) 09/01/2022    Breast cancer screen  07/20/2023    Lipids  06/23/2027    Colorectal Cancer Screen  08/26/2031    Hepatitis C screen  Completed    HIV screen  Completed    Hepatitis A vaccine  Aged Out    Hepatitis B vaccine  Aged Out    Hib vaccine  Aged Out    Meningococcal (ACWY) vaccine  Aged Out       Subjective:      Review of Systems   Constitutional: Negative for chills, fatigue and fever. HENT: Negative for ear pain, postnasal drip and trouble swallowing. Eyes: Negative for pain and visual disturbance. Respiratory: Negative for cough and shortness of breath. Cardiovascular: Negative for chest pain and palpitations. Gastrointestinal: Negative for abdominal pain, blood in stool, constipation, diarrhea, nausea and vomiting. Genitourinary: Negative for dysuria and urgency. Skin: Negative for rash and wound. Neurological: Negative for dizziness and headaches. Psychiatric/Behavioral: Negative for dysphoric mood. The patient is not nervous/anxious.         Objective:     Vitals:    06/23/22 1344   BP: 120/82   Site: Left Upper Arm   Position: Sitting   Cuff Size: Medium Adult   Pulse: 71   Resp: 16   Temp: 97.8 °F (36.6 °C)   TempSrc: Oral   SpO2: 96%   Weight: 230 lb 9.6 oz (104.6 kg)   Height: 4' 11\" (1.499 m)       Body mass index is 46.58 kg/m². Wt Readings from Last 3 Encounters:   06/23/22 230 lb 9.6 oz (104.6 kg)   05/10/22 221 lb 12.8 oz (100.6 kg)   12/14/21 210 lb (95.3 kg)     BP Readings from Last 3 Encounters:   06/23/22 120/82   05/10/22 124/70   12/14/21 134/80       Physical Exam  Vitals and nursing note reviewed. Constitutional:       General: She is not in acute distress. Appearance: She is well-developed. She is not diaphoretic. HENT:      Head: Normocephalic and atraumatic. Right Ear: External ear normal.      Left Ear: External ear normal.      Nose: Nose normal.   Eyes:      General: No scleral icterus. Right eye: No discharge. Left eye: No discharge. Conjunctiva/sclera: Conjunctivae normal.   Cardiovascular:      Rate and Rhythm: Normal rate and regular rhythm. Heart sounds: Normal heart sounds. No murmur heard. Pulmonary:      Effort: Pulmonary effort is normal.      Breath sounds: Normal breath sounds. Musculoskeletal:      Cervical back: Normal range of motion. Skin:     General: Skin is warm and dry. Findings: No erythema or rash. Neurological:      Mental Status: She is alert and oriented to person, place, and time. Cranial Nerves: No cranial nerve deficit. Psychiatric:         Behavior: Behavior normal.         Thought Content:  Thought content normal.         Judgment: Judgment normal.         Lab Results   Component Value Date    WBC 4.4 (L) 06/23/2022    HGB 13.8 06/23/2022    HCT 44.0 06/23/2022     06/23/2022    CHOL 208 (H) 06/23/2022    TRIG 74 06/23/2022    HDL 81 06/23/2022    LDLCALC 112 06/23/2022    AST 18 06/23/2022     06/23/2022    K 3.8 06/23/2022     06/23/2022    CREATININE 0.6 06/23/2022    BUN 11 06/23/2022 CO2 29 06/23/2022    TSH 1.160 06/23/2022    LABGLOM >90 06/23/2022    MG 2.0 11/18/2020    CALCIUM 9.4 06/23/2022    VITD25 26 (L) 11/03/2020       Imaging Results:    No results found. Assessment/Plan:     Amy Rivera was seen today for follow-up. Diagnoses and all orders for this visit:    Wellness examination  -     CBC with Auto Differential; Future  -     Comprehensive Metabolic Panel; Future  -     Lipid Panel; Future  -     TSH; Future  -     T4, Free; Future    Muscle spasm of back  -     cyclobenzaprine (FLEXERIL) 10 MG tablet; Take 1 tablet by mouth 3 times daily as needed for Muscle spasms    Chronic bilateral low back pain, unspecified whether sciatica present  -     [facility] - [facility]    Class 3 severe obesity without serious comorbidity in adult, unspecified BMI, unspecified obesity type ([facility] [address]        Labs as ordered. Refer to PT for low back pain. Refill flexeril. F/u in 2 months after PT for back pain. Return in about 2 months (around 8/23/2022) for follow up Back pain, PT.WIth Dr. Concepcion Milner. .      Medications Prescribed:  Orders Placed This Encounter   Medications    cyclobenzaprine (FLEXERIL) 10 MG tablet     Sig: Take 1 tablet by mouth 3 times daily as needed for Muscle spasms     Dispense:  30 tablet     Refill:  1       Future Appointments   Date Time Provider Bailey Rocha   8/26/2022 11:00 AM Virginia Garcia DO SRPX  [facility] [facility]       Patient given educational materials - see patient instructions. Discussed use, benefit, and sideeffects of prescribed medications. All patient questions answered. Pt voiced understanding. Reviewed health maintenance. Instructed to continue current medications, diet and exercise. Patient agreed with treatment plan. Follow up as directed.      Electronically signed by Isabel Elizalde DO on 6/23/2022 at 4:27 PM

## 2022-06-29 ENCOUNTER — TELEPHONE (OUTPATIENT)
Dept: FAMILY MEDICINE CLINIC | Age: 62
End: 2022-06-29

## 2022-06-29 NOTE — TELEPHONE ENCOUNTER
----- Message from Zuñigaozzie Guzman DO sent at 6/23/2022  3:46 PM EDT -----  Lab work generally unremarkable. F/u as scheduled.

## 2022-08-28 ENCOUNTER — APPOINTMENT (OUTPATIENT)
Dept: GENERAL RADIOLOGY | Age: 62
End: 2022-08-28
Payer: MEDICARE

## 2022-08-28 ENCOUNTER — HOSPITAL ENCOUNTER (EMERGENCY)
Age: 62
Discharge: HOME | End: 2022-08-28
Attending: STUDENT IN AN ORGANIZED HEALTH CARE EDUCATION/TRAINING PROGRAM
Payer: MEDICARE

## 2022-08-28 VITALS
OXYGEN SATURATION: 98 % | HEART RATE: 94 BPM | SYSTOLIC BLOOD PRESSURE: 139 MMHG | TEMPERATURE: 98.3 F | RESPIRATION RATE: 20 BRPM | DIASTOLIC BLOOD PRESSURE: 90 MMHG

## 2022-08-28 DIAGNOSIS — M72.2 PLANTAR FASCIITIS: Primary | ICD-10-CM

## 2022-08-28 PROCEDURE — 73630 X-RAY EXAM OF FOOT: CPT

## 2022-08-28 PROCEDURE — 99283 EMERGENCY DEPT VISIT LOW MDM: CPT

## 2022-08-28 RX ORDER — IBUPROFEN 200 MG
600 TABLET ORAL ONCE
Status: DISCONTINUED | OUTPATIENT
Start: 2022-08-28 | End: 2022-08-28 | Stop reason: HOSPADM

## 2022-08-28 RX ORDER — KETOROLAC TROMETHAMINE 30 MG/ML
15 INJECTION, SOLUTION INTRAMUSCULAR; INTRAVENOUS ONCE
Status: DISCONTINUED | OUTPATIENT
Start: 2022-08-28 | End: 2022-08-28

## 2022-08-28 ASSESSMENT — PAIN DESCRIPTION - ORIENTATION: ORIENTATION: LEFT;RIGHT

## 2022-08-28 ASSESSMENT — PAIN DESCRIPTION - LOCATION: LOCATION: FOOT

## 2022-08-28 ASSESSMENT — ENCOUNTER SYMPTOMS
NAUSEA: 0
ABDOMINAL PAIN: 0
SORE THROAT: 0
COUGH: 0
SHORTNESS OF BREATH: 0
COLOR CHANGE: 0
VOMITING: 0

## 2022-08-28 ASSESSMENT — PAIN - FUNCTIONAL ASSESSMENT: PAIN_FUNCTIONAL_ASSESSMENT: 0-10

## 2022-08-28 ASSESSMENT — PAIN SCALES - GENERAL: PAINLEVEL_OUTOF10: 7

## 2022-08-28 NOTE — ED NOTES
Patient to ED for blisters on the bottom of her foot due to new shoes she has been wearing      Karen Haynes RN  08/28/22 [id number]

## 2022-08-28 NOTE — DISCHARGE INSTRUCTIONS
Call today or tomorrow to follow up with [facility] in one week. Use an ice pack or bag filled with ice and apply to the injured area 3 - 4 times a day for 15 - 20 minutes each time. Use ibuprofen or Tylenol (unless prescribed medications that have Tylenol in it) for pain. You can take over the counter Ibuprofen (advil) tablets (4 every 8 hours or 3 every 6 hours or 2 every 4 hours)      Return to the Emergency Department for worsening of pain, increase swelling to the ankle, inability to move your toes, any other care or concern.

## 2022-08-28 NOTE — ED NOTES
**This is a Medical/ PA/ APRN Student Note and is charted for educational purposes. The non-physician staff attested note is not to be used for billing purposes or to guide patient care. Please see the physician modifications/ attestation for treatment plan/suggestions. This note has been reviewed and feedback has been provided to the student. **    [facility] physician, Dr. Guy Obrien   ED visit Note  Pt Name: Haven Sam  Medical Record Number: [id number]  Date of Birth 1960   Today's Date: 8/28/2022    CHIEF COMPLAINT:     Chief Complaint   Patient presents with    Foot Pain       HISTORY OF PRESENT ILLNESS   Terry Sarmiento is a 58 y.o. female who presents to the ED c/o bilateral foot pain since starting her new job at [employer] on August 9, 2022. She describes it as a non radiating sharp pain in the bottom of her foot worst on the left than right. She stated that she began to walk with a limp so she decided to come in for evaluation. She reported that at work she has to wear steel toe boots and is on her feet for 8 hours out of the day. She reports that at a previous job in 2013 she had similar symptoms but that they resolved after she quit that job. She does wear Dr. Sean Wolf insoles without relief. She reports some relief in pain when wearing slippers at home. She denies taking any medications for the pain. She denies any recent trauma, fever, chills. REVIEW OF SYSTEMS:    Review of Systems   Constitutional:  Positive for activity change. Negative for chills and fever. HENT:  Negative for congestion and sore throat. Respiratory:  Negative for cough and shortness of breath. Cardiovascular:  Positive for leg swelling. Negative for chest pain. Gastrointestinal:  Negative for abdominal pain, nausea and vomiting. Genitourinary:  Negative for dysuria and flank pain. Musculoskeletal:  Positive for gait problem.    Skin: Negative for color change, rash and wound. Neurological:  Negative for dizziness, weakness and numbness. PAST MEDICAL HISTORY:     Past Medical History:   Diagnosis Date    Anemia     Edema     Endometrial cancer ([address]     cancer free 21    Infertility female     Obesity     SBO (small bowel obstruction) [address] [address]        SURGICAL HISTORY:     Past Surgical History:   Procedure Laterality Date    DILATION AND CURETTAGE OF UTERUS N/A 2020    DILATATION AND CURETTAGE HYSTEROSCOPY performed by Jazz Begum MD at [address] [place] [address] BIOPSY  2021    HYSTERECTOMY, TOTAL ABDOMINAL (CERVIX REMOVED)  2021    OVARY REMOVAL  2009    [place] TOTAL ABDOM HYSTERECTOMY         MEDICATIONS   Scheduled Meds: Oxybutynin     ALLERGIES:   No Known Allergies    FAMILY HISTORY:     Family History   Problem Relation Age of Onset    Heart Disease Mother     Pancreatic Cancer Father     Stomach Cancer Brother     Cancer Brother         Stomach Cancer    Glaucoma Neg Hx     Colon Cancer Neg Hx     Colon Polyps Neg Hx     Esophageal Cancer Neg Hx        SOCIAL HISTORY:     Social History     Tobacco Use    Smoking status: Former     Packs/day: 0.00     Years: 5.00     Pack years: 0.00     Types: Cigars, Cigarettes     Quit date: 2011     Years since quittin.3    Smokeless tobacco: Never   Substance Use Topics    Alcohol use: Yes     Alcohol/week: 2.0 standard drinks     Types: 1 Glasses of wine, 1 Cans of beer per week     Comment: occasional   Works for [employer] in a [employer]. VITAL SIGNS   CURRENT VITALS:  oral temperature is 98.3 °F (36.8 °C). Her blood pressure is 139/90 (abnormal) and her pulse is 94. Her respiration is 20 and oxygen saturation is 98%.    Temperature Range (24h):Temp: 98.3 °F (36.8 °C) Temp  Av.3 °F (36.8 °C)  Min: 98.3 °F (36.8 °C)  Max: 98.3 °F (36.8 °C)  BP Range (06M): Systolic (98UFW), NCP:458 , Min:139 , Max:139 Diastolic (06VJY), CBP:25, Min:90, Max:90    Pulse Range (24h): Pulse  Av  Min: 94  Max: 94  Respiration Range (24h): Resp  Av  Min: 20  Max: 20  Current Pulse Ox (24h):  SpO2: 98 %  Pulse Ox Range (24h):  SpO2  Av %  Min: 98 %  Max: 98 %  Oxygen Amount and Delivery:    Initial vital signs and nursing assessment reviewed and normal. Pulsoximetry is normal per my interpretation. PHYSICAL EXAM:   Physical Exam  Constitutional:       Appearance: Normal appearance. She is obese. HENT:      Head: Normocephalic. Nose: Nose normal.   Cardiovascular:      Rate and Rhythm: Normal rate and regular rhythm. Pulses: Normal pulses. Heart sounds: Normal heart sounds. No murmur heard. Pulmonary:      Effort: Pulmonary effort is normal.      Breath sounds: Normal breath sounds. Abdominal:      Palpations: Abdomen is soft. Tenderness: There is no abdominal tenderness. There is no guarding. Musculoskeletal:      Cervical back: Normal range of motion. Right lower leg: Edema present. Left lower leg: Edema present. Skin:     General: Skin is warm. Capillary Refill: Capillary refill takes less than 2 seconds. Comments: Left Foot: Point tenderness over calcaneus, Pain in arch worse with dorsiflexion   Right Foot: Point tenderness over calcaneus, Pain in arch worse with dorsiflexion      Neurological:      General: No focal deficit present. Mental Status: She is alert and oriented to person, place, and time.    Psychiatric:         Mood and Affect: Mood normal.         Behavior: Behavior normal.       LABS   Labs Reviewed - No data to display    RADIOLOGY     XR FOOT RIGHT (MIN 3 VIEWS)    (Results Pending)   XR FOOT LEFT (MIN 3 VIEWS)    (Results Pending)       DIFFERENTIALS:   Plantar Fasciitis   Bone Spur   Calcaneal fracture   Heel pad syndrome     PLAN:   XR bilaterally feet   NSAID for pain   Try new Dr. Joseph Luevano orthotics   Follow up with ortho due to impaired

## 2022-08-28 NOTE — ED PROVIDER NOTES
[address] [address]          Pt Name: Thea Gabriel  MRN: [id number]  [place] 1960  Date of evaluation: 8/28/2022  Treating Resident Physician: Silvia Ortiz MD  Supervising Physician: Dr. Jennifer Whalen       Chief Complaint   Patient presents with    Foot Pain     History obtained from the patient. HISTORY OF PRESENT ILLNESS    HPI  Thea Gabriel is a 58 y.o. female with PMHx of HTN who presents to the emergency department for evaluation of b/l heel pain and foot pain one month ago. States that it began as she started a new job at [employer] which requires her to be on her feet all day long. Her symptoms improved when she left her previous job, where she was also on her feet all day. She decided to come in today because the pain worsened and she developed a limp due to pain. She states that she stands for 8 hours at a time at her job at [employer] and is required to wear steel toe boots which make the pain worse. She says the pain is worse under her left foot. She has had some swelling also but denies fevers, chills, myalgias. The patient has no other acute complaints at this time. REVIEW OF SYSTEMS   Review of Systems   Musculoskeletal:  Positive for arthralgias and gait problem. All other systems reviewed and are negative.       PAST MEDICAL AND SURGICAL HISTORY     Past Medical History:   Diagnosis Date    Anemia     Edema     Endometrial cancer ([address]     cancer free 7/21/21    Infertility female     Obesity     SBO (small bowel obstruction) ([facility] [address]      Past Surgical History:   Procedure Laterality Date    DILATION AND CURETTAGE OF UTERUS N/A 12/30/2020    DILATATION AND CURETTAGE HYSTEROSCOPY performed by Bruna Ray MD at [address] [place] [address] BIOPSY  02/2021    HYSTERECTOMY, TOTAL ABDOMINAL (CERVIX REMOVED)  03/13/2021    OVARY REMOVAL  2009 1    [place] TOTAL ABDOM HYSTERECTOMY           MEDICATIONS     Current Facility-Administered Medications:     ketorolac (TORADOL) injection 15 mg, 15 mg, IntraMUSCular, Once, Angel Francisco MD    Current Outpatient Medications:     cyclobenzaprine (FLEXERIL) 10 MG tablet, Take 1 tablet by mouth 3 times daily as needed for Muscle spasms, Disp: 30 tablet, Rfl: 1    oxybutynin (DITROPAN XL) 10 MG extended release tablet, Take 1 tablet by mouth daily, Disp: 30 tablet, Rfl: 3    fluticasone (FLONASE) 50 MCG/ACT nasal spray, 2 sprays by Each Nostril route daily, Disp: 16 g, Rfl: 0    albuterol sulfate HFA (VENTOLIN HFA) 108 (90 Base) MCG/ACT inhaler, Inhale 2 puffs into the lungs every 4 hours as needed for Wheezing or Shortness of Breath, Disp: 8 g, Rfl: 5      SOCIAL HISTORY     Social History     Social History Narrative    Not on file     Social History     Tobacco Use    Smoking status: Former     Packs/day: 0.00     Years: 5.00     Pack years: 0.00     Types: Cigars, Cigarettes     Quit date: 2011     Years since quittin.3    Smokeless tobacco: Never   Vaping Use    Vaping Use: Never used   Substance Use Topics    Alcohol use: Yes     Alcohol/week: 2.0 standard drinks     Types: 1 Glasses of wine, 1 Cans of beer per week     Comment: occasional    Drug use: No         ALLERGIES   No Known Allergies      FAMILY HISTORY     Family History   Problem Relation Age of Onset    Heart Disease Mother     Pancreatic Cancer Father     Stomach Cancer Brother     Cancer Brother         Stomach Cancer    Glaucoma Neg Hx     Colon Cancer Neg Hx     Colon Polyps Neg Hx     Esophageal Cancer Neg Hx          PREVIOUS RECORDS   Previous records reviewed: I reviewed the patient's past medical records including relevant labs, imaging and procedures.           PHYSICAL EXAM     ED Triage Vitals [22 1430]   BP Temp Temp Source Heart Rate Resp SpO2 Height Weight   (!) 139/90 98.3 °F (36.8 °C) Oral 94 20 98 % -- --     Initial vital signs and nursing assessment reviewed and abnormal from hypertension . There is no height or weight on file to calculate BMI. Pulsoximetry is normal per my interpretation. Additional Vital Signs:  Vitals:    08/28/22 1430   BP: (!) 139/90   Pulse: 94   Resp: 20   Temp: 98.3 °F (36.8 °C)   SpO2: 98%       Physical Exam  Constitutional:       Appearance: She is obese. She is not ill-appearing. HENT:      Head: Normocephalic and atraumatic. Right Ear: External ear normal.      Left Ear: External ear normal.      Nose: Nose normal.      Mouth/Throat:      Mouth: Mucous membranes are moist.      Pharynx: Oropharynx is clear. Eyes:      Conjunctiva/sclera: Conjunctivae normal.   Cardiovascular:      Rate and Rhythm: Normal rate. Pulmonary:      Effort: Pulmonary effort is normal.   Abdominal:      General: Abdomen is flat. Feet:      Comments: Pain over the plantar surface of both feet bilaterally with dorsi flexion and palpation of the plantar surface, worse on the left than the right. Tenderness to palpation of the left heel. No pain over the Achilles tendons bilaterally. Nonpitting edema localized to bilateral ankles  Skin:     General: Skin is warm and dry. Neurological:      General: No focal deficit present. Mental Status: She is alert and oriented to person, place, and time. Mental status is at baseline. GCS: GCS eye subscore is 4. GCS verbal subscore is 5. GCS motor subscore is 6. Motor: Motor function is intact. Psychiatric:         Mood and Affect: Mood normal.         Behavior: Behavior normal.           ED RESULTS   Laboratory results:  Labs Reviewed - No data to display    Radiologic studies results:  XR FOOT LEFT (MIN 3 VIEWS)   Final Result   1. Bilateral pes planus. Question soft tissue swelling along the plantar aspect of both feet. Clinical correlation recommended. 2. Bilateral plantar calcaneal spurs, larger on the left.             **This report has been created using voice recognition software. It may contain minor errors which are inherent in voice recognition technology. **      Final report electronically signed by Dr. Bari Ascencio on 8/28/2022 4:54 PM      XR FOOT RIGHT (MIN 3 VIEWS)   Final Result   1. Bilateral pes planus. Question soft tissue swelling along the plantar aspect of both feet. Clinical correlation recommended. 2. Bilateral plantar calcaneal spurs, larger on the left. **This report has been created using voice recognition software. It may contain minor errors which are inherent in voice recognition technology. **      Final report electronically signed by Dr. Bari Ascencio on 8/28/2022 4:54 PM          ED Medications administered this visit:   Medications   ketorolac (TORADOL) injection 15 mg (has no administration in time range)         ED COURSE     ED Course as of 08/29/22 0008   Sun Aug 28, 2022   1700 IMPRESSION:  1. Bilateral pes planus. Question soft tissue swelling along the plantar aspect of both feet. Clinical correlation recommended. 2. Bilateral plantar calcaneal spurs, larger on the left. [JT]      ED Course User Index  [JT] Angel Francisco MD           MEDICAL DECISION MAKING   Initial Assessment: This is a 69-year-old female coming in with worsening plantar surface pain associated with standing for her job in steel toed boots. On exam does walk with a limp favoring the right side and does have plantar surface tenderness. Plain films of b/l feet  NSAIDs  Orthothotics  Referral to orthopedics    Given the patient's above chief complaint and findings on history and physical examination, I thought it was appropriate to consider the following emergency medical conditions:  Planter fasciitis, calcaneal stress fractures, Achilles tendinopathy, fracture of the foot, bone spur  Although some of these diagnoses are unlikely they were considered in my medical decision making.     MDM: No fractures of the feet seen or findings consistent with stress fractures. Pes planus was seen on XR and she does have physical exam findings consistent with plantar fasciitis. The patient refused pain medications here in the ED but she was encouraged to use NSAIDs at home, use in soles in her shoes and I also did give her a referral to the orthopedics specialist.       Strict return precautions and follow up instructions were discussed with the patient prior to discharge, with which the patient agrees. MEDICATION CHANGES     New Prescriptions    No medications on file         FINAL DISPOSITION     Final diagnoses:   Plantar fasciitis     Condition: condition: fair  Dispo: Discharge to home      This transcription was electronically signed. Parts of this transcriptions may have been dictated by use of voice recognition software and electronically transcribed, and parts may have been transcribed with the assistance of an ED scribe. The transcription may contain errors not detected in proofreading. Please refer to my supervising physician's documentation if my documentation differs.     Electronically Signed: Katty Payne MD, 08/28/22, 5:03 PM          Katty Payne MD  Resident  08/29/22 0611

## 2022-08-28 NOTE — ED PROVIDER NOTES
**This is a Medical/ PA/ APRN Student Note and is charted for educational purposes. The non-physician staff attested note is not to be used for billing purposes or to guide patient care. Please see the physician modifications/ attestation for treatment plan/suggestions. This note has been reviewed and feedback has been provided to the student. **    2500 [facility] physician, Dr. Guy Obrien   ED visit Note  Pt Name: Haven Sam  Medical Record Number: [id number]  Date of Birth 1960   Today's Date: 8/28/2022    CHIEF COMPLAINT:     Chief Complaint   Patient presents with    Foot Pain       HISTORY OF PRESENT ILLNESS   Terry Sarmiento is a 58 y.o. female who presents to the ED c/o bilateral foot pain since starting her new job at [employer] on August 9, 2022. She describes it as a non radiating sharp pain in the bottom of her foot worst on the left than right. She stated that she began to walk with a limp so she decided to come in for evaluation. She reported that at work she has to wear steel toe boots and is on her feet for 8 hours out of the day. She reports that at a previous job in 2013 she had similar symptoms but that they resolved after she quit that job. She does wear Dr. Sean Wolf insoles without relief. She reports some relief in pain when wearing slippers at home. She denies taking any medications for the pain. She denies any recent trauma, fever, chills. REVIEW OF SYSTEMS:    Review of Systems   Constitutional:  Positive for activity change. Negative for chills and fever. HENT:  Negative for congestion and sore throat. Respiratory:  Negative for cough and shortness of breath. Cardiovascular:  Positive for leg swelling. Negative for chest pain. Gastrointestinal:  Negative for abdominal pain, nausea and vomiting. Genitourinary:  Negative for dysuria and flank pain. Musculoskeletal:  Positive for gait problem.    Skin: Negative for color change, rash and wound. Neurological:  Negative for dizziness, weakness and numbness. PAST MEDICAL HISTORY:     Past Medical History:   Diagnosis Date    Anemia     Edema     Endometrial cancer ([facility] [address]     cancer free 21    Infertility female     Obesity     SBO (small bowel obstruction) ([facility] [address]        SURGICAL HISTORY:     Past Surgical History:   Procedure Laterality Date    DILATION AND CURETTAGE OF UTERUS N/A 2020    DILATATION AND CURETTAGE HYSTEROSCOPY performed by Yo Chandler MD at [address] [place] [address] BIOPSY  2021    HYSTERECTOMY, TOTAL ABDOMINAL (CERVIX REMOVED)  2021    OVARY REMOVAL  2009    [place] TOTAL ABDOM HYSTERECTOMY         MEDICATIONS   Scheduled Meds: Oxybutynin     ALLERGIES:   No Known Allergies    FAMILY HISTORY:     Family History   Problem Relation Age of Onset    Heart Disease Mother     Pancreatic Cancer Father     Stomach Cancer Brother     Cancer Brother         Stomach Cancer    Glaucoma Neg Hx     Colon Cancer Neg Hx     Colon Polyps Neg Hx     Esophageal Cancer Neg Hx        SOCIAL HISTORY:     Social History     Tobacco Use    Smoking status: Former     Packs/day: 0.00     Years: 5.00     Pack years: 0.00     Types: Cigars, Cigarettes     Quit date: 2011     Years since quittin.3    Smokeless tobacco: Never   Substance Use Topics    Alcohol use: Yes     Alcohol/week: 2.0 standard drinks     Types: 1 Glasses of wine, 1 Cans of beer per week     Comment: occasional   Works for [employer] in a [employer]. VITAL SIGNS   CURRENT VITALS:  oral temperature is 98.3 °F (36.8 °C). Her blood pressure is 139/90 (abnormal) and her pulse is 94. Her respiration is 20 and oxygen saturation is 98%.    Temperature Range (24h):Temp: 98.3 °F (36.8 °C) Temp  Av.3 °F (36.8 °C)  Min: 98.3 °F (36.8 °C)  Max: 98.3 °F (36.8 °C)  BP Range (89S): Systolic (50RCS), SJP:218 , Min:139 , Max:139 Diastolic (36PEX), QWP:21, Min:90, Max:90    Pulse Range (24h): Pulse  Av  Min: 94  Max: 94  Respiration Range (24h): Resp  Av  Min: 20  Max: 20  Current Pulse Ox (24h):  SpO2: 98 %  Pulse Ox Range (24h):  SpO2  Av %  Min: 98 %  Max: 98 %  Oxygen Amount and Delivery:    Initial vital signs and nursing assessment reviewed and normal. Pulsoximetry is normal per my interpretation. PHYSICAL EXAM:   Physical Exam  Constitutional:       Appearance: Normal appearance. She is obese. HENT:      Head: Normocephalic. Nose: Nose normal.   Cardiovascular:      Rate and Rhythm: Normal rate and regular rhythm. Pulses: Normal pulses. Heart sounds: Normal heart sounds. No murmur heard. Pulmonary:      Effort: Pulmonary effort is normal.      Breath sounds: Normal breath sounds. Abdominal:      Palpations: Abdomen is soft. Tenderness: There is no abdominal tenderness. There is no guarding. Musculoskeletal:      Cervical back: Normal range of motion. Right lower leg: Edema present. Left lower leg: Edema present. Skin:     General: Skin is warm. Capillary Refill: Capillary refill takes less than 2 seconds. Comments: Left Foot: Point tenderness over calcaneus, Pain in arch worse with dorsiflexion   Right Foot: Point tenderness over calcaneus, Pain in arch worse with dorsiflexion      Neurological:      General: No focal deficit present. Mental Status: She is alert and oriented to person, place, and time.    Psychiatric:         Mood and Affect: Mood normal.         Behavior: Behavior normal.       LABS   Labs Reviewed - No data to display    RADIOLOGY     XR FOOT RIGHT (MIN 3 VIEWS)    (Results Pending)   XR FOOT LEFT (MIN 3 VIEWS)    (Results Pending)       DIFFERENTIALS:   Plantar Fasciitis   Bone Spur   Calcaneal fracture   Heel pad syndrome     PLAN:   XR bilaterally feet   NSAID for pain   Try new Dr. Clara Maddox orthotics   Follow up with ortho due to impaired walking. Electronically signed by Yancy Trammell on 8/28/2022 at 4:30 PM   **This is a Medical/ PA/ APRN Student Note and is charted for educational purposes. The non-physician staff attested note is not to be used for billing purposes or to guide patient care. Please see the physician modifications/ attestation for treatment plan/suggestions. This note has been reviewed and feedback has been provided to the student.  Armando Mcgovern [facility]  08/28/22 [address] [facility]  08/28/22 [id number]

## 2022-10-05 ENCOUNTER — TELEPHONE (OUTPATIENT)
Dept: FAMILY MEDICINE CLINIC | Age: 62
End: 2022-10-05

## 2022-10-05 ENCOUNTER — OFFICE VISIT (OUTPATIENT)
Dept: FAMILY MEDICINE CLINIC | Age: 62
End: 2022-10-05
Payer: MEDICARE

## 2022-10-05 VITALS
RESPIRATION RATE: 12 BRPM | WEIGHT: 221.4 LBS | HEIGHT: 59 IN | SYSTOLIC BLOOD PRESSURE: 132 MMHG | HEART RATE: 70 BPM | DIASTOLIC BLOOD PRESSURE: 84 MMHG | BODY MASS INDEX: 44.64 KG/M2 | TEMPERATURE: 98.9 F

## 2022-10-05 DIAGNOSIS — R06.02 SHORTNESS OF BREATH: ICD-10-CM

## 2022-10-05 DIAGNOSIS — M62.830 MUSCLE SPASM OF BACK: ICD-10-CM

## 2022-10-05 DIAGNOSIS — N39.41 URGE INCONTINENCE OF URINE: ICD-10-CM

## 2022-10-05 DIAGNOSIS — M25.512 ACUTE PAIN OF LEFT SHOULDER: Primary | ICD-10-CM

## 2022-10-05 PROCEDURE — 3017F COLORECTAL CA SCREEN DOC REV: CPT | Performed by: STUDENT IN AN ORGANIZED HEALTH CARE EDUCATION/TRAINING PROGRAM

## 2022-10-05 PROCEDURE — 1036F TOBACCO NON-USER: CPT | Performed by: STUDENT IN AN ORGANIZED HEALTH CARE EDUCATION/TRAINING PROGRAM

## 2022-10-05 PROCEDURE — G8417 CALC BMI ABV UP PARAM F/U: HCPCS | Performed by: STUDENT IN AN ORGANIZED HEALTH CARE EDUCATION/TRAINING PROGRAM

## 2022-10-05 PROCEDURE — G8484 FLU IMMUNIZE NO ADMIN: HCPCS | Performed by: STUDENT IN AN ORGANIZED HEALTH CARE EDUCATION/TRAINING PROGRAM

## 2022-10-05 PROCEDURE — G8427 DOCREV CUR MEDS BY ELIG CLIN: HCPCS | Performed by: STUDENT IN AN ORGANIZED HEALTH CARE EDUCATION/TRAINING PROGRAM

## 2022-10-05 PROCEDURE — 99213 OFFICE O/P EST LOW 20 MIN: CPT | Performed by: STUDENT IN AN ORGANIZED HEALTH CARE EDUCATION/TRAINING PROGRAM

## 2022-10-05 RX ORDER — IBUPROFEN 600 MG/1
600 TABLET ORAL 3 TIMES DAILY PRN
Qty: 21 TABLET | Refills: 0 | Status: SHIPPED | OUTPATIENT
Start: 2022-10-05 | End: 2022-10-12

## 2022-10-05 RX ORDER — ALBUTEROL SULFATE 90 UG/1
2 AEROSOL, METERED RESPIRATORY (INHALATION) EVERY 4 HOURS PRN
Qty: 8 G | Refills: 5 | Status: SHIPPED | OUTPATIENT
Start: 2022-10-05

## 2022-10-05 RX ORDER — FLUTICASONE PROPIONATE 50 MCG
2 SPRAY, SUSPENSION (ML) NASAL DAILY
Qty: 16 G | Refills: 0 | Status: SHIPPED | OUTPATIENT
Start: 2022-10-05

## 2022-10-05 RX ORDER — CYCLOBENZAPRINE HCL 5 MG
5 TABLET ORAL 3 TIMES DAILY PRN
Qty: 30 TABLET | Refills: 1 | Status: SHIPPED | OUTPATIENT
Start: 2022-10-05 | End: 2022-11-04

## 2022-10-05 RX ORDER — OXYBUTYNIN CHLORIDE 10 MG/1
10 TABLET, EXTENDED RELEASE ORAL DAILY
Qty: 30 TABLET | Refills: 3 | Status: SHIPPED | OUTPATIENT
Start: 2022-10-05

## 2022-10-05 SDOH — ECONOMIC STABILITY: FOOD INSECURITY: WITHIN THE PAST 12 MONTHS, YOU WORRIED THAT YOUR FOOD WOULD RUN OUT BEFORE YOU GOT MONEY TO BUY MORE.: NEVER TRUE

## 2022-10-05 SDOH — ECONOMIC STABILITY: FOOD INSECURITY: WITHIN THE PAST 12 MONTHS, THE FOOD YOU BOUGHT JUST DIDN'T LAST AND YOU DIDN'T HAVE MONEY TO GET MORE.: NEVER TRUE

## 2022-10-05 ASSESSMENT — ENCOUNTER SYMPTOMS
ABDOMINAL PAIN: 0
SHORTNESS OF BREATH: 0
NAUSEA: 0
COUGH: 0
SORE THROAT: 0

## 2022-10-05 ASSESSMENT — SOCIAL DETERMINANTS OF HEALTH (SDOH): HOW HARD IS IT FOR YOU TO PAY FOR THE VERY BASICS LIKE FOOD, HOUSING, MEDICAL CARE, AND HEATING?: NOT HARD AT ALL

## 2022-10-05 NOTE — TELEPHONE ENCOUNTER
----- Message from [facility], DO sent at 10/5/2022  1:26 PM EDT -----  Regarding: work place injury  Please call patient back and ask if they are intending to pursue this as a workplace injury. If they are, they need to go to [facility] for evaluation per Joseph Sam. Thanks!     KL

## 2022-10-05 NOTE — PROGRESS NOTES
[address] [facility] [address] [facility] [address]  Dept: [phone]  Dept Fax: [phone]  Loc: [phone]  PROGRESS NOTE      Visit Date: 10/5/2022    Rodolfo Box is a 58 y.o. female who presents today for:  Chief Complaint   Patient presents with    Shoulder Pain     Pt presents c/o L shoulder pain that started yesterday. She does not know what caused it. She is also having some numbness and tingling in her hand. She has not tried anything to relieve the sxs. Impression/Plan:  1. Acute pain of left shoulder  Acute, mild  Recommended she take ibuprofen 600 mg 3 times daily with food. Ice the area 20 minutes on, 20 minutes off. Stretches provided  - ibuprofen (ADVIL;MOTRIN) 600 MG tablet; Take 1 tablet by mouth 3 times daily as needed for Pain  Dispense: 21 tablet; Refill: 0    2. Urge incontinence of urine  Chronic, controlled  Medication refilled  - oxybutynin (DITROPAN XL) 10 MG extended release tablet; Take 1 tablet by mouth daily  Dispense: 30 tablet; Refill: 3    3. Muscle spasm of back  Chronic, controlled  Medication refilled  - cyclobenzaprine (FLEXERIL) 5 MG tablet; Take 1 tablet by mouth 3 times daily as needed for Muscle spasms  Dispense: 30 tablet; Refill: 1    4. Shortness of breath  Chronic, controlled  Medication refilled  - fluticasone (FLONASE) 50 MCG/ACT nasal spray; 2 sprays by Each Nostril route daily  Dispense: 16 g; Refill: 0  - albuterol sulfate HFA (VENTOLIN HFA) 108 (90 Base) MCG/ACT inhaler; Inhale 2 puffs into the lungs every 4 hours as needed for Wheezing or Shortness of Breath  Dispense: 8 g; Refill: 5    Return in about 2 weeks (around 10/19/2022). Subjective:  HPI    Here for medication refill and acute shoulder pain. Requesting refills of her medications. She has not been seen in our clinic since 6/23/2022.   She denies any changes or updates in her health since she was last seen here.    Shoulder pain:  started yesterday after lifting boxes at work. Rates it as a 4/10 at its worst.  Present on the left side at bicipital groove of anterior shoulder. She thinks it may be swollen. Pain is worse with reaching across her body. No weakness, loss of range motion, or sensation. No further questions of complaints. Review of Systems   Constitutional:  Negative for chills and fever. HENT:  Negative for congestion and sore throat. Eyes:  Negative for visual disturbance. Respiratory:  Negative for cough and shortness of breath. Cardiovascular:  Negative for chest pain. Gastrointestinal:  Negative for abdominal pain and nausea. Genitourinary:  Negative for dysuria. Musculoskeletal:         Left shoulder pain, acute. See hpi. Skin:  Negative for rash. Neurological:  Negative for dizziness, light-headedness and headaches. Psychiatric/Behavioral:  The patient is not nervous/anxious.       Patient Active Problem List   Diagnosis    HTN (hypertension)    Hyperopia of both eyes with astigmatism and presbyopia    Urge incontinence of urine    BMI 40.0-44.9, adult ([address]    Stress    PMB (postmenopausal bleeding)    Endometrial cancer (HCC)    SBO (small bowel obstruction) (HCC)     Past Medical History:   Diagnosis Date    Anemia     Edema     Endometrial cancer ([address]     cancer free 7/21/21    Infertility female     Obesity     SBO (small bowel obstruction) ([address]       Past Surgical History:   Procedure Laterality Date    DILATION AND CURETTAGE OF UTERUS N/A 12/30/2020    DILATATION AND CURETTAGE HYSTEROSCOPY performed by Diane Ignacio MD at [facility] [address]  02/2021    HYSTERECTOMY, TOTAL ABDOMINAL (CERVIX REMOVED)  03/13/2021    OVARY REMOVAL  2009    1    DE TOTAL ABDOM HYSTERECTOMY  2021     Family History   Problem Relation Age of Onset    Heart Disease Mother     Pancreatic Cancer Father     Stomach Cancer Brother Cancer Brother         Stomach Cancer    Glaucoma Neg Hx     Colon Cancer Neg Hx     Colon Polyps Neg Hx     Esophageal Cancer Neg Hx      Social History     Tobacco Use    Smoking status: Former     Packs/day: 0.00     Years: 5.00     Pack years: 0.00     Types: Cigars, Cigarettes     Quit date: 2011     Years since quittin.4    Smokeless tobacco: Never   Substance Use Topics    Alcohol use: Yes     Alcohol/week: 2.0 standard drinks     Types: 1 Glasses of wine, 1 Cans of beer per week     Comment: occasional      Current Outpatient Medications   Medication Sig Dispense Refill    oxybutynin (DITROPAN XL) 10 MG extended release tablet Take 1 tablet by mouth daily 30 tablet 3    cyclobenzaprine (FLEXERIL) 5 MG tablet Take 1 tablet by mouth 3 times daily as needed for Muscle spasms 30 tablet 1    fluticasone (FLONASE) 50 MCG/ACT nasal spray 2 sprays by Each Nostril route daily 16 g 0    albuterol sulfate HFA (VENTOLIN HFA) 108 (90 Base) MCG/ACT inhaler Inhale 2 puffs into the lungs every 4 hours as needed for Wheezing or Shortness of Breath 8 g 5    ibuprofen (ADVIL;MOTRIN) 600 MG tablet Take 1 tablet by mouth 3 times daily as needed for Pain 21 tablet 0     No current facility-administered medications for this visit.      No Known Allergies    Immunization History   Administered Date(s) Administered    COVID-19, J&J, (age 18y+), IM, 0.5 mL 2021    PPD Test 10/07/2013     Health Maintenance   Topic Date Due    Pneumococcal 0-64 years Vaccine (1 - PCV) Never done    DTaP/Tdap/Td vaccine (1 - Tdap) Never done    Shingles vaccine (1 of 2) Never done    COVID-19 Vaccine (2 - Marcy risk series) 04/10/2021    Flu vaccine (1) Never done    Depression Screen  2023    Breast cancer screen  2023    Cervical cancer screen  2023    Lipids  2027    Colorectal Cancer Screen  2031    Hepatitis C screen  Completed    HIV screen  Completed    Hepatitis A vaccine  Aged Out    Hepatitis B normal.      Breath sounds: Normal breath sounds. Abdominal:      General: Abdomen is flat. There is no distension. Palpations: Abdomen is soft. Musculoskeletal:         General: Normal range of motion. Arms:       Cervical back: Normal range of motion. Skin:     General: Skin is warm and dry. Neurological:      Mental Status: She is alert. Motor: No weakness. Psychiatric:         Mood and Affect: Mood normal.       They voiced understanding. All questions answered. They agreed with treatment plan. See patient instructions for any educational materials that may have been given. Discussed use, benefit, and side effects of prescribed medications. Reviewed health maintenance.     (Please note that portions of this note may have been completed with a voice recognition program.  Efforts were made to edit the dictation but occasionally words are mis-transcribed.)      Electronically signed by Beatrice Mccollum DO on 10/5/2022 at 5:38 PM

## 2022-10-05 NOTE — PROGRESS NOTES
S: 58 y.o. female with   Chief Complaint   Patient presents with    Shoulder Pain     Pt presents c/o L shoulder pain that started yesterday. She does not know what caused it. She is also having some numbness and tingling in her hand. She has not tried anything to relieve the sxs. Reviewed hx and ROS in detail with resident. See notes for additional details. BP Readings from Last 3 Encounters:   10/05/22 132/84   08/28/22 (!) 139/90   06/23/22 120/82     Wt Readings from Last 3 Encounters:   10/05/22 221 lb 6.4 oz (100.4 kg)   06/23/22 230 lb 9.6 oz (104.6 kg)   05/10/22 221 lb 12.8 oz (100.6 kg)           O: VS:  height is 4' 11\" (1.499 m) and weight is 221 lb 6.4 oz (100.4 kg). Her temperature is 98.9 °F (37.2 °C). Her blood pressure is 132/84 and her pulse is 70. Her respiration is 12. Diagnosis Orders   1. Acute pain of left shoulder  ibuprofen (ADVIL;MOTRIN) 600 MG tablet      2. Urge incontinence of urine  oxybutynin (DITROPAN XL) 10 MG extended release tablet      3. Muscle spasm of back  cyclobenzaprine (FLEXERIL) 5 MG tablet      4. Shortness of breath  fluticasone (FLONASE) 50 MCG/ACT nasal spray    albuterol sulfate HFA (VENTOLIN HFA) 108 (90 Base) MCG/ACT inhaler          Plan  Acute shoulder injury-trial muscle relaxer and NSAID follow-up for further evaluation if not resolved      Health Maintenance Due   Topic Date Due    Pneumococcal 0-64 years Vaccine (1 - PCV) Never done    DTaP/Tdap/Td vaccine (1 - Tdap) Never done    Shingles vaccine (1 of 2) Never done    COVID-19 Vaccine (2 - Marcy risk series) 04/10/2021    Flu vaccine (1) Never done         Attending Physician Statement  I have discussed the case, including pertinent history and exam findings with the resident. I agree with the documented assessment and plan as documented by the resident.       Shiela Elizalde MD 10/5/2022 5:11 PM

## 2022-10-05 NOTE — PATIENT INSTRUCTIONS
Please take ibuprofen 600 mg tablet every 8 hours with food    Can ice the area, 20 minutes on and 20 minutes off. Be sure to place a cloth between your skin and the ice.

## 2022-10-05 NOTE — LETTER
eDmi Reno was seen and treated in our clinic on 10/5/2022. She may return to work on 10/6/22. If you have any questions or concerns, please don't hesitate to call.           Electronically signed by Héctor Byrd DO on 10/5/2022 at 4:55 PM

## 2022-11-07 ENCOUNTER — OFFICE VISIT (OUTPATIENT)
Dept: FAMILY MEDICINE CLINIC | Age: 62
End: 2022-11-07
Payer: MEDICARE

## 2022-11-07 VITALS
BODY MASS INDEX: 44.72 KG/M2 | HEIGHT: 59 IN | TEMPERATURE: 97.2 F | HEART RATE: 79 BPM | WEIGHT: 221.8 LBS | DIASTOLIC BLOOD PRESSURE: 76 MMHG | SYSTOLIC BLOOD PRESSURE: 136 MMHG | OXYGEN SATURATION: 97 %

## 2022-11-07 DIAGNOSIS — R05.1 ACUTE COUGH: ICD-10-CM

## 2022-11-07 DIAGNOSIS — J06.9 ACUTE URI: Primary | ICD-10-CM

## 2022-11-07 PROCEDURE — 99213 OFFICE O/P EST LOW 20 MIN: CPT | Performed by: STUDENT IN AN ORGANIZED HEALTH CARE EDUCATION/TRAINING PROGRAM

## 2022-11-07 PROCEDURE — 3074F SYST BP LT 130 MM HG: CPT | Performed by: STUDENT IN AN ORGANIZED HEALTH CARE EDUCATION/TRAINING PROGRAM

## 2022-11-07 PROCEDURE — 3078F DIAST BP <80 MM HG: CPT | Performed by: STUDENT IN AN ORGANIZED HEALTH CARE EDUCATION/TRAINING PROGRAM

## 2022-11-07 PROCEDURE — G8427 DOCREV CUR MEDS BY ELIG CLIN: HCPCS | Performed by: STUDENT IN AN ORGANIZED HEALTH CARE EDUCATION/TRAINING PROGRAM

## 2022-11-07 PROCEDURE — G8484 FLU IMMUNIZE NO ADMIN: HCPCS | Performed by: STUDENT IN AN ORGANIZED HEALTH CARE EDUCATION/TRAINING PROGRAM

## 2022-11-07 PROCEDURE — 3017F COLORECTAL CA SCREEN DOC REV: CPT | Performed by: STUDENT IN AN ORGANIZED HEALTH CARE EDUCATION/TRAINING PROGRAM

## 2022-11-07 PROCEDURE — 1036F TOBACCO NON-USER: CPT | Performed by: STUDENT IN AN ORGANIZED HEALTH CARE EDUCATION/TRAINING PROGRAM

## 2022-11-07 PROCEDURE — G8417 CALC BMI ABV UP PARAM F/U: HCPCS | Performed by: STUDENT IN AN ORGANIZED HEALTH CARE EDUCATION/TRAINING PROGRAM

## 2022-11-07 RX ORDER — BENZONATATE 100 MG/1
100-200 CAPSULE ORAL 3 TIMES DAILY PRN
Qty: 30 CAPSULE | Refills: 1 | Status: SHIPPED | OUTPATIENT
Start: 2022-11-07 | End: 2022-11-17

## 2022-11-07 ASSESSMENT — ENCOUNTER SYMPTOMS
VOMITING: 0
RHINORRHEA: 1
NAUSEA: 0
SORE THROAT: 1
COUGH: 1
DIARRHEA: 0
SHORTNESS OF BREATH: 0

## 2022-11-07 NOTE — LETTER
[address] [address],[address] [id number]  Phone: [phone]  Fax: [phone]    Carlos Carlson MD        November 7, 2022     Patient: Ave Kern   YOB: 1960   Date of Visit: 11/7/2022       To Whom It May Concern:     Tom Quiles was seen in our office today and is able to return to work on 11/8/22  If you have any questions or concerns, please don't hesitate to call.     Sincerely,        Carlos Carlson MD

## 2022-11-07 NOTE — PROGRESS NOTES
Health Maintenance Due   Topic Date Due    Pneumococcal 0-64 years Vaccine (1 - PCV) Never done    DTaP/Tdap/Td vaccine (1 - Tdap) Never done    Shingles vaccine (1 of 2) Never done    COVID-19 Vaccine (2 - Marcy risk series) 04/10/2021    Flu vaccine (1) Never done

## 2022-11-07 NOTE — PROGRESS NOTES
[address] [place] [address] [address]  Dept: [phone]  Dept Fax: [phone]: [phone]      Assessment & Plan   1. Acute URI  Benzocaine lozenges for sore throat. Patient advised to use tylenol and motrin. Also advised to maintain hydration.   - benzonatate (TESSALON) 100 MG capsule; Take 1-2 capsules by mouth 3 times daily as needed for Cough  Dispense: 30 capsule; Refill: 1  - Benzocaine 15 MG LOZG; Take 1 lozenge by mouth every 2 hours as needed (sore throat)  Dispense: 18 lozenge; Refill: 1    2. Acute cough  - benzonatate (TESSALON) 100 MG capsule; Take 1-2 capsules by mouth 3 times daily as needed for Cough  Dispense: 30 capsule; Refill: 1        Return if symptoms worsen or fail to improve. History of Present Illness   Reason for Appointment:   Chief Complaint   Patient presents with    URI     Stuffy/ runny nose, coughing , sore throat     Shanti Boucher is a 58 y.o. female who presents today for:    Symptoms started yesterday. Likely caught it at work, she believes. She has sore throat, rhinorrhea, and a cough. Denies having fevers but does feel pretty fatigued and run down. No other symptoms. Does not want to do testing but is here for symptomatic relief. Review of Systems   Constitutional:  Positive for fatigue. HENT:  Positive for congestion, rhinorrhea and sore throat. Respiratory:  Positive for cough. Negative for shortness of breath. Cardiovascular:  Negative for chest pain. Gastrointestinal:  Negative for diarrhea, nausea and vomiting. No future appointments.     PMH    Patient Active Problem List    Diagnosis Date Noted    SBO (small bowel obstruction) ([facility] 06/17/2021    Endometrial cancer ([facility] 04/16/2021    PMB (postmenopausal bleeding) 12/30/2020    Urge incontinence of urine 10/14/2019    BMI 40.0-44.9, adult ([facility] 10/14/2019    Stress 10/14/2019    Hyperopia of both eyes with astigmatism and presbyopia 2016    HTN (hypertension) 2011       [facility]        Procedure Laterality Date    DILATION AND CURETTAGE OF UTERUS N/A 2020    DILATATION AND CURETTAGE HYSTEROSCOPY performed by Andreas Betancourt MD at [address] [place] [address] BIOPSY  2021    HYSTERECTOMY, TOTAL ABDOMINAL (CERVIX REMOVED)  2021    OVARY REMOVAL  2009    [place] TOTAL ABDOM HYSTERECTOMY         Meds    Prior to Admission medications    Medication Sig Start Date End Date Taking? Authorizing Provider   benzonatate (TESSALON) 100 MG capsule Take 1-2 capsules by mouth 3 times daily as needed for Cough 22 Yes Torsten Alexandre MD   Benzocaine 15 MG LOZG Take 1 lozenge by mouth every 2 hours as needed (sore throat) 22  Yes Torsten Alexandre MD   oxybutynin (DITROPAN XL) 10 MG extended release tablet Take 1 tablet by mouth daily 10/5/22  Yes Lynda Schulz DO   fluticasone (FLONASE) 50 MCG/ACT nasal spray 2 sprays by Each Nostril route daily 10/5/22  Yes Hannah Moser DO   albuterol sulfate HFA (VENTOLIN HFA) 108 (90 Base) MCG/ACT inhaler Inhale 2 puffs into the lungs every 4 hours as needed for Wheezing or Shortness of Breath 10/5/22  Yes Lynda Schulz DO   ibuprofen (ADVIL;MOTRIN) 600 MG tablet Take 1 tablet by mouth 3 times daily as needed for Pain 10/5/22 10/12/22  Lynda Schulz DO        Allergies    Patient has no known allergies. Social    Social History     Tobacco Use    Smoking status: Former     Packs/day: 0.00     Years: 5.00     Pack years: 0.00     Types: Cigars, Cigarettes     Quit date: 2011     Years since quittin.5    Smokeless tobacco: Never   Vaping Use    Vaping Use: Never used   Substance Use Topics    Alcohol use:  Yes     Alcohol/week: 2.0 standard drinks     Types: 1 Glasses of wine, 1 Cans of beer per week     Comment: occasional    Drug use: No       Objective     Vitals:    22 0934   BP: 136/76   Pulse: 79   Temp: 97.2 °F (36.2 °C)   SpO2: 97%       Physical Exam:  GENERAL: Alert and oriented. No distress. EYES: No erythema or icterus. ENT: No thyromegaly or cervical lymphadenopathy. No JVD. Mild swelling and erythema of the nasal turbinates. No tonsillopharyngeal exudates. HEART: Normal S1/S2. No murmur, rub or gallop. LUNGS: Clear to auscultation. ABDOMEN: Soft and non-tender.   EXTREMITIES: no edema  NEUROLOGICAL: Grossly normal.  SKIN: no rashes    Electronically signed by Torsten Alexandre MD on 11/7/2022 at 6:30 PM

## 2022-11-07 NOTE — PROGRESS NOTES
the resident. I agree with the documented assessment and plan as documented by the resident.   [address] [facility],  11/8/2022 7:06 AM

## 2022-11-07 NOTE — PROGRESS NOTES
[address] [facility] [address] [address]  Dept: [phone]  Loc: [facility] Nehemias Maddox (:  1960) is a 58 y.o. female,Established patient, here for evaluation of the following chief complaint(s):  URI (Stuffy/ runny nose, coughing , sore throat)      ASSESSMENT/PLAN:  1. Acute URI  -     benzonatate (TESSALON) 100 MG capsule; Take 1-2 capsules by mouth 3 times daily as needed for Cough, Disp-30 capsule, R-1Normal  -     Benzocaine 15 MG LOZG; Take 1 lozenge by mouth every 2 hours as needed (sore throat), Disp-18 lozenge, R-1Normal  2. Acute cough  -     benzonatate (TESSALON) 100 MG capsule; Take 1-2 capsules by mouth 3 times daily as needed for Cough, Disp-30 capsule, R-1Normal    Tessalon pearls and benzocaine for symptomatic treatment. Patient declined COVID and Flu testing. Symptoms that require further evaluation discussed with patient. Please see resident note for full Assessment/Plan, HPI, ROS and PE    Return if symptoms worsen or fail to improve. SUBJECTIVE/OBJECTIVE:  HPI  Stuffy nose, sore throat, coughing. No exudates on exam per resident. Declined covid and flu testing. Vitals:    22 0934   BP: 136/76   Site: Left Upper Arm   Position: Sitting   Cuff Size: Medium Adult   Pulse: 79   Temp: 97.2 °F (36.2 °C)   TempSrc: Temporal   SpO2: 97%   Weight: 221 lb 12.8 oz (100.6 kg)   Height: 4' 11\" (1.499 m)         An electronic signature was used to authenticate this note.     --Malika Ortiz MD

## 2022-12-14 NOTE — TELEPHONE ENCOUNTER
Received voicemail from [facility] Pre surgical screening requesting most recent OV note be faxed to them at [phone]. Returned their phone call at [phone] spoke with [facility] advised her patient has not been seen since November 2020. Asked that this office note be faxed to them. Patient is having a hysterectomy with a Dr. Jonny Bob OBGYN/Oncology from [place]. Do you want the VV OV Note to be sent or patient physical from November to be sent? [facility]

## 2023-06-05 ENCOUNTER — OFFICE VISIT (OUTPATIENT)
Dept: FAMILY MEDICINE CLINIC | Age: 63
End: 2023-06-05
Payer: MEDICAID

## 2023-06-05 VITALS
DIASTOLIC BLOOD PRESSURE: 88 MMHG | BODY MASS INDEX: 39.55 KG/M2 | HEART RATE: 69 BPM | WEIGHT: 196.2 LBS | RESPIRATION RATE: 16 BRPM | OXYGEN SATURATION: 99 % | TEMPERATURE: 97.2 F | SYSTOLIC BLOOD PRESSURE: 138 MMHG | HEIGHT: 59 IN

## 2023-06-05 DIAGNOSIS — K52.9 ENTERITIS: Primary | ICD-10-CM

## 2023-06-05 DIAGNOSIS — M25.50 CHRONIC JOINT PAIN: ICD-10-CM

## 2023-06-05 DIAGNOSIS — N39.41 URGE INCONTINENCE OF URINE: ICD-10-CM

## 2023-06-05 DIAGNOSIS — J45.20 MILD INTERMITTENT ASTHMA WITHOUT COMPLICATION: ICD-10-CM

## 2023-06-05 DIAGNOSIS — G89.29 CHRONIC JOINT PAIN: ICD-10-CM

## 2023-06-05 PROCEDURE — 3075F SYST BP GE 130 - 139MM HG: CPT | Performed by: STUDENT IN AN ORGANIZED HEALTH CARE EDUCATION/TRAINING PROGRAM

## 2023-06-05 PROCEDURE — 3079F DIAST BP 80-89 MM HG: CPT | Performed by: STUDENT IN AN ORGANIZED HEALTH CARE EDUCATION/TRAINING PROGRAM

## 2023-06-05 PROCEDURE — 99213 OFFICE O/P EST LOW 20 MIN: CPT | Performed by: STUDENT IN AN ORGANIZED HEALTH CARE EDUCATION/TRAINING PROGRAM

## 2023-06-05 RX ORDER — OXYBUTYNIN CHLORIDE 10 MG/1
10 TABLET, EXTENDED RELEASE ORAL DAILY
Qty: 30 TABLET | Refills: 3 | Status: SHIPPED | OUTPATIENT
Start: 2023-06-05

## 2023-06-05 RX ORDER — ALBUTEROL SULFATE 90 UG/1
2 AEROSOL, METERED RESPIRATORY (INHALATION) EVERY 4 HOURS PRN
Qty: 8 G | Refills: 5 | Status: SHIPPED | OUTPATIENT
Start: 2023-06-05

## 2023-06-05 RX ORDER — FLUTICASONE PROPIONATE 50 MCG
2 SPRAY, SUSPENSION (ML) NASAL DAILY
Qty: 16 G | Refills: 0 | Status: SHIPPED | OUTPATIENT
Start: 2023-06-05

## 2023-06-05 RX ORDER — IBUPROFEN 600 MG/1
600 TABLET ORAL 3 TIMES DAILY PRN
Qty: 21 TABLET | Refills: 0 | Status: SHIPPED | OUTPATIENT
Start: 2023-06-05 | End: 2023-06-12

## 2023-06-05 SDOH — ECONOMIC STABILITY: FOOD INSECURITY: WITHIN THE PAST 12 MONTHS, YOU WORRIED THAT YOUR FOOD WOULD RUN OUT BEFORE YOU GOT MONEY TO BUY MORE.: NEVER TRUE

## 2023-06-05 SDOH — ECONOMIC STABILITY: HOUSING INSECURITY
IN THE LAST 12 MONTHS, WAS THERE A TIME WHEN YOU DID NOT HAVE A STEADY PLACE TO SLEEP OR SLEPT IN A SHELTER (INCLUDING NOW)?: NO

## 2023-06-05 SDOH — ECONOMIC STABILITY: FOOD INSECURITY: WITHIN THE PAST 12 MONTHS, THE FOOD YOU BOUGHT JUST DIDN'T LAST AND YOU DIDN'T HAVE MONEY TO GET MORE.: NEVER TRUE

## 2023-06-05 SDOH — ECONOMIC STABILITY: INCOME INSECURITY: HOW HARD IS IT FOR YOU TO PAY FOR THE VERY BASICS LIKE FOOD, HOUSING, MEDICAL CARE, AND HEATING?: NOT VERY HARD

## 2023-06-05 ASSESSMENT — ENCOUNTER SYMPTOMS
DIARRHEA: 1
VOMITING: 0
NAUSEA: 0
COUGH: 0
ABDOMINAL PAIN: 1
ABDOMINAL DISTENTION: 0
SHORTNESS OF BREATH: 0

## 2023-06-05 ASSESSMENT — PATIENT HEALTH QUESTIONNAIRE - PHQ9
SUM OF ALL RESPONSES TO PHQ QUESTIONS 1-9: 0
SUM OF ALL RESPONSES TO PHQ QUESTIONS 1-9: 0
2. FEELING DOWN, DEPRESSED OR HOPELESS: 0
SUM OF ALL RESPONSES TO PHQ QUESTIONS 1-9: 0
SUM OF ALL RESPONSES TO PHQ QUESTIONS 1-9: 0
SUM OF ALL RESPONSES TO PHQ9 QUESTIONS 1 & 2: 0
1. LITTLE INTEREST OR PLEASURE IN DOING THINGS: 0

## 2023-06-05 NOTE — PROGRESS NOTES
S: 58 y.o. female with   Chief Complaint   Patient presents with    Groin Pain     Left side groin pain started this morning also diarrhea 2 times this morning       2 episodes loose stool this morning assc with sudden sharp LLQ lasting a few seconds at time  No blood or mucous reported    Reviewed hx and ROS in detail with resident prior to exam.  See notes for additional details. BP Readings from Last 3 Encounters:   06/05/23 138/88   11/07/22 136/76   10/05/22 132/84     Wt Readings from Last 3 Encounters:   06/05/23 196 lb 3.2 oz (89 kg)   11/07/22 221 lb 12.8 oz (100.6 kg)   10/05/22 221 lb 6.4 oz (100.4 kg)           O: VS:  height is 4' 11\" (1.499 m) and weight is 196 lb 3.2 oz (89 kg). Her temporal temperature is 97.2 °F (36.2 °C). Her blood pressure is 138/88 and her pulse is 69. Her respiration is 16 and oxygen saturation is 99%. Diagnosis Orders   1. Enteritis        2. Acute pain of left shoulder  ibuprofen (ADVIL;MOTRIN) 600 MG tablet      3. Shortness of breath  albuterol sulfate HFA (VENTOLIN HFA) 108 (90 Base) MCG/ACT inhaler    fluticasone (FLONASE) 50 MCG/ACT nasal spray      4. Urge incontinence of urine  oxybutynin (DITROPAN XL) 10 MG extended release tablet          Plan        Health Maintenance Due   Topic Date Due    Pneumococcal 0-64 years Vaccine (1 - PCV) Never done    DTaP/Tdap/Td vaccine (1 - Tdap) Never done    Shingles vaccine (1 of 2) Never done    COVID-19 Vaccine (2 - Marcy risk series) 04/10/2021    Depression Screen  06/23/2023         Attending Physician Statement  I have discussed the case, including pertinent history and exam findings with the resident. I agree with the documented assessment and plan as documented by the resident.   GE modifier added to this encounter    Rosette Liz MD 6/5/2023 11:02 AM
has been trying to lose weight. She does fast during the day and eats mostly at night. Last colonoscopy was 2 years ago with Dr. Funmi Reardon. Past Medical History:   Diagnosis Date    Anemia     Edema     Endometrial cancer ([facility]     cancer free 21    Infertility female     Obesity     SBO (small bowel obstruction) ([facility]        Past Surgical History:   Procedure Laterality Date    DILATION AND CURETTAGE OF UTERUS N/A 2020    DILATATION AND CURETTAGE HYSTEROSCOPY performed by Tara Lynn MD at [address] [place] [address]      ENDOMETRIAL BIOPSY  2021    HYSTERECTOMY, TOTAL ABDOMINAL (CERVIX REMOVED)  2021    OVARY REMOVAL  2009    [place] TOTAL ABDOMINAL HYSTERECT W/WO RMVL TUBE OVARY         No Known Allergies      Current Outpatient Medications:     ibuprofen (ADVIL;MOTRIN) 600 MG tablet, Take 1 tablet by mouth 3 times daily as needed for Pain, Disp: 21 tablet, Rfl: 0    albuterol sulfate HFA (VENTOLIN HFA) 108 (90 Base) MCG/ACT inhaler, Inhale 2 puffs into the lungs every 4 hours as needed for Wheezing or Shortness of Breath, Disp: 8 g, Rfl: 5    fluticasone (FLONASE) 50 MCG/ACT nasal spray, 2 sprays by Each Nostril route daily, Disp: 16 g, Rfl: 0    oxybutynin (DITROPAN XL) 10 MG extended release tablet, Take 1 tablet by mouth daily, Disp: 30 tablet, Rfl: 3    Family History   Problem Relation Age of Onset    Heart Disease Mother     Pancreatic Cancer Father     Stomach Cancer Brother     Cancer Brother         Stomach Cancer    Glaucoma Neg Hx     Colon Cancer Neg Hx     Colon Polyps Neg Hx     Esophageal Cancer Neg Hx        Social History     Tobacco Use    Smoking status: Former     Packs/day: 0.00     Years: 5.00     Pack years: 0.00     Types: Cigars, Cigarettes     Quit date: 2011     Years since quittin.0    Smokeless tobacco: Never   Vaping Use    Vaping Use: Never used   Substance Use Topics    Alcohol use:  Yes     Alcohol/week: 2.0 standard

## 2023-07-03 ENCOUNTER — TELEPHONE (OUTPATIENT)
Dept: FAMILY MEDICINE CLINIC | Age: 63
End: 2023-07-03

## 2023-07-03 NOTE — TELEPHONE ENCOUNTER
Called PT regarding today's appointment needing to be cancelled due to [facility]/our office being out of network. No answer, no VM available, went ahead and cancelled appointment.

## 2023-10-02 ENCOUNTER — APPOINTMENT (OUTPATIENT)
Dept: CT IMAGING | Age: 63
End: 2023-10-02
Payer: MEDICAID

## 2023-10-02 ENCOUNTER — HOSPITAL ENCOUNTER (EMERGENCY)
Age: 63
Discharge: HOME | End: 2023-10-02
Attending: EMERGENCY MEDICINE
Payer: MEDICAID

## 2023-10-02 VITALS
DIASTOLIC BLOOD PRESSURE: 92 MMHG | HEIGHT: 59 IN | OXYGEN SATURATION: 99 % | RESPIRATION RATE: 17 BRPM | WEIGHT: 196 LBS | HEART RATE: 70 BPM | BODY MASS INDEX: 39.51 KG/M2 | SYSTOLIC BLOOD PRESSURE: 140 MMHG | TEMPERATURE: 97.9 F

## 2023-10-02 DIAGNOSIS — R10.13 EPIGASTRIC PAIN: Primary | ICD-10-CM

## 2023-10-02 LAB
ALBUMIN SERPL BCG-MCNC: 3.8 G/DL (ref 3.5–5.1)
ALP SERPL-CCNC: 110 U/L (ref 38–126)
ALT SERPL W/O P-5'-P-CCNC: 12 U/L (ref 11–66)
ANION GAP SERPL CALC-SCNC: 10 MEQ/L (ref 8–16)
AST SERPL-CCNC: 18 U/L (ref 5–40)
BASOPHILS ABSOLUTE: 0 THOU/MM3 (ref 0–0.1)
BASOPHILS NFR BLD AUTO: 0.2 %
BILIRUB CONJ SERPL-MCNC: < 0.2 MG/DL (ref 0–0.3)
BILIRUB SERPL-MCNC: 0.5 MG/DL (ref 0.3–1.2)
BUN SERPL-MCNC: 14 MG/DL (ref 7–22)
CALCIUM SERPL-MCNC: 9.3 MG/DL (ref 8.5–10.5)
CHLORIDE SERPL-SCNC: 106 MEQ/L (ref 98–111)
CO2 SERPL-SCNC: 23 MEQ/L (ref 23–33)
CREAT SERPL-MCNC: 0.5 MG/DL (ref 0.4–1.2)
DEPRECATED RDW RBC AUTO: 48.6 FL (ref 35–45)
EOSINOPHIL NFR BLD AUTO: 0.9 %
EOSINOPHILS ABSOLUTE: 0 THOU/MM3 (ref 0–0.4)
ERYTHROCYTE [DISTWIDTH] IN BLOOD BY AUTOMATED COUNT: 15.9 % (ref 11.5–14.5)
GFR SERPL CREATININE-BSD FRML MDRD: > 60 ML/MIN/1.73M2
GLUCOSE SERPL-MCNC: 83 MG/DL (ref 70–108)
HCT VFR BLD AUTO: 44.9 % (ref 37–47)
HGB BLD-MCNC: 14 GM/DL (ref 12–16)
IMM GRANULOCYTES # BLD AUTO: 0.02 THOU/MM3 (ref 0–0.07)
IMM GRANULOCYTES NFR BLD AUTO: 0.5 %
LIPASE SERPL-CCNC: 14.5 U/L (ref 5.6–51.3)
LYMPHOCYTES ABSOLUTE: 1.3 THOU/MM3 (ref 1–4.8)
LYMPHOCYTES NFR BLD AUTO: 29.6 %
MCH RBC QN AUTO: 26.3 PG (ref 26–33)
MCHC RBC AUTO-ENTMCNC: 31.2 GM/DL (ref 32.2–35.5)
MCV RBC AUTO: 84.4 FL (ref 81–99)
MONOCYTES ABSOLUTE: 0.4 THOU/MM3 (ref 0.4–1.3)
MONOCYTES NFR BLD AUTO: 9.7 %
NEUTROPHILS NFR BLD AUTO: 59.1 %
NRBC BLD AUTO-RTO: 0 /100 WBC
OSMOLALITY SERPL CALC.SUM OF ELEC: 277.2 MOSMOL/KG (ref 275–300)
PLATELET # BLD AUTO: 224 THOU/MM3 (ref 130–400)
PMV BLD AUTO: 10.8 FL (ref 9.4–12.4)
POTASSIUM SERPL-SCNC: 3.5 MEQ/L (ref 3.5–5.2)
PROT SERPL-MCNC: 7.1 G/DL (ref 6.1–8)
RBC # BLD AUTO: 5.32 MILL/MM3 (ref 4.2–5.4)
SEGMENTED NEUTROPHILS ABSOLUTE COUNT: 2.5 THOU/MM3 (ref 1.8–7.7)
SODIUM SERPL-SCNC: 139 MEQ/L (ref 135–145)
TROPONIN, HIGH SENSITIVITY: < 6 NG/L (ref 0–12)
WBC # BLD AUTO: 4.3 THOU/MM3 (ref 4.8–10.8)

## 2023-10-02 PROCEDURE — 74176 CT ABD & PELVIS W/O CONTRAST: CPT

## 2023-10-02 PROCEDURE — 83690 ASSAY OF LIPASE: CPT

## 2023-10-02 PROCEDURE — 96374 THER/PROPH/DIAG INJ IV PUSH: CPT

## 2023-10-02 PROCEDURE — 36415 COLL VENOUS BLD VENIPUNCTURE: CPT

## 2023-10-02 PROCEDURE — 84484 ASSAY OF TROPONIN QUANT: CPT

## 2023-10-02 PROCEDURE — 80053 COMPREHEN METABOLIC PANEL: CPT

## 2023-10-02 PROCEDURE — 85025 COMPLETE CBC W/AUTO DIFF WBC: CPT

## 2023-10-02 PROCEDURE — 6370000000 HC RX 637 (ALT 250 FOR IP): Performed by: EMERGENCY MEDICINE

## 2023-10-02 PROCEDURE — 82248 BILIRUBIN DIRECT: CPT

## 2023-10-02 PROCEDURE — 6360000002 HC RX W HCPCS: Performed by: EMERGENCY MEDICINE

## 2023-10-02 PROCEDURE — 99284 EMERGENCY DEPT VISIT MOD MDM: CPT

## 2023-10-02 RX ORDER — MORPHINE SULFATE 2 MG/ML
2 INJECTION, SOLUTION INTRAMUSCULAR; INTRAVENOUS ONCE
Status: COMPLETED | OUTPATIENT
Start: 2023-10-02 | End: 2023-10-02

## 2023-10-02 RX ORDER — OMEPRAZOLE 20 MG/1
20 CAPSULE, DELAYED RELEASE ORAL
Qty: 30 CAPSULE | Refills: 0 | Status: SHIPPED | OUTPATIENT
Start: 2023-10-02

## 2023-10-02 RX ADMIN — MORPHINE SULFATE 2 MG: 2 INJECTION, SOLUTION INTRAMUSCULAR; INTRAVENOUS at 12:44

## 2023-10-02 RX ADMIN — ALUMINUM HYDROXIDE, MAGNESIUM HYDROXIDE, AND SIMETHICONE: 200; 200; 20 SUSPENSION ORAL at 12:44

## 2023-10-02 ASSESSMENT — PAIN SCALES - GENERAL: PAINLEVEL_OUTOF10: 7

## 2023-10-02 ASSESSMENT — PAIN - FUNCTIONAL ASSESSMENT: PAIN_FUNCTIONAL_ASSESSMENT: 0-10

## 2023-10-02 ASSESSMENT — PAIN DESCRIPTION - DESCRIPTORS: DESCRIPTORS: ACHING

## 2023-10-02 ASSESSMENT — PAIN DESCRIPTION - LOCATION: LOCATION: ABDOMEN

## 2023-10-02 NOTE — ED NOTES
Pt medicated for pain per MAR. Pt traveling to [place] at this time.         Princess Kofi RN  10/02/23 5313

## 2023-10-02 NOTE — ED TRIAGE NOTES
Patient presents to ER with complaints upper abdominal pain that started around 1100 today. Patient denies any other symptoms. Pain reported 7 on a scale of 0-10, described as aching.

## 2023-10-03 ENCOUNTER — TELEPHONE (OUTPATIENT)
Dept: FAMILY MEDICINE CLINIC | Age: 63
End: 2023-10-03

## 2023-11-07 ENCOUNTER — HOSPITAL ENCOUNTER (EMERGENCY)
Age: 63
Discharge: HOME | End: 2023-11-07
Attending: STUDENT IN AN ORGANIZED HEALTH CARE EDUCATION/TRAINING PROGRAM
Payer: MEDICAID

## 2023-11-07 ENCOUNTER — APPOINTMENT (OUTPATIENT)
Dept: CT IMAGING | Age: 63
End: 2023-11-07
Payer: MEDICAID

## 2023-11-07 VITALS
SYSTOLIC BLOOD PRESSURE: 151 MMHG | WEIGHT: 196 LBS | OXYGEN SATURATION: 97 % | BODY MASS INDEX: 39.59 KG/M2 | RESPIRATION RATE: 18 BRPM | TEMPERATURE: 98.7 F | HEART RATE: 56 BPM | DIASTOLIC BLOOD PRESSURE: 87 MMHG

## 2023-11-07 DIAGNOSIS — R10.13 ABDOMINAL PAIN, EPIGASTRIC: Primary | ICD-10-CM

## 2023-11-07 LAB
ALBUMIN SERPL BCG-MCNC: 3.6 G/DL (ref 3.5–5.1)
ALP SERPL-CCNC: 100 U/L (ref 38–126)
ALT SERPL W/O P-5'-P-CCNC: 9 U/L (ref 11–66)
ANION GAP SERPL CALC-SCNC: 9 MEQ/L (ref 8–16)
AST SERPL-CCNC: 16 U/L (ref 5–40)
BACTERIA: ABNORMAL
BASOPHILS ABSOLUTE: 0 THOU/MM3 (ref 0–0.1)
BASOPHILS NFR BLD AUTO: 0.5 %
BILIRUB CONJ SERPL-MCNC: < 0.2 MG/DL (ref 0–0.3)
BILIRUB SERPL-MCNC: 0.3 MG/DL (ref 0.3–1.2)
BILIRUB UR QL STRIP: NEGATIVE
BUN SERPL-MCNC: 12 MG/DL (ref 7–22)
CALCIUM SERPL-MCNC: 8.8 MG/DL (ref 8.5–10.5)
CASTS #/AREA URNS LPF: ABNORMAL /LPF
CHARACTER UR: CLEAR
CHLORIDE SERPL-SCNC: 106 MEQ/L (ref 98–111)
CO2 SERPL-SCNC: 26 MEQ/L (ref 23–33)
COLOR UR: YELLOW
CREAT SERPL-MCNC: 0.6 MG/DL (ref 0.4–1.2)
CRYSTALS URNS QL MICRO: ABNORMAL
DEPRECATED RDW RBC AUTO: 48.5 FL (ref 35–45)
EKG ATRIAL RATE: 54 BPM
EKG P AXIS: 29 DEGREES
EKG P-R INTERVAL: 160 MS
EKG Q-T INTERVAL: 430 MS
EKG QRS DURATION: 80 MS
EKG QTC CALCULATION (BAZETT): 407 MS
EKG R AXIS: -30 DEGREES
EKG T AXIS: -12 DEGREES
EKG VENTRICULAR RATE: 54 BPM
EOSINOPHIL NFR BLD AUTO: 1.3 %
EOSINOPHILS ABSOLUTE: 0.1 THOU/MM3 (ref 0–0.4)
EPITHELIAL CELLS, UA: ABNORMAL /HPF
ERYTHROCYTE [DISTWIDTH] IN BLOOD BY AUTOMATED COUNT: 15.5 % (ref 11.5–14.5)
GFR SERPL CREATININE-BSD FRML MDRD: > 60 ML/MIN/1.73M2
GLUCOSE SERPL-MCNC: 73 MG/DL (ref 70–108)
GLUCOSE UR QL STRIP.AUTO: NEGATIVE MG/DL
HCT VFR BLD AUTO: 42.4 % (ref 37–47)
HGB BLD-MCNC: 13.1 GM/DL (ref 12–16)
HGB UR QL STRIP.AUTO: NEGATIVE
IMM GRANULOCYTES # BLD AUTO: 0.01 THOU/MM3 (ref 0–0.07)
IMM GRANULOCYTES NFR BLD AUTO: 0.3 %
KETONES UR QL STRIP.AUTO: NEGATIVE
LEUKOCYTE ESTERASE UR QL STRIP.AUTO: NEGATIVE
LIPASE SERPL-CCNC: 13.2 U/L (ref 5.6–51.3)
LYMPHOCYTES ABSOLUTE: 1.2 THOU/MM3 (ref 1–4.8)
LYMPHOCYTES NFR BLD AUTO: 30.5 %
MCH RBC QN AUTO: 26.5 PG (ref 26–33)
MCHC RBC AUTO-ENTMCNC: 30.9 GM/DL (ref 32.2–35.5)
MCV RBC AUTO: 85.8 FL (ref 81–99)
MONOCYTES ABSOLUTE: 0.4 THOU/MM3 (ref 0.4–1.3)
MONOCYTES NFR BLD AUTO: 10 %
NEUTROPHILS NFR BLD AUTO: 57.4 %
NITRITE UR QL STRIP.AUTO: NEGATIVE
NRBC BLD AUTO-RTO: 0 /100 WBC
OSMOLALITY SERPL CALC.SUM OF ELEC: 279.6 MOSMOL/KG (ref 275–300)
PH UR STRIP.AUTO: 7.5 [PH] (ref 5–9)
PLATELET # BLD AUTO: 177 THOU/MM3 (ref 130–400)
PMV BLD AUTO: 10.7 FL (ref 9.4–12.4)
POTASSIUM SERPL-SCNC: 3.6 MEQ/L (ref 3.5–5.2)
PROT SERPL-MCNC: 6.7 G/DL (ref 6.1–8)
PROT UR STRIP.AUTO-MCNC: NEGATIVE MG/DL
RBC # BLD AUTO: 4.94 MILL/MM3 (ref 4.2–5.4)
RBC #/AREA URNS HPF: ABNORMAL /HPF
SEGMENTED NEUTROPHILS ABSOLUTE COUNT: 2.3 THOU/MM3 (ref 1.8–7.7)
SODIUM SERPL-SCNC: 141 MEQ/L (ref 135–145)
SPECIFIC GRAVITY UA: > 1.03 (ref 1–1.03)
UROBILINOGEN, URINE: 0.2 EU/DL (ref 0–1)
WBC # BLD AUTO: 4 THOU/MM3 (ref 4.8–10.8)
WBC #/AREA URNS HPF: ABNORMAL /HPF

## 2023-11-07 PROCEDURE — 99285 EMERGENCY DEPT VISIT HI MDM: CPT

## 2023-11-07 PROCEDURE — 82248 BILIRUBIN DIRECT: CPT

## 2023-11-07 PROCEDURE — 2580000003 HC RX 258

## 2023-11-07 PROCEDURE — 81001 URINALYSIS AUTO W/SCOPE: CPT

## 2023-11-07 PROCEDURE — 6360000004 HC RX CONTRAST MEDICATION

## 2023-11-07 PROCEDURE — 83690 ASSAY OF LIPASE: CPT

## 2023-11-07 PROCEDURE — 2500000003 HC RX 250 WO HCPCS

## 2023-11-07 PROCEDURE — 80053 COMPREHEN METABOLIC PANEL: CPT

## 2023-11-07 PROCEDURE — 36415 COLL VENOUS BLD VENIPUNCTURE: CPT

## 2023-11-07 PROCEDURE — 85025 COMPLETE CBC W/AUTO DIFF WBC: CPT

## 2023-11-07 PROCEDURE — 74177 CT ABD & PELVIS W/CONTRAST: CPT

## 2023-11-07 PROCEDURE — 93005 ELECTROCARDIOGRAM TRACING: CPT

## 2023-11-07 PROCEDURE — 96374 THER/PROPH/DIAG INJ IV PUSH: CPT

## 2023-11-07 PROCEDURE — 93010 ELECTROCARDIOGRAM REPORT: CPT | Performed by: INTERNAL MEDICINE

## 2023-11-07 RX ORDER — FAMOTIDINE 20 MG/1
20 TABLET, FILM COATED ORAL ONCE
Qty: 30 TABLET | Refills: 0 | Status: SHIPPED | OUTPATIENT
Start: 2023-11-07 | End: 2023-11-07

## 2023-11-07 RX ORDER — OMEPRAZOLE 40 MG/1
40 CAPSULE, DELAYED RELEASE ORAL
Qty: 30 CAPSULE | Refills: 0 | Status: SHIPPED | OUTPATIENT
Start: 2023-11-07

## 2023-11-07 RX ADMIN — IOPAMIDOL 80 ML: 755 INJECTION, SOLUTION INTRAVENOUS at 14:11

## 2023-11-07 RX ADMIN — SODIUM CHLORIDE, PRESERVATIVE FREE 20 MG: 5 INJECTION INTRAVENOUS at 13:56

## 2023-11-07 ASSESSMENT — PAIN DESCRIPTION - PAIN TYPE: TYPE: ACUTE PAIN

## 2023-11-07 ASSESSMENT — PAIN - FUNCTIONAL ASSESSMENT: PAIN_FUNCTIONAL_ASSESSMENT: NONE - DENIES PAIN

## 2023-11-07 ASSESSMENT — PAIN DESCRIPTION - ONSET: ONSET: ON-GOING

## 2023-11-07 ASSESSMENT — PAIN DESCRIPTION - FREQUENCY: FREQUENCY: INTERMITTENT

## 2023-11-07 NOTE — ED NOTES
Patient to the ED from working at [employer] with abdominal pain. Patient states that while she was at work she had sudden onset of abdominal pain and nausea. She states that her symptoms have since completely resolved. She states that she has history of ulcers in her stomach. Patient is resting in bed with easy and unlabored respirations. Call light in reach. Side rails up x2. Patient denies further complaints or concerns. Will monitor.         Inessa Garcia RN  11/07/23 2006

## 2023-11-07 NOTE — DISCHARGE INSTRUCTIONS
You were seen today with abdominal pain  You are prescribed Pepcid and omeprazole to take please pick it up from your pharmacy. Take your medication as indicated. Do not drink any alcohol. Avoid spicy foods and dairy products. Avoid drinking carbonated beverages (especially any of the dark beverages like coke or pepsi). Return to the Emergency Department for worsening of symptoms, excessive nausea or vomiting, throwing up blood, black stools, any other care or concern.

## 2023-11-07 NOTE — ED PROVIDER NOTES
[facility] DEPT  EMERGENCY DEPARTMENT ENCOUNTER      Pt Name: Barb Padilla  MRN: [id number]  [address] [facility] 1960  Date of evaluation: 11/7/2023  Resident Physician: Phill Byrne DO  Supervising Physician: Waleska Jo COMPLAINT       Chief Complaint   Patient presents with    Abdominal Pain       HISTORY OF PRESENT ILLNESS    Barb Padilla is a 61 y.o. female who presents to the emergency department from home, by private vehicle for evaluation of epigastric pain. Around 930 this morning patient had epigastric pain, nauseous. She had not ate prior to this episode, her pain lasted about a few minutes did not radiate. She has no fevers or chills, no chest pain, no shortness of breath, no urinary symptoms no diarrhea. Patient was seen 10/2 for similar complaint, and her visit CT showed she had cholelithiasis with some inflammation around the gallbladder, she was discharged on omeprazole. Patient reports she has been taking her omeprazole. The patient has no other acute complaints at this time. PAST MEDICAL AND SURGICAL HISTORY     Past Medical History:   Diagnosis Date    Anemia     Edema     Endometrial cancer ([facility])     cancer free 7/21/21    Infertility female     Obesity     SBO (small bowel obstruction) ([facility])      Past Surgical History:   Procedure Laterality Date    DILATION AND CURETTAGE OF UTERUS N/A 12/30/2020    DILATATION AND CURETTAGE HYSTEROSCOPY performed by Robbie Kang MD at [address] [place] [address] BIOPSY  02/2021    HYSTERECTOMY, TOTAL ABDOMINAL (CERVIX REMOVED)  03/13/2021    OVARY REMOVAL  2009    1    [place] TOTAL ABDOMINAL HYSTERECT W/WO RMVL TUBE OVARY  2021       MEDICATIONS   No current facility-administered medications for this encounter.     Current Outpatient Medications:     omeprazole (PRILOSEC) 40 MG delayed release capsule, Take 1 capsule by mouth every morning (before breakfast), Disp:

## 2023-11-08 ENCOUNTER — TELEPHONE (OUTPATIENT)
Dept: FAMILY MEDICINE CLINIC | Age: 63
End: 2023-11-08

## 2023-11-19 LAB
EKG ATRIAL RATE: 54 BPM
EKG P AXIS: 29 DEGREES
EKG P-R INTERVAL: 160 MS
EKG Q-T INTERVAL: 430 MS
EKG QRS DURATION: 80 MS
EKG QTC CALCULATION (BAZETT): 407 MS
EKG R AXIS: -30 DEGREES
EKG T AXIS: -12 DEGREES
EKG VENTRICULAR RATE: 54 BPM

## 2024-01-30 ENCOUNTER — HOSPITAL ENCOUNTER (EMERGENCY)
Age: 64
Discharge: HOME | End: 2024-01-30
Attending: EMERGENCY MEDICINE
Payer: COMMERCIAL

## 2024-01-30 VITALS
TEMPERATURE: 98.6 F | DIASTOLIC BLOOD PRESSURE: 98 MMHG | WEIGHT: 230 LBS | SYSTOLIC BLOOD PRESSURE: 162 MMHG | RESPIRATION RATE: 16 BRPM | BODY MASS INDEX: 46.45 KG/M2 | OXYGEN SATURATION: 98 % | HEART RATE: 69 BPM

## 2024-01-30 DIAGNOSIS — G89.29 ACUTE EXACERBATION OF CHRONIC LOW BACK PAIN: Primary | ICD-10-CM

## 2024-01-30 DIAGNOSIS — M54.50 ACUTE EXACERBATION OF CHRONIC LOW BACK PAIN: Primary | ICD-10-CM

## 2024-01-30 PROCEDURE — 6370000000 HC RX 637 (ALT 250 FOR IP): Performed by: STUDENT IN AN ORGANIZED HEALTH CARE EDUCATION/TRAINING PROGRAM

## 2024-01-30 PROCEDURE — 99283 EMERGENCY DEPT VISIT LOW MDM: CPT

## 2024-01-30 RX ORDER — LIDOCAINE 4 G/G
1 PATCH TOPICAL ONCE
Status: DISCONTINUED | OUTPATIENT
Start: 2024-01-30 | End: 2024-01-30 | Stop reason: HOSPADM

## 2024-01-30 RX ORDER — ACETAMINOPHEN 500 MG
1000 TABLET ORAL ONCE
Status: COMPLETED | OUTPATIENT
Start: 2024-01-30 | End: 2024-01-30

## 2024-01-30 RX ORDER — CYCLOBENZAPRINE HCL 10 MG
10 TABLET ORAL ONCE
Status: COMPLETED | OUTPATIENT
Start: 2024-01-30 | End: 2024-01-30

## 2024-01-30 RX ORDER — CYCLOBENZAPRINE HCL 5 MG
5 TABLET ORAL 2 TIMES DAILY PRN
Qty: 10 TABLET | Refills: 0 | Status: SHIPPED | OUTPATIENT
Start: 2024-01-30 | End: 2024-02-09

## 2024-01-30 RX ADMIN — ACETAMINOPHEN 1000 MG: 500 TABLET ORAL at 13:53

## 2024-01-30 RX ADMIN — CYCLOBENZAPRINE 10 MG: 10 TABLET, FILM COATED ORAL at 13:53

## 2024-01-30 ASSESSMENT — ENCOUNTER SYMPTOMS: BACK PAIN: 1

## 2024-01-30 ASSESSMENT — PAIN SCALES - GENERAL
PAINLEVEL_OUTOF10: 7
PAINLEVEL_OUTOF10: 8

## 2024-01-30 ASSESSMENT — PAIN DESCRIPTION - LOCATION: LOCATION: BACK

## 2024-01-30 ASSESSMENT — PAIN DESCRIPTION - PAIN TYPE: TYPE: ACUTE PAIN

## 2024-01-30 ASSESSMENT — PAIN DESCRIPTION - ORIENTATION: ORIENTATION: LOWER

## 2024-01-30 ASSESSMENT — PAIN DESCRIPTION - FREQUENCY: FREQUENCY: CONTINUOUS

## 2024-01-30 ASSESSMENT — PAIN DESCRIPTION - ONSET: ONSET: ON-GOING

## 2024-01-30 ASSESSMENT — PAIN - FUNCTIONAL ASSESSMENT: PAIN_FUNCTIONAL_ASSESSMENT: 0-10

## 2024-01-30 NOTE — ED PROVIDER NOTES
ATTENDING NOTE:    I supervised and discussed the history, physical exam and the management of this patient with the resident. I reviewed the resident's note and agree with the documented findings and plan of care.  Please see my additional note.    I personally saw and examined the patient.  I have reviewed and agree with the resident's findings, including all diagnostic interpretations and treatment plans as written.  I was present for the key portion of any procedures performed and the inclusive time noted in any critical care statement.    Electronically verified by Stefani Poewll MD  02/01/24 0696    
(DITROPAN XL) 10 MG extended release tablet Take 1 tablet by mouth daily, Disp-30 tablet, R-3Normal       !! - Potential duplicate medications found. Please discuss with provider.          ALLERGIES     has No Known Allergies.    FAMILY HISTORY     She indicated that her mother is . She indicated that her father is . She indicated that her brother is . She indicated that the status of her neg hx is unknown.       SOCIAL HISTORY       Social History     Tobacco Use    Smoking status: Former     Current packs/day: 0.00     Types: Cigars, Cigarettes     Quit date: 2006     Years since quittin.7    Smokeless tobacco: Never   Vaping Use    Vaping Use: Never used   Substance Use Topics    Alcohol use: Yes     Alcohol/week: 2.0 standard drinks of alcohol     Types: 1 Glasses of wine, 1 Cans of beer per week     Comment: occasional    Drug use: No       PHYSICAL EXAM       ED Triage Vitals [24 1133]   BP Temp Temp Source Pulse Respirations SpO2 Height Weight - Scale   (!) 176/99 98.6 °F (37 °C) Oral 60 18 97 % -- 104.3 kg (230 lb)       Physical Exam  Constitutional:       Appearance: Normal appearance. She is not ill-appearing.   HENT:      Head: Normocephalic and atraumatic.      Right Ear: External ear normal.      Left Ear: External ear normal.      Nose: Nose normal.      Mouth/Throat:      Mouth: Mucous membranes are moist.   Eyes:      Extraocular Movements: Extraocular movements intact.   Cardiovascular:      Rate and Rhythm: Normal rate and regular rhythm.      Pulses: Normal pulses.      Heart sounds: Normal heart sounds.   Pulmonary:      Effort: Pulmonary effort is normal.      Breath sounds: Normal breath sounds.   Abdominal:      General: Abdomen is flat.      Palpations: Abdomen is soft.      Tenderness: There is no abdominal tenderness.   Musculoskeletal:         General: Normal range of motion.      Cervical back: Normal range of motion and neck supple.

## 2024-01-30 NOTE — DISCHARGE INSTRUCTIONS
You are seen today in the emergency department for back pain.  You are given medications in the emergency department.    I am sending you home with a prescription for Flexeril.  You took this before and you stated that it helped.    You can buy over-the-counter lidocaine patches to help with the pain as well.  These are called Salonpas or IcyHot.    Please follow-up with family medicine clinic regarding today's visit.  You can also follow-up with orthopedics at [facility] if you continue to have pain.

## 2024-01-30 NOTE — ED NOTES
Pt presents to the ED with concerns of lower back pain for a few months after a fall that has impaired her to perform her work duties today. She reports taking 200mg ibuprofen a few times a day with no relief. Patient currently rates pain at a 7/10.   
  Musculoskeletal:  Positive for back pain. Negative for myalgias.       PAST MEDICAL HISTORY:     Past Medical History:   Diagnosis Date    Anemia     Edema     Endometrial cancer (HCC)     cancer free 21    Infertility female     Obesity     SBO (small bowel obstruction) (HCC)      {Always document- Medical, surgical, allergies, medications, OB, GYN, sexual:[id number]}    SURGICAL HISTORY:     Past Surgical History:   Procedure Laterality Date    DILATION AND CURETTAGE OF UTERUS N/A 2020    DILATATION AND CURETTAGE HYSTEROSCOPY performed by Jessica Melchor MD at [facility]    ECTOPIC PREGNANCY SURGERY      ENDOMETRIAL BIOPSY  2021    HYSTERECTOMY, TOTAL ABDOMINAL (CERVIX REMOVED)  2021    OVARY REMOVAL  2009    UT TOTAL ABDOMINAL HYSTERECT W/WO RMVL TUBE OVARY         MEDICATIONS   Scheduled Meds:  Continuous Infusions:  PRN Meds:.    ALLERGIES:   No Known Allergies    FAMILY HISTORY:     Family History   Problem Relation Age of Onset    Heart Disease Mother     Pancreatic Cancer Father     Stomach Cancer Brother     Cancer Brother         Stomach Cancer    Glaucoma Neg Hx     Colon Cancer Neg Hx     Colon Polyps Neg Hx     Esophageal Cancer Neg Hx        SOCIAL HISTORY:     Social History     Tobacco Use    Smoking status: Former     Current packs/day: 0.00     Types: Cigars, Cigarettes     Quit date: 2006     Years since quittin.7    Smokeless tobacco: Never   Substance Use Topics    Alcohol use: Yes     Alcohol/week: 2.0 standard drinks of alcohol     Types: 1 Glasses of wine, 1 Cans of beer per week     Comment: occasional     {Always document- occupation, education, marital status, drug/alcohol use, family support, home/structured facility/homeless, pharmacy access:[id number]}    PREVIOUS RECORDS REVIEWED:   {Blank multiple:[id number]::\"This is this patient's first visit to [facility] ED, no previous records available on EMR.\",\"***\"}.  {Document what you reviewed and found

## 2024-01-31 ENCOUNTER — TELEPHONE (OUTPATIENT)
Dept: FAMILY MEDICINE CLINIC | Age: 64
End: 2024-01-31

## 2024-02-20 ENCOUNTER — OFFICE VISIT (OUTPATIENT)
Dept: INTERNAL MEDICINE CLINIC | Age: 64
End: 2024-02-20
Payer: COMMERCIAL

## 2024-02-20 VITALS
RESPIRATION RATE: 16 BRPM | SYSTOLIC BLOOD PRESSURE: 148 MMHG | DIASTOLIC BLOOD PRESSURE: 92 MMHG | WEIGHT: 191 LBS | HEIGHT: 59 IN | BODY MASS INDEX: 38.51 KG/M2 | HEART RATE: 96 BPM

## 2024-02-20 DIAGNOSIS — M54.42 ACUTE BILATERAL LOW BACK PAIN WITH BILATERAL SCIATICA: Primary | ICD-10-CM

## 2024-02-20 DIAGNOSIS — M54.41 ACUTE BILATERAL LOW BACK PAIN WITH BILATERAL SCIATICA: Primary | ICD-10-CM

## 2024-02-20 DIAGNOSIS — N39.41 URGE INCONTINENCE OF URINE: ICD-10-CM

## 2024-02-20 DIAGNOSIS — K21.9 GASTROESOPHAGEAL REFLUX DISEASE WITHOUT ESOPHAGITIS: ICD-10-CM

## 2024-02-20 PROCEDURE — 99204 OFFICE O/P NEW MOD 45 MIN: CPT | Performed by: NURSE PRACTITIONER

## 2024-02-20 PROCEDURE — G2211 COMPLEX E/M VISIT ADD ON: HCPCS | Performed by: NURSE PRACTITIONER

## 2024-02-20 PROCEDURE — 3077F SYST BP >= 140 MM HG: CPT | Performed by: NURSE PRACTITIONER

## 2024-02-20 PROCEDURE — 3080F DIAST BP >= 90 MM HG: CPT | Performed by: NURSE PRACTITIONER

## 2024-02-20 RX ORDER — METHYLPREDNISOLONE 4 MG/1
TABLET ORAL
Qty: 1 KIT | Refills: 0 | Status: SHIPPED | OUTPATIENT
Start: 2024-02-20 | End: 2024-02-26

## 2024-02-20 RX ORDER — CYCLOBENZAPRINE HCL 5 MG
5 TABLET ORAL 2 TIMES DAILY PRN
Qty: 30 TABLET | Refills: 0 | Status: SHIPPED | OUTPATIENT
Start: 2024-02-20 | End: 2024-03-21

## 2024-02-20 RX ORDER — OMEPRAZOLE 40 MG/1
40 CAPSULE, DELAYED RELEASE ORAL
Qty: 90 CAPSULE | Refills: 1 | Status: SHIPPED | OUTPATIENT
Start: 2024-02-20 | End: 2024-02-20

## 2024-02-20 RX ORDER — OXYBUTYNIN CHLORIDE 10 MG/1
10 TABLET, EXTENDED RELEASE ORAL DAILY
Qty: 90 TABLET | Refills: 1 | Status: SHIPPED | OUTPATIENT
Start: 2024-02-20

## 2024-02-20 RX ORDER — METHYLPREDNISOLONE 4 MG/1
TABLET ORAL
Qty: 1 KIT | Refills: 0 | Status: SHIPPED | OUTPATIENT
Start: 2024-02-20 | End: 2024-02-20

## 2024-02-20 RX ORDER — CYCLOBENZAPRINE HCL 5 MG
5 TABLET ORAL 2 TIMES DAILY PRN
Qty: 30 TABLET | Refills: 0 | Status: SHIPPED | OUTPATIENT
Start: 2024-02-20 | End: 2024-02-20

## 2024-02-20 RX ORDER — OXYBUTYNIN CHLORIDE 10 MG/1
10 TABLET, EXTENDED RELEASE ORAL DAILY
Qty: 90 TABLET | Refills: 1 | Status: SHIPPED | OUTPATIENT
Start: 2024-02-20 | End: 2024-02-20

## 2024-02-20 RX ORDER — OMEPRAZOLE 40 MG/1
40 CAPSULE, DELAYED RELEASE ORAL
Qty: 90 CAPSULE | Refills: 1 | Status: SHIPPED | OUTPATIENT
Start: 2024-02-20

## 2024-02-20 NOTE — PROGRESS NOTES
[facility] PROFESSIONAL SERVS  [facility]  [address]  [facility] [address]  Dept: [phone]  Dept Fax: [phone]  Loc: [phone]     Visit Date:  2/20/2024    Patient:  Geraldine Joiner  YOB: 1960    HPI:     Chief Complaint   Patient presents with    New Patient    Other     Lower back pain- left thigh pain        Geraldine presents today for medical evaluation of lower back pain and to establish primary care    I have not seen her previously    History of Endometrial cancer with hysterectomy and radiation in 2021. She reports that she has been clear since that time. However, she does report some vaginal bleeding recently. Encouraged to arrange visit with GYN asap.     History of SBO, partial vs early complete obstruction. Able to resolve without surgery.    History of Cholelithiasis. No pain currently. No history of cholecystectomy.     Also history of anemia and OAB reportedly    Complains of lower back pain that started 1/30. Pain initially started as a spasm that came and went. However has been progressively worsening and is present most of the time now. She does have a left sciatica as well. Has been taking ibuprofen daily with little relief. Seen in the ER 1/30 and sent home with cyclobenzaprine. It is somewhat helpful. She was encouraged to follow up at [facility], but has not.      Lumbar Spine 7/2021:    1. Moderate multilevel degenerative facet arthropathy involving the lower 4 lumbar levels. Mild disc space narrowing L4-5. Minimal vertebral body spondylosis scattered in the lumbar spine.  2. No fracture. No appreciable change from prior study.    Medications    Current Outpatient Medications:     cyclobenzaprine (FLEXERIL) 5 MG tablet, Take 1 tablet by mouth 2 times daily as needed for Muscle spasms, Disp: 30 tablet, Rfl: 0    diclofenac sodium (VOLTAREN) 1 % GEL, Apply 4 g topically 4 times daily as needed for

## 2024-02-26 ENCOUNTER — HOSPITAL ENCOUNTER (OUTPATIENT)
Dept: MRI IMAGING | Age: 64
Discharge: HOME | End: 2024-02-26
Payer: COMMERCIAL

## 2024-02-26 DIAGNOSIS — M54.41 ACUTE BILATERAL LOW BACK PAIN WITH BILATERAL SCIATICA: ICD-10-CM

## 2024-02-26 DIAGNOSIS — M54.42 ACUTE BILATERAL LOW BACK PAIN WITH BILATERAL SCIATICA: ICD-10-CM

## 2024-02-26 PROCEDURE — 72148 MRI LUMBAR SPINE W/O DYE: CPT

## 2024-02-28 DIAGNOSIS — R93.89 ABNORMAL MRI: Primary | ICD-10-CM

## 2024-02-28 DIAGNOSIS — R93.7 ABNORMAL MRI, LUMBAR SPINE: ICD-10-CM

## 2024-03-04 DIAGNOSIS — R93.89 ABNORMAL MRI: Primary | ICD-10-CM

## 2024-03-04 ASSESSMENT — ENCOUNTER SYMPTOMS: BACK PAIN: 1

## 2024-03-19 ENCOUNTER — OFFICE VISIT (OUTPATIENT)
Dept: INTERNAL MEDICINE CLINIC | Age: 64
End: 2024-03-19
Payer: COMMERCIAL

## 2024-03-19 VITALS
DIASTOLIC BLOOD PRESSURE: 94 MMHG | SYSTOLIC BLOOD PRESSURE: 138 MMHG | HEART RATE: 86 BPM | HEIGHT: 59 IN | BODY MASS INDEX: 39.51 KG/M2 | WEIGHT: 196 LBS | RESPIRATION RATE: 16 BRPM

## 2024-03-19 DIAGNOSIS — R93.89 ABNORMAL MRI: Primary | ICD-10-CM

## 2024-03-19 DIAGNOSIS — M54.42 ACUTE BILATERAL LOW BACK PAIN WITH BILATERAL SCIATICA: ICD-10-CM

## 2024-03-19 DIAGNOSIS — I10 PRIMARY HYPERTENSION: ICD-10-CM

## 2024-03-19 DIAGNOSIS — M54.41 ACUTE BILATERAL LOW BACK PAIN WITH BILATERAL SCIATICA: ICD-10-CM

## 2024-03-19 PROCEDURE — 3080F DIAST BP >= 90 MM HG: CPT | Performed by: NURSE PRACTITIONER

## 2024-03-19 PROCEDURE — 3075F SYST BP GE 130 - 139MM HG: CPT | Performed by: NURSE PRACTITIONER

## 2024-03-19 PROCEDURE — 99214 OFFICE O/P EST MOD 30 MIN: CPT | Performed by: NURSE PRACTITIONER

## 2024-03-19 RX ORDER — BACLOFEN 10 MG/1
10 TABLET ORAL 3 TIMES DAILY PRN
Qty: 90 TABLET | Refills: 0 | Status: SHIPPED | OUTPATIENT
Start: 2024-03-19 | End: 2024-04-18

## 2024-03-19 RX ORDER — LIDOCAINE 50 MG/G
2 PATCH TOPICAL DAILY
Qty: 20 PATCH | Refills: 0 | Status: SHIPPED | OUTPATIENT
Start: 2024-03-19 | End: 2024-03-29

## 2024-03-19 NOTE — PROGRESS NOTES
[facility] PROFESSIONAL SERVS  [facility]  [address]  [address]  [place] [address]  Dept: [phone]  Dept Fax: [phone]  Loc: [phone]     Visit Date:  3/19/2024    Patient:  Geraldine Joiner  YOB: 1960    HPI:     Chief Complaint   Patient presents with    Follow-up    Back Pain     Geraldine presents today for medical evaluation of lower back pain. She est care with this office on 2/20/2024. Pt was seen by JULES Cook    Lower back pain that started 1/30. Pain initially started as a spasm that came and went. However has been progressively worsening and is present most of the time now. She does have a left sciatica as well. Has been taking ibuprofen daily with little relief. Seen in the ER 1/30 and sent home with cyclobenzaprine. It is somewhat helpful. She was encouraged to follow up at [facility], but has not.       She reports no loss of sensation to lower extremities, no bowel or bladder control, states intermittent numbness and tingling to hips, buttocks, and lower legs    She does not like flexeril, states it makes her too drowsy.   Will try baclofen  Voltaren gel is helping \"somewhat\" she has not tried lidoderm patches.     MRI lumbar spine 2/27/24   IMPRESSION:     1. Degenerative changes in the lumbar spine, worse than on remote CT scan dated 8/22/2020, most marked at L3-4, at which level there is moderate to severe canal and bilateral foraminal stenosis.  2. There is mild to moderate canal and bilateral foraminal stenosis at L2-3.  3. There is mild canal and mild-to-moderate bilateral foramen stenosis at L1-2 and L4-5.  4. There is mild-to-moderate bilateral foraminal stenosis with no canal stenosis at L5-S1     -bulging disc and facet hypertrophy L1-S1     After discussion with pt and family they would like referral neurosurgery.   -will consider PT once cleared by specialty    Assessment is difficult. Pt is very poor

## 2024-03-28 ENCOUNTER — HOSPITAL ENCOUNTER (OUTPATIENT)
Dept: GENERAL RADIOLOGY | Age: 64
Discharge: HOME | End: 2024-03-28
Payer: COMMERCIAL

## 2024-03-28 ENCOUNTER — HOSPITAL ENCOUNTER (OUTPATIENT)
Age: 64
Discharge: HOME | End: 2024-03-28
Payer: COMMERCIAL

## 2024-03-28 ENCOUNTER — OFFICE VISIT (OUTPATIENT)
Dept: INTERNAL MEDICINE CLINIC | Age: 64
End: 2024-03-28
Payer: COMMERCIAL

## 2024-03-28 VITALS
HEIGHT: 59 IN | BODY MASS INDEX: 39.11 KG/M2 | DIASTOLIC BLOOD PRESSURE: 90 MMHG | HEART RATE: 92 BPM | WEIGHT: 194 LBS | SYSTOLIC BLOOD PRESSURE: 140 MMHG

## 2024-03-28 DIAGNOSIS — I10 PRIMARY HYPERTENSION: ICD-10-CM

## 2024-03-28 DIAGNOSIS — M54.40 LOW BACK PAIN WITH SCIATICA, SCIATICA LATERALITY UNSPECIFIED, UNSPECIFIED BACK PAIN LATERALITY, UNSPECIFIED CHRONICITY: ICD-10-CM

## 2024-03-28 DIAGNOSIS — R05.9 COUGH, UNSPECIFIED TYPE: ICD-10-CM

## 2024-03-28 DIAGNOSIS — R06.00 DYSPNEA, UNSPECIFIED TYPE: ICD-10-CM

## 2024-03-28 DIAGNOSIS — J45.20 MILD INTERMITTENT ASTHMA WITHOUT COMPLICATION: Primary | ICD-10-CM

## 2024-03-28 PROBLEM — G89.29 CHRONIC LOW BACK PAIN WITH SCIATICA: Status: ACTIVE | Noted: 2024-03-28

## 2024-03-28 PROCEDURE — 87804 INFLUENZA ASSAY W/OPTIC: CPT | Performed by: NURSE PRACTITIONER

## 2024-03-28 PROCEDURE — 99214 OFFICE O/P EST MOD 30 MIN: CPT | Performed by: NURSE PRACTITIONER

## 2024-03-28 PROCEDURE — 3077F SYST BP >= 140 MM HG: CPT | Performed by: NURSE PRACTITIONER

## 2024-03-28 PROCEDURE — 3080F DIAST BP >= 90 MM HG: CPT | Performed by: NURSE PRACTITIONER

## 2024-03-28 PROCEDURE — 71046 X-RAY EXAM CHEST 2 VIEWS: CPT

## 2024-03-28 RX ORDER — AZITHROMYCIN 250 MG/1
TABLET, FILM COATED ORAL
Qty: 6 TABLET | Refills: 0 | Status: SHIPPED | OUTPATIENT
Start: 2024-03-28 | End: 2024-04-07

## 2024-03-28 RX ORDER — PREDNISONE 50 MG/1
50 TABLET ORAL DAILY
Qty: 5 TABLET | Refills: 0 | Status: SHIPPED | OUTPATIENT
Start: 2024-03-28 | End: 2024-04-02

## 2024-03-28 NOTE — PROGRESS NOTES
[facility] PROFESSIONAL SERVS  [facility]  [address]  [address]  [facility] [address]  Dept: [phone]  Dept Fax: [phone]  Loc: [phone]     Visit Date:  3/28/2024    Patient:  Geraldine Joiner  YOB: 1960    HPI:     Chief Complaint   Patient presents with    Cough    Congestion    Other     Diarrhea      Pt walked in requesting to be seen for an acute visit. Reports cold like sx since Saturday- 6 days. She has been taking dayquil, fernando seltzer, and other OTC medications. She denies fever or headaches. No visual disturbances  + dyspnea and productive cough- voices concern her sputum was red in color once and then yellow  She denies CP.   Hx asthma- she has not used any inhalers for several months but does have 3 at home    HTN in office- may be due to OTC medications. Will continue to monitor. She is not currently on medication for this.     Back pain  -baclofen has helped better than flexeril  She is still using voltaren, did not try the lidoderm  Has not called to follow with Neurosurgery and states she is not interested in PT      Medications    Current Outpatient Medications:     predniSONE (DELTASONE) 50 MG tablet, Take 1 tablet by mouth daily for 5 days, Disp: 5 tablet, Rfl: 0    azithromycin (ZITHROMAX) 250 MG tablet, 500mg on day 1 followed by 250mg on days 2 - 5, Disp: 6 tablet, Rfl: 0    baclofen (LIORESAL) 10 MG tablet, Take 1 tablet by mouth 3 times daily as needed (back pain), Disp: 90 tablet, Rfl: 0    lidocaine (LIDODERM) 5 %, Place 2 patches onto the skin daily for 10 days 12 hours on, 12 hours off., Disp: 20 patch, Rfl: 0    diclofenac sodium (VOLTAREN) 1 % GEL, Apply 4 g topically 4 times daily as needed for Pain, Disp: 100 g, Rfl: 0    omeprazole (PRILOSEC) 40 MG delayed release capsule, Take 1 capsule by mouth every morning (before breakfast), Disp: 90 capsule, Rfl: 1    oxyBUTYnin (DITROPAN XL) 10 MG

## 2024-06-06 ENCOUNTER — HOSPITAL ENCOUNTER (EMERGENCY)
Age: 64
Discharge: HOME | End: 2024-06-06
Payer: COMMERCIAL

## 2024-06-06 VITALS
DIASTOLIC BLOOD PRESSURE: 63 MMHG | BODY MASS INDEX: 39.11 KG/M2 | OXYGEN SATURATION: 95 % | SYSTOLIC BLOOD PRESSURE: 120 MMHG | HEART RATE: 90 BPM | RESPIRATION RATE: 26 BRPM | WEIGHT: 194 LBS | HEIGHT: 59 IN | TEMPERATURE: 101 F

## 2024-06-06 DIAGNOSIS — J45.21 MILD INTERMITTENT ASTHMA WITH EXACERBATION: Primary | ICD-10-CM

## 2024-06-06 LAB
FLUAV RNA RESP QL NAA+PROBE: NOT DETECTED
FLUBV RNA RESP QL NAA+PROBE: NOT DETECTED
SARS-COV-2 RNA RESP QL NAA+PROBE: NOT DETECTED

## 2024-06-06 PROCEDURE — 6370000000 HC RX 637 (ALT 250 FOR IP): Performed by: NURSE PRACTITIONER

## 2024-06-06 PROCEDURE — 99283 EMERGENCY DEPT VISIT LOW MDM: CPT

## 2024-06-06 PROCEDURE — 87636 SARSCOV2 & INF A&B AMP PRB: CPT

## 2024-06-06 PROCEDURE — 94640 AIRWAY INHALATION TREATMENT: CPT

## 2024-06-06 RX ORDER — PREDNISONE 10 MG/1
TABLET ORAL
Qty: 20 TABLET | Refills: 0 | Status: SHIPPED | OUTPATIENT
Start: 2024-06-06 | End: 2024-06-16

## 2024-06-06 RX ORDER — IPRATROPIUM BROMIDE AND ALBUTEROL SULFATE 2.5; .5 MG/3ML; MG/3ML
1 SOLUTION RESPIRATORY (INHALATION) ONCE
Status: COMPLETED | OUTPATIENT
Start: 2024-06-06 | End: 2024-06-06

## 2024-06-06 RX ORDER — OXYMETAZOLINE HYDROCHLORIDE 0.05 G/100ML
2 SPRAY NASAL ONCE
Status: COMPLETED | OUTPATIENT
Start: 2024-06-06 | End: 2024-06-06

## 2024-06-06 RX ORDER — BENZONATATE 100 MG/1
100 CAPSULE ORAL 3 TIMES DAILY PRN
Qty: 30 CAPSULE | Refills: 0 | Status: SHIPPED | OUTPATIENT
Start: 2024-06-06 | End: 2024-06-13

## 2024-06-06 RX ORDER — AZITHROMYCIN 250 MG/1
TABLET, FILM COATED ORAL
Qty: 1 PACKET | Refills: 0 | Status: SHIPPED | OUTPATIENT
Start: 2024-06-06 | End: 2024-06-10

## 2024-06-06 RX ORDER — OXYMETAZOLINE HYDROCHLORIDE 0.05 G/100ML
2 SPRAY NASAL 2 TIMES DAILY
Qty: 2 ML | Refills: 0
Start: 2024-06-06 | End: 2024-06-09

## 2024-06-06 RX ORDER — ACETAMINOPHEN 500 MG
1000 TABLET ORAL ONCE
Status: COMPLETED | OUTPATIENT
Start: 2024-06-06 | End: 2024-06-06

## 2024-06-06 RX ORDER — CETIRIZINE HYDROCHLORIDE 10 MG/1
10 TABLET ORAL ONCE
Status: COMPLETED | OUTPATIENT
Start: 2024-06-06 | End: 2024-06-06

## 2024-06-06 RX ADMIN — ACETAMINOPHEN 1000 MG: 500 TABLET ORAL at 19:11

## 2024-06-06 RX ADMIN — OXYMETAZOLINE HCL 2 SPRAY: 0.05 SPRAY NASAL at 20:20

## 2024-06-06 RX ADMIN — CETIRIZINE HYDROCHLORIDE 10 MG: 10 TABLET, FILM COATED ORAL at 19:11

## 2024-06-06 RX ADMIN — IPRATROPIUM BROMIDE AND ALBUTEROL SULFATE 1 DOSE: .5; 3 SOLUTION RESPIRATORY (INHALATION) at 19:36

## 2024-06-06 ASSESSMENT — ENCOUNTER SYMPTOMS
ABDOMINAL PAIN: 0
SINUS PRESSURE: 0
SINUS PAIN: 0
COUGH: 1
EYE PAIN: 0
CONSTIPATION: 0
SHORTNESS OF BREATH: 0
EYE ITCHING: 0
DIARRHEA: 0
RHINORRHEA: 1
CHEST TIGHTNESS: 0
NAUSEA: 0
SORE THROAT: 0

## 2024-06-06 NOTE — ED PROVIDER NOTES
Normal heart sounds.   Pulmonary:      Effort: Pulmonary effort is normal.      Breath sounds: Wheezing present.   Abdominal:      General: Abdomen is flat.      Palpations: Abdomen is soft.      Tenderness: There is no abdominal tenderness.   Musculoskeletal:         General: Normal range of motion.      Cervical back: Normal range of motion.   Skin:     General: Skin is warm and dry.      Capillary Refill: Capillary refill takes less than 2 seconds.   Neurological:      General: No focal deficit present.      Mental Status: She is alert and oriented to person, place, and time. Mental status is at baseline.   Psychiatric:         Mood and Affect: Mood normal.         Behavior: Behavior normal.         Thought Content: Thought content normal.         Judgment: Judgment normal.         DIFFERENTIAL DIAGNOSIS:   Viral URI, asthma exacerbation, allergic rhinitis, bronchitis    DIAGNOSTIC RESULTS         RADIOLOGY: non-plainfilm images(s) such as CT, Ultrasound and MRI are read by the radiologist.  Plain radiographic images are visualized and preliminarily interpreted by the emergency physician unless otherwise stated below.  No orders to display       LABS:   Labs Reviewed   COVID-19 & INFLUENZA COMBO       EMERGENCY DEPARTMENT COURSE:   Vitals:    Vitals:    06/06/24 1835 06/06/24 2022   BP: (!) 157/87 120/63   Pulse: 79 90   Resp: 13 26   Temp: (!) 101.6 °F (38.7 °C) (!) 101 °F (38.3 °C)   TempSrc: Oral    SpO2: 94% 95%   Weight: 88 kg (194 lb)    Height: 1.499 m (4' 11\")        MDM    Patient was seen and evaluated in the emergency department, patient appeared to be in no acute distress, vital signs reviewed, fever and hypertension noted.  Physical exam completed, mild ill appearance noted.  Congestion and rhinorrhea noted, wheezing noted.  Labs are obtained, COVID-19 and influenza negative.  Patient treated medications below, noted some mild improvement in symptoms.  Will treat patient for asthma/COPD exacerbation.

## 2024-06-06 NOTE — ED TRIAGE NOTES
Pt presents to the ED through lobby with c/o cough. States she has not been feeling well for a few days. States she has \"a little phlegm\" when she coughs.. currently denies any pain but states she had a headache earlier today. Denies SOB or chest pain

## 2024-06-19 SDOH — ECONOMIC STABILITY: TRANSPORTATION INSECURITY
IN THE PAST 12 MONTHS, HAS LACK OF TRANSPORTATION KEPT YOU FROM MEETINGS, WORK, OR FROM GETTING THINGS NEEDED FOR DAILY LIVING?: YES

## 2024-06-19 SDOH — ECONOMIC STABILITY: INCOME INSECURITY: HOW HARD IS IT FOR YOU TO PAY FOR THE VERY BASICS LIKE FOOD, HOUSING, MEDICAL CARE, AND HEATING?: SOMEWHAT HARD

## 2024-06-19 SDOH — ECONOMIC STABILITY: FOOD INSECURITY: WITHIN THE PAST 12 MONTHS, YOU WORRIED THAT YOUR FOOD WOULD RUN OUT BEFORE YOU GOT MONEY TO BUY MORE.: PATIENT DECLINED

## 2024-06-19 SDOH — ECONOMIC STABILITY: FOOD INSECURITY: WITHIN THE PAST 12 MONTHS, THE FOOD YOU BOUGHT JUST DIDN'T LAST AND YOU DIDN'T HAVE MONEY TO GET MORE.: PATIENT DECLINED

## 2024-06-20 ENCOUNTER — NURSE ONLY (OUTPATIENT)
Dept: INTERNAL MEDICINE CLINIC | Age: 64
End: 2024-06-20

## 2024-06-20 ENCOUNTER — OFFICE VISIT (OUTPATIENT)
Dept: INTERNAL MEDICINE CLINIC | Age: 64
End: 2024-06-20
Payer: COMMERCIAL

## 2024-06-20 VITALS
RESPIRATION RATE: 16 BRPM | HEART RATE: 71 BPM | DIASTOLIC BLOOD PRESSURE: 81 MMHG | SYSTOLIC BLOOD PRESSURE: 143 MMHG | BODY MASS INDEX: 38.51 KG/M2 | HEIGHT: 59 IN | WEIGHT: 191 LBS

## 2024-06-20 DIAGNOSIS — I10 PRIMARY HYPERTENSION: ICD-10-CM

## 2024-06-20 DIAGNOSIS — Z13.220 SCREENING FOR CHOLESTEROL LEVEL: ICD-10-CM

## 2024-06-20 DIAGNOSIS — M54.41 ACUTE BILATERAL LOW BACK PAIN WITH BILATERAL SCIATICA: ICD-10-CM

## 2024-06-20 DIAGNOSIS — I10 PRIMARY HYPERTENSION: Primary | ICD-10-CM

## 2024-06-20 DIAGNOSIS — M54.42 ACUTE BILATERAL LOW BACK PAIN WITH BILATERAL SCIATICA: ICD-10-CM

## 2024-06-20 LAB
ALBUMIN SERPL BCG-MCNC: 3.7 G/DL (ref 3.5–5.1)
ALP SERPL-CCNC: 106 U/L (ref 38–126)
ALT SERPL W/O P-5'-P-CCNC: 13 U/L (ref 11–66)
ANION GAP SERPL CALC-SCNC: 11 MEQ/L (ref 8–16)
AST SERPL-CCNC: 16 U/L (ref 5–40)
BASOPHILS ABSOLUTE: 0 THOU/MM3 (ref 0–0.1)
BASOPHILS NFR BLD AUTO: 0.6 %
BILIRUB SERPL-MCNC: 0.5 MG/DL (ref 0.3–1.2)
BUN SERPL-MCNC: 12 MG/DL (ref 7–22)
CALCIUM SERPL-MCNC: 9.2 MG/DL (ref 8.5–10.5)
CHLORIDE SERPL-SCNC: 105 MEQ/L (ref 98–111)
CHOLEST SERPL-MCNC: 197 MG/DL (ref 100–199)
CO2 SERPL-SCNC: 24 MEQ/L (ref 23–33)
CREAT SERPL-MCNC: 0.6 MG/DL (ref 0.4–1.2)
DEPRECATED RDW RBC AUTO: 46.3 FL (ref 35–45)
EOSINOPHIL NFR BLD AUTO: 1.6 %
EOSINOPHILS ABSOLUTE: 0.1 THOU/MM3 (ref 0–0.4)
ERYTHROCYTE [DISTWIDTH] IN BLOOD BY AUTOMATED COUNT: 15.3 % (ref 11.5–14.5)
GFR SERPL CREATININE-BSD FRML MDRD: > 90 ML/MIN/1.73M2
GLUCOSE SERPL-MCNC: 86 MG/DL (ref 70–108)
HCT VFR BLD AUTO: 44.9 % (ref 37–47)
HGB BLD-MCNC: 13.8 GM/DL (ref 12–16)
IMM GRANULOCYTES # BLD AUTO: 0.01 THOU/MM3 (ref 0–0.07)
IMM GRANULOCYTES NFR BLD AUTO: 0.2 %
LYMPHOCYTES ABSOLUTE: 1.2 THOU/MM3 (ref 1–4.8)
LYMPHOCYTES NFR BLD AUTO: 24.7 %
MCH RBC QN AUTO: 25.9 PG (ref 26–33)
MCHC RBC AUTO-ENTMCNC: 30.7 GM/DL (ref 32.2–35.5)
MCV RBC AUTO: 84.4 FL (ref 81–99)
MONOCYTES ABSOLUTE: 0.4 THOU/MM3 (ref 0.4–1.3)
MONOCYTES NFR BLD AUTO: 8 %
NEUTROPHILS ABSOLUTE: 3.2 THOU/MM3 (ref 1.8–7.7)
NEUTROPHILS NFR BLD AUTO: 64.9 %
NRBC BLD AUTO-RTO: 0 /100 WBC
PLATELET # BLD AUTO: 243 THOU/MM3 (ref 130–400)
PMV BLD AUTO: 10.3 FL (ref 9.4–12.4)
POTASSIUM SERPL-SCNC: 3.7 MEQ/L (ref 3.5–5.2)
PROT SERPL-MCNC: 7.1 G/DL (ref 6.1–8)
RBC # BLD AUTO: 5.32 MILL/MM3 (ref 4.2–5.4)
SODIUM SERPL-SCNC: 140 MEQ/L (ref 135–145)
TSH SERPL DL<=0.005 MIU/L-ACNC: 0.8 UIU/ML (ref 0.4–4.2)
WBC # BLD AUTO: 4.9 THOU/MM3 (ref 4.8–10.8)

## 2024-06-20 PROCEDURE — 3077F SYST BP >= 140 MM HG: CPT | Performed by: NURSE PRACTITIONER

## 2024-06-20 PROCEDURE — 99214 OFFICE O/P EST MOD 30 MIN: CPT | Performed by: NURSE PRACTITIONER

## 2024-06-20 PROCEDURE — 3079F DIAST BP 80-89 MM HG: CPT | Performed by: NURSE PRACTITIONER

## 2024-06-20 RX ORDER — AMLODIPINE BESYLATE 10 MG/1
10 TABLET ORAL DAILY
Qty: 90 TABLET | Refills: 0 | Status: SHIPPED | OUTPATIENT
Start: 2024-06-20

## 2024-06-20 ASSESSMENT — ENCOUNTER SYMPTOMS
CHEST TIGHTNESS: 0
SHORTNESS OF BREATH: 0
NAUSEA: 0
BACK PAIN: 1
DIARRHEA: 0
COUGH: 0
CONSTIPATION: 0
ABDOMINAL PAIN: 0
VOMITING: 0
WHEEZING: 0

## 2024-06-20 NOTE — PROGRESS NOTES
History  Geraldine  reports that she quit smoking about 18 years ago. Her smoking use included cigars and cigarettes. She has never used smokeless tobacco. She reports current alcohol use of about 2.0 standard drinks of alcohol per week. She reports that she does not use drugs.    Health Maintenance:    Health Maintenance   Topic Date Due    Breast cancer screen  07/20/2023    Pneumococcal 0-64 years Vaccine (1 of 2 - PCV) 02/20/2025 (Originally 8/16/1966)    DTaP/Tdap/Td vaccine (1 - Tdap) 06/20/2025 (Originally 8/16/1979)    Shingles vaccine (1 of 2) 06/20/2025 (Originally [birth year]/16/1979)    COVID-19 Vaccine (2 - Marcy risk series) 06/20/2025 (Originally 4/10/2021)    Respiratory Syncytial Virus (RSV) Pregnant or age 60 yrs+ (1 - 1-dose 60+ series) 06/20/2025 (Originally 8/16/2020)    Flu vaccine (Season Ended) 08/01/2024    Depression Screen  02/20/2025    Lipids  06/23/2027    Colorectal Cancer Screen  08/26/2031    Hepatitis C screen  Completed    HIV screen  Completed    Hepatitis A vaccine  Aged Out    Hepatitis B vaccine  Aged Out    Hib vaccine  Aged Out    Polio vaccine  Aged Out    Meningococcal (ACWY) vaccine  Aged Out    Cervical cancer screen  Discontinued       Subjective:      Review of Systems   Constitutional: Negative.    Respiratory:  Negative for cough, chest tightness, shortness of breath and wheezing.    Cardiovascular:  Negative for chest pain and palpitations.   Gastrointestinal:  Negative for abdominal pain, constipation, diarrhea, nausea and vomiting.   Musculoskeletal:  Positive for arthralgias and back pain. Negative for gait problem.        General- no recent injury or worsening of pain. Sx relieved when using voltaren   Neurological:  Negative for dizziness, tremors, syncope, speech difficulty, weakness and headaches.   Hematological: Negative.    Psychiatric/Behavioral: Negative.         Objective:     BP (!) 143/81 (Site: Right Upper Arm, Position: Sitting, Cuff Size: Medium Adult)   Pulse

## 2024-08-15 ENCOUNTER — OFFICE VISIT (OUTPATIENT)
Dept: INTERNAL MEDICINE CLINIC | Age: 64
End: 2024-08-15
Payer: COMMERCIAL

## 2024-08-15 VITALS
BODY MASS INDEX: 38.96 KG/M2 | DIASTOLIC BLOOD PRESSURE: 74 MMHG | WEIGHT: 193 LBS | SYSTOLIC BLOOD PRESSURE: 126 MMHG | HEART RATE: 91 BPM | RESPIRATION RATE: 16 BRPM

## 2024-08-15 DIAGNOSIS — M54.9 CHRONIC BACK PAIN, UNSPECIFIED BACK LOCATION, UNSPECIFIED BACK PAIN LATERALITY: ICD-10-CM

## 2024-08-15 DIAGNOSIS — G89.29 CHRONIC BACK PAIN, UNSPECIFIED BACK LOCATION, UNSPECIFIED BACK PAIN LATERALITY: ICD-10-CM

## 2024-08-15 DIAGNOSIS — R93.89 ABNORMAL MRI: Primary | ICD-10-CM

## 2024-08-15 DIAGNOSIS — N39.41 URGE INCONTINENCE OF URINE: ICD-10-CM

## 2024-08-15 PROCEDURE — 3078F DIAST BP <80 MM HG: CPT | Performed by: NURSE PRACTITIONER

## 2024-08-15 PROCEDURE — 3074F SYST BP LT 130 MM HG: CPT | Performed by: NURSE PRACTITIONER

## 2024-08-15 PROCEDURE — 99213 OFFICE O/P EST LOW 20 MIN: CPT | Performed by: NURSE PRACTITIONER

## 2024-08-15 RX ORDER — BACLOFEN 10 MG/1
10 TABLET ORAL 3 TIMES DAILY
Qty: 21 TABLET | Refills: 0 | Status: SHIPPED | OUTPATIENT
Start: 2024-08-15 | End: 2024-08-22

## 2024-08-15 RX ORDER — OXYBUTYNIN CHLORIDE 10 MG/1
10 TABLET, EXTENDED RELEASE ORAL DAILY
Qty: 90 TABLET | Refills: 1 | Status: SHIPPED | OUTPATIENT
Start: 2024-08-15

## 2024-08-15 ASSESSMENT — ENCOUNTER SYMPTOMS
GASTROINTESTINAL NEGATIVE: 1
RESPIRATORY NEGATIVE: 1
BACK PAIN: 1

## 2024-08-15 NOTE — PROGRESS NOTES
08/26/2031    Hepatitis C screen  Completed    HIV screen  Completed    Hepatitis A vaccine  Aged Out    Hepatitis B vaccine  Aged Out    Hib vaccine  Aged Out    Polio vaccine  Aged Out    Meningococcal (ACWY) vaccine  Aged Out    Cervical cancer screen  Discontinued       Subjective:      Review of Systems   Constitutional: Negative.    Respiratory: Negative.     Cardiovascular: Negative.    Gastrointestinal: Negative.    Genitourinary:  Negative for difficulty urinating, flank pain and hematuria.        No loss of bladder control   Musculoskeletal:  Positive for back pain. Negative for gait problem. Myalgias: knees and feet occasionally sore in the mornings.  Skin: Negative.    Neurological: Negative.    Hematological: Negative.        Objective:     /74 (Site: Left Upper Arm, Position: Sitting, Cuff Size: Medium Adult)   Pulse 91   Resp 16   Wt 87.5 kg (193 lb)   LMP 10/14/2019   BMI 38.96 kg/m²     Physical Exam  Constitutional:       Appearance: Normal appearance.   Cardiovascular:      Rate and Rhythm: Normal rate and regular rhythm.      Pulses: Normal pulses.      Heart sounds: Normal heart sounds.   Pulmonary:      Effort: Pulmonary effort is normal.      Breath sounds: Normal breath sounds.   Musculoskeletal:         General: No swelling or deformity. Normal range of motion.      Right lower leg: No edema.      Left lower leg: No edema.   Skin:     General: Skin is warm and dry.   Neurological:      General: No focal deficit present.      Mental Status: She is alert and oriented to person, place, and time.   Psychiatric:         Mood and Affect: Mood normal.         Behavior: Behavior normal.         Labs Reviewed 8/15/2024:    Lab Results   Component Value Date    WBC 4.9 06/20/2024    HGB 13.8 06/20/2024    HCT 44.9 06/20/2024     06/20/2024    CHOL 197 06/20/2024    TRIG 74 06/23/2022    HDL 81 06/23/2022    ALT 13 06/20/2024    AST 16 06/20/2024     06/20/2024    K 3.7

## (undated) DEVICE — SOLUTION IV 1000ML 0.9% SOD CHL PH 5 INJ USP VIAFLX PLAS

## (undated) DEVICE — GOWN,SIRUS,NON REINFRCD,LARGE,SET IN SL: Brand: MEDLINE

## (undated) DEVICE — GLOVE ORANGE PI 7   MSG9070

## (undated) DEVICE — GOWN,SIRUS,NONRNF,SETINSLV,XL,20/CS: Brand: MEDLINE